# Patient Record
Sex: MALE | Race: WHITE | Employment: OTHER | URBAN - METROPOLITAN AREA
[De-identification: names, ages, dates, MRNs, and addresses within clinical notes are randomized per-mention and may not be internally consistent; named-entity substitution may affect disease eponyms.]

---

## 2017-03-02 ENCOUNTER — OFFICE VISIT (OUTPATIENT)
Dept: FAMILY MEDICINE CLINIC | Age: 82
End: 2017-03-02

## 2017-03-02 VITALS
WEIGHT: 181 LBS | SYSTOLIC BLOOD PRESSURE: 125 MMHG | DIASTOLIC BLOOD PRESSURE: 65 MMHG | HEART RATE: 60 BPM | BODY MASS INDEX: 24.52 KG/M2 | HEIGHT: 72 IN

## 2017-03-02 DIAGNOSIS — N13.8 BPH WITH OBSTRUCTION/LOWER URINARY TRACT SYMPTOMS: ICD-10-CM

## 2017-03-02 DIAGNOSIS — H35.30 MACULAR DEGENERATION: ICD-10-CM

## 2017-03-02 DIAGNOSIS — E78.00 HYPERCHOLESTEREMIA: ICD-10-CM

## 2017-03-02 DIAGNOSIS — N40.1 BPH WITH OBSTRUCTION/LOWER URINARY TRACT SYMPTOMS: ICD-10-CM

## 2017-03-02 DIAGNOSIS — R01.1 CARDIAC MURMUR, PREVIOUSLY UNDIAGNOSED: ICD-10-CM

## 2017-03-02 DIAGNOSIS — J43.9 PULMONARY EMPHYSEMA, UNSPECIFIED EMPHYSEMA TYPE (HCC): Primary | ICD-10-CM

## 2017-03-02 LAB
A/G RATIO: 1.6 (ref 1.1–2.2)
ALBUMIN SERPL-MCNC: 4.2 G/DL (ref 3.4–5)
ALP BLD-CCNC: 78 U/L (ref 40–129)
ALT SERPL-CCNC: 13 U/L (ref 10–40)
ANION GAP SERPL CALCULATED.3IONS-SCNC: 14 MMOL/L (ref 3–16)
AST SERPL-CCNC: 22 U/L (ref 15–37)
BASOPHILS ABSOLUTE: 0.1 K/UL (ref 0–0.2)
BASOPHILS RELATIVE PERCENT: 0.7 %
BILIRUB SERPL-MCNC: 0.6 MG/DL (ref 0–1)
BUN BLDV-MCNC: 23 MG/DL (ref 7–20)
CALCIUM SERPL-MCNC: 9.6 MG/DL (ref 8.3–10.6)
CHLORIDE BLD-SCNC: 104 MMOL/L (ref 99–110)
CHOLESTEROL, TOTAL: 205 MG/DL (ref 0–199)
CO2: 26 MMOL/L (ref 21–32)
CREAT SERPL-MCNC: 1 MG/DL (ref 0.8–1.3)
EOSINOPHILS ABSOLUTE: 0.1 K/UL (ref 0–0.6)
EOSINOPHILS RELATIVE PERCENT: 1.7 %
GFR AFRICAN AMERICAN: >60
GFR NON-AFRICAN AMERICAN: >60
GLOBULIN: 2.7 G/DL
GLUCOSE BLD-MCNC: 97 MG/DL (ref 70–99)
HCT VFR BLD CALC: 44.1 % (ref 40.5–52.5)
HDLC SERPL-MCNC: 47 MG/DL (ref 40–60)
HEMOGLOBIN: 14.3 G/DL (ref 13.5–17.5)
LDL CHOLESTEROL CALCULATED: 131 MG/DL
LYMPHOCYTES ABSOLUTE: 1.1 K/UL (ref 1–5.1)
LYMPHOCYTES RELATIVE PERCENT: 13.2 %
MCH RBC QN AUTO: 31.8 PG (ref 26–34)
MCHC RBC AUTO-ENTMCNC: 32.4 G/DL (ref 31–36)
MCV RBC AUTO: 98.4 FL (ref 80–100)
MONOCYTES ABSOLUTE: 0.9 K/UL (ref 0–1.3)
MONOCYTES RELATIVE PERCENT: 10.7 %
NEUTROPHILS ABSOLUTE: 5.9 K/UL (ref 1.7–7.7)
NEUTROPHILS RELATIVE PERCENT: 73.7 %
PDW BLD-RTO: 13.8 % (ref 12.4–15.4)
PLATELET # BLD: 186 K/UL (ref 135–450)
PMV BLD AUTO: 10.1 FL (ref 5–10.5)
POTASSIUM SERPL-SCNC: 4.8 MMOL/L (ref 3.5–5.1)
RBC # BLD: 4.49 M/UL (ref 4.2–5.9)
SODIUM BLD-SCNC: 144 MMOL/L (ref 136–145)
TOTAL PROTEIN: 6.9 G/DL (ref 6.4–8.2)
TRIGL SERPL-MCNC: 133 MG/DL (ref 0–150)
VLDLC SERPL CALC-MCNC: 27 MG/DL
WBC # BLD: 8 K/UL (ref 4–11)

## 2017-03-02 PROCEDURE — 99214 OFFICE O/P EST MOD 30 MIN: CPT | Performed by: FAMILY MEDICINE

## 2017-03-02 PROCEDURE — G8427 DOCREV CUR MEDS BY ELIG CLIN: HCPCS | Performed by: FAMILY MEDICINE

## 2017-03-02 PROCEDURE — 36415 COLL VENOUS BLD VENIPUNCTURE: CPT | Performed by: FAMILY MEDICINE

## 2017-03-02 PROCEDURE — 1036F TOBACCO NON-USER: CPT | Performed by: FAMILY MEDICINE

## 2017-03-02 PROCEDURE — G8926 SPIRO NO PERF OR DOC: HCPCS | Performed by: FAMILY MEDICINE

## 2017-03-02 PROCEDURE — 4040F PNEUMOC VAC/ADMIN/RCVD: CPT | Performed by: FAMILY MEDICINE

## 2017-03-02 PROCEDURE — G8420 CALC BMI NORM PARAMETERS: HCPCS | Performed by: FAMILY MEDICINE

## 2017-03-02 PROCEDURE — G8484 FLU IMMUNIZE NO ADMIN: HCPCS | Performed by: FAMILY MEDICINE

## 2017-03-02 PROCEDURE — 3023F SPIROM DOC REV: CPT | Performed by: FAMILY MEDICINE

## 2017-03-02 PROCEDURE — 1123F ACP DISCUSS/DSCN MKR DOCD: CPT | Performed by: FAMILY MEDICINE

## 2017-03-14 ENCOUNTER — TELEPHONE (OUTPATIENT)
Dept: FAMILY MEDICINE CLINIC | Age: 82
End: 2017-03-14

## 2017-03-14 ENCOUNTER — HOSPITAL ENCOUNTER (OUTPATIENT)
Dept: NON INVASIVE DIAGNOSTICS | Age: 82
Discharge: HOME | End: 2017-03-14
Attending: FAMILY MEDICINE | Admitting: FAMILY MEDICINE

## 2017-03-14 DIAGNOSIS — R01.1 CARDIAC MURMUR: ICD-10-CM

## 2017-03-14 LAB
LV EF: 58 %
LVEF MODALITY: NORMAL

## 2017-05-15 RX ORDER — FINASTERIDE 5 MG/1
5 TABLET, FILM COATED ORAL DAILY
Qty: 90 TABLET | Refills: 3 | Status: SHIPPED | OUTPATIENT
Start: 2017-05-15 | End: 2017-06-12 | Stop reason: SDUPTHER

## 2017-06-12 RX ORDER — FINASTERIDE 5 MG/1
5 TABLET, FILM COATED ORAL DAILY
Qty: 90 TABLET | Refills: 3 | Status: SHIPPED | OUTPATIENT
Start: 2017-06-12 | End: 2019-01-04 | Stop reason: CLARIF

## 2017-09-01 ENCOUNTER — OFFICE VISIT (OUTPATIENT)
Dept: FAMILY MEDICINE CLINIC | Age: 82
End: 2017-09-01

## 2017-09-01 VITALS
SYSTOLIC BLOOD PRESSURE: 125 MMHG | HEART RATE: 68 BPM | WEIGHT: 176 LBS | OXYGEN SATURATION: 95 % | DIASTOLIC BLOOD PRESSURE: 60 MMHG | RESPIRATION RATE: 12 BRPM | BODY MASS INDEX: 23.84 KG/M2 | HEIGHT: 72 IN

## 2017-09-01 DIAGNOSIS — J43.9 PULMONARY EMPHYSEMA, UNSPECIFIED EMPHYSEMA TYPE (HCC): Primary | ICD-10-CM

## 2017-09-01 DIAGNOSIS — Z23 NEED FOR STREPTOCOCCUS PNEUMONIAE AND INFLUENZA VACCINATION: ICD-10-CM

## 2017-09-01 DIAGNOSIS — N13.8 BPH WITH OBSTRUCTION/LOWER URINARY TRACT SYMPTOMS: ICD-10-CM

## 2017-09-01 DIAGNOSIS — N40.1 BPH WITH OBSTRUCTION/LOWER URINARY TRACT SYMPTOMS: ICD-10-CM

## 2017-09-01 DIAGNOSIS — E78.00 HYPERCHOLESTEREMIA: ICD-10-CM

## 2017-09-01 PROCEDURE — 1036F TOBACCO NON-USER: CPT | Performed by: FAMILY MEDICINE

## 2017-09-01 PROCEDURE — 3023F SPIROM DOC REV: CPT | Performed by: FAMILY MEDICINE

## 2017-09-01 PROCEDURE — 90662 IIV NO PRSV INCREASED AG IM: CPT | Performed by: FAMILY MEDICINE

## 2017-09-01 PROCEDURE — 1123F ACP DISCUSS/DSCN MKR DOCD: CPT | Performed by: FAMILY MEDICINE

## 2017-09-01 PROCEDURE — 4040F PNEUMOC VAC/ADMIN/RCVD: CPT | Performed by: FAMILY MEDICINE

## 2017-09-01 PROCEDURE — G8420 CALC BMI NORM PARAMETERS: HCPCS | Performed by: FAMILY MEDICINE

## 2017-09-01 PROCEDURE — G0009 ADMIN PNEUMOCOCCAL VACCINE: HCPCS | Performed by: FAMILY MEDICINE

## 2017-09-01 PROCEDURE — 90732 PPSV23 VACC 2 YRS+ SUBQ/IM: CPT | Performed by: FAMILY MEDICINE

## 2017-09-01 PROCEDURE — G0008 ADMIN INFLUENZA VIRUS VAC: HCPCS | Performed by: FAMILY MEDICINE

## 2017-09-01 PROCEDURE — 99214 OFFICE O/P EST MOD 30 MIN: CPT | Performed by: FAMILY MEDICINE

## 2017-09-01 PROCEDURE — G8926 SPIRO NO PERF OR DOC: HCPCS | Performed by: FAMILY MEDICINE

## 2017-09-01 PROCEDURE — G8427 DOCREV CUR MEDS BY ELIG CLIN: HCPCS | Performed by: FAMILY MEDICINE

## 2017-09-01 RX ORDER — BUDESONIDE AND FORMOTEROL FUMARATE DIHYDRATE 160; 4.5 UG/1; UG/1
2 AEROSOL RESPIRATORY (INHALATION) 2 TIMES DAILY
Qty: 1 INHALER | Refills: 5 | Status: SHIPPED | OUTPATIENT
Start: 2017-09-01 | End: 2018-03-02

## 2018-02-20 ENCOUNTER — TELEPHONE (OUTPATIENT)
Dept: FAMILY MEDICINE CLINIC | Age: 83
End: 2018-02-20

## 2018-03-02 ENCOUNTER — OFFICE VISIT (OUTPATIENT)
Dept: FAMILY MEDICINE CLINIC | Age: 83
End: 2018-03-02

## 2018-03-02 VITALS
BODY MASS INDEX: 23.84 KG/M2 | HEIGHT: 72 IN | HEART RATE: 59 BPM | DIASTOLIC BLOOD PRESSURE: 58 MMHG | WEIGHT: 176 LBS | SYSTOLIC BLOOD PRESSURE: 166 MMHG

## 2018-03-02 DIAGNOSIS — E55.9 VITAMIN D DEFICIENCY: ICD-10-CM

## 2018-03-02 DIAGNOSIS — I10 HYPERTENSION, ISOLATED SYSTOLIC: ICD-10-CM

## 2018-03-02 DIAGNOSIS — N13.8 BPH WITH OBSTRUCTION/LOWER URINARY TRACT SYMPTOMS: Primary | ICD-10-CM

## 2018-03-02 DIAGNOSIS — N40.1 BPH WITH OBSTRUCTION/LOWER URINARY TRACT SYMPTOMS: Primary | ICD-10-CM

## 2018-03-02 DIAGNOSIS — J43.9 PULMONARY EMPHYSEMA, UNSPECIFIED EMPHYSEMA TYPE (HCC): ICD-10-CM

## 2018-03-02 DIAGNOSIS — R26.81 UNSTEADY: ICD-10-CM

## 2018-03-02 PROCEDURE — G8420 CALC BMI NORM PARAMETERS: HCPCS | Performed by: FAMILY MEDICINE

## 2018-03-02 PROCEDURE — G8427 DOCREV CUR MEDS BY ELIG CLIN: HCPCS | Performed by: FAMILY MEDICINE

## 2018-03-02 PROCEDURE — 99214 OFFICE O/P EST MOD 30 MIN: CPT | Performed by: FAMILY MEDICINE

## 2018-03-02 PROCEDURE — 3023F SPIROM DOC REV: CPT | Performed by: FAMILY MEDICINE

## 2018-03-02 PROCEDURE — G8926 SPIRO NO PERF OR DOC: HCPCS | Performed by: FAMILY MEDICINE

## 2018-03-02 PROCEDURE — 1036F TOBACCO NON-USER: CPT | Performed by: FAMILY MEDICINE

## 2018-03-02 PROCEDURE — 1123F ACP DISCUSS/DSCN MKR DOCD: CPT | Performed by: FAMILY MEDICINE

## 2018-03-02 PROCEDURE — 4040F PNEUMOC VAC/ADMIN/RCVD: CPT | Performed by: FAMILY MEDICINE

## 2018-03-02 PROCEDURE — G8484 FLU IMMUNIZE NO ADMIN: HCPCS | Performed by: FAMILY MEDICINE

## 2018-03-02 ASSESSMENT — ENCOUNTER SYMPTOMS
NAUSEA: 0
DIARRHEA: 0
ABDOMINAL PAIN: 0
SHORTNESS OF BREATH: 1
COUGH: 1
VOMITING: 0
WHEEZING: 1
CONSTIPATION: 1

## 2018-03-02 ASSESSMENT — PATIENT HEALTH QUESTIONNAIRE - PHQ9
SUM OF ALL RESPONSES TO PHQ9 QUESTIONS 1 & 2: 0
1. LITTLE INTEREST OR PLEASURE IN DOING THINGS: 0
SUM OF ALL RESPONSES TO PHQ QUESTIONS 1-9: 0
2. FEELING DOWN, DEPRESSED OR HOPELESS: 0

## 2018-03-02 NOTE — PROGRESS NOTES
Chief Complaint   Patient presents with    Hypertension    Hyperlipidemia         HPI:  Annalise Chapman is a 80 y.o. (: 1927) here today   for review of multiple medical problems. Pt states that he has a consistent cough, wheezing and SOB that he associates with his pulmonary emphysema. Pt states that he had 2 falls that didn't result in injury. Pt states that he continues to have issues with going to the bathroom such as; weak stream, difficulty starting and going multiple times a night. He notes he continues with nocturia 2 times a night, weakness of stream and urinary hesitancy. Has been compliant with his Proscar and tolerating that well. He admits he had a fall recently 3 tells me it feels like his hip just gives way. Tells me he has intermittent pain in that hip but has been able to walk comfortably since that time. He has a history of vitamin D deficiency has been taking supplementation    Review of Systems   Constitutional: Negative. Negative for chills and fever. Respiratory: Positive for cough, shortness of breath and wheezing. Cardiovascular: Negative. Negative for chest pain and palpitations. Gastrointestinal: Positive for constipation. Negative for abdominal pain, diarrhea, nausea and vomiting. Endocrine: Negative. Negative for polyuria. Genitourinary: Negative. Negative for dysuria.        Past Medical History:   Diagnosis Date    Arthritis     BPH with obstruction/lower urinary tract symptoms     Dr. Shirlene Stephens Diverticular disease     Diverticulosis of sigmoid colon with h/o bleeding    Ectropion of right eye     Emphysema of lung (HCC)     PLASCENCIA    Hypercholesteremia     Macular degeneration     Dr. Nathan Gatica Moderate mitral regurgitation 2017    with mild Aortic Regurge and diastolic dysfunction    PONV (postoperative nausea and vomiting)     Squamous cell skin cancer     Nose and face    Vitamin D deficiency     Wears glasses      Family History

## 2018-03-13 ENCOUNTER — CARE COORDINATION (OUTPATIENT)
Dept: CARE COORDINATION | Age: 83
End: 2018-03-13

## 2018-06-22 ENCOUNTER — TELEPHONE (OUTPATIENT)
Dept: FAMILY MEDICINE CLINIC | Age: 83
End: 2018-06-22

## 2018-06-22 DIAGNOSIS — J43.9 PULMONARY EMPHYSEMA, UNSPECIFIED EMPHYSEMA TYPE (HCC): Primary | ICD-10-CM

## 2018-06-26 ENCOUNTER — TELEPHONE (OUTPATIENT)
Dept: FAMILY MEDICINE CLINIC | Age: 83
End: 2018-06-26

## 2018-07-06 ENCOUNTER — TELEPHONE (OUTPATIENT)
Dept: FAMILY MEDICINE CLINIC | Age: 83
End: 2018-07-06

## 2018-07-06 NOTE — TELEPHONE ENCOUNTER
Marylin Wiley with [facility] called to state that she is going to do a PT and OT evaluation on patient. If any questions Dr. Duglas Arreola may reach her at [phone].

## 2018-07-16 ENCOUNTER — TELEPHONE (OUTPATIENT)
Dept: FAMILY MEDICINE CLINIC | Age: 83
End: 2018-07-16

## 2018-07-17 ENCOUNTER — TELEPHONE (OUTPATIENT)
Dept: FAMILY MEDICINE CLINIC | Age: 83
End: 2018-07-17

## 2018-07-17 DIAGNOSIS — J43.9 PULMONARY EMPHYSEMA, UNSPECIFIED EMPHYSEMA TYPE (HCC): Primary | ICD-10-CM

## 2018-07-17 NOTE — TELEPHONE ENCOUNTER
The PT's daughter would like a call back regarding the PT getting on an inhaler.         She would like a call back asap--      Best call back for [facility]--[phone]

## 2018-07-17 NOTE — TELEPHONE ENCOUNTER
Is patient requesting combivent inhaler in chart? If so, ok to phone in today    Please document call and then close encounter.   thanks

## 2018-07-17 NOTE — TELEPHONE ENCOUNTER
Judith Ty states that this message doesn't have to be taken care of now. she is okay to wait for  for next week. Pt is on Combivent inhaler now. Wants to determined what is is cheaper  To be on an inhaler or nebulizer treatment.

## 2018-07-19 RX ORDER — ALBUTEROL SULFATE 90 UG/1
2 AEROSOL, METERED RESPIRATORY (INHALATION) EVERY 6 HOURS PRN
Qty: 1 INHALER | Refills: 3 | Status: SHIPPED | OUTPATIENT
Start: 2018-07-19 | End: 2019-01-04

## 2018-07-20 RX ORDER — IPRATROPIUM BROMIDE AND ALBUTEROL SULFATE 2.5; .5 MG/3ML; MG/3ML
1 SOLUTION RESPIRATORY (INHALATION) EVERY 6 HOURS PRN
Qty: 120 VIAL | Refills: 2 | Status: SHIPPED | OUTPATIENT
Start: 2018-07-20 | End: 2019-01-04

## 2018-07-23 ENCOUNTER — TELEPHONE (OUTPATIENT)
Dept: FAMILY MEDICINE CLINIC | Age: 83
End: 2018-07-23

## 2018-08-14 RX ORDER — FINASTERIDE 5 MG/1
TABLET, FILM COATED ORAL
Qty: 90 TABLET | Refills: 2 | Status: SHIPPED | OUTPATIENT
Start: 2018-08-14 | End: 2019-05-08 | Stop reason: SDUPTHER

## 2018-09-05 ENCOUNTER — TELEPHONE (OUTPATIENT)
Dept: FAMILY MEDICINE CLINIC | Age: 83
End: 2018-09-05

## 2018-09-24 ENCOUNTER — NURSE ONLY (OUTPATIENT)
Dept: FAMILY MEDICINE CLINIC | Age: 83
End: 2018-09-24
Payer: MEDICARE

## 2018-09-24 DIAGNOSIS — Z23 NEED FOR VACCINATION: Primary | ICD-10-CM

## 2018-09-24 PROCEDURE — 90682 RIV4 VACC RECOMBINANT DNA IM: CPT | Performed by: FAMILY MEDICINE

## 2018-09-24 PROCEDURE — G0008 ADMIN INFLUENZA VIRUS VAC: HCPCS | Performed by: FAMILY MEDICINE

## 2018-09-24 NOTE — PROGRESS NOTES
Vaccine Information Sheet, \"Influenza - Inactivated\"  given to Connie Bianchi, or parent/legal guardian of  Connie Bianchi and verbalized understanding. Patient responses:    Have you ever had a reaction to a flu vaccine? No  Are you able to eat eggs without adverse effects? Yes  Do you have any current illness? No  Have you ever had Guillian Tyro Syndrome? No    Flu vaccine given per order. Please see immunization tab.

## 2018-11-02 ENCOUNTER — TELEPHONE (OUTPATIENT)
Dept: FAMILY MEDICINE CLINIC | Age: 83
End: 2018-11-02

## 2018-11-02 NOTE — TELEPHONE ENCOUNTER
Attempted to contact patient on 11/2/2018. Result: left message on the patient's voicemail asking patient to return my call. Pre-Visit planning not completed.

## 2018-11-05 NOTE — TELEPHONE ENCOUNTER
Per HIPAA, talked to daughter in law, Donovan Lerma. Patient requested appointment be rescheduled to Jan. 4, 2019 so he can come in at same time as his wife.

## 2019-01-04 ENCOUNTER — OFFICE VISIT (OUTPATIENT)
Dept: PRIMARY CARE CLINIC | Age: 84
End: 2019-01-04
Payer: MEDICARE

## 2019-01-04 VITALS
BODY MASS INDEX: 22.89 KG/M2 | DIASTOLIC BLOOD PRESSURE: 66 MMHG | SYSTOLIC BLOOD PRESSURE: 124 MMHG | OXYGEN SATURATION: 94 % | HEART RATE: 66 BPM | HEIGHT: 72 IN | WEIGHT: 169 LBS
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
## 2019-01-04 DIAGNOSIS — I10 HYPERTENSION, ISOLATED SYSTOLIC: ICD-10-CM

## 2019-01-04 DIAGNOSIS — N40.1 BPH WITH OBSTRUCTION/LOWER URINARY TRACT SYMPTOMS: Primary | ICD-10-CM

## 2019-01-04 DIAGNOSIS — J43.9 PULMONARY EMPHYSEMA, UNSPECIFIED EMPHYSEMA TYPE (HCC): ICD-10-CM

## 2019-01-04 DIAGNOSIS — N13.8 BPH WITH OBSTRUCTION/LOWER URINARY TRACT SYMPTOMS: Primary | ICD-10-CM

## 2019-01-04 LAB
A/G RATIO: 1.5 (ref 1.1–2.2)
ALBUMIN SERPL-MCNC: 4.4 G/DL (ref 3.4–5)
ALP BLD-CCNC: 85 U/L (ref 40–129)
ALT SERPL-CCNC: 11 U/L (ref 10–40)
ANION GAP SERPL CALCULATED.3IONS-SCNC: 14 MMOL/L (ref 3–16)
AST SERPL-CCNC: 21 U/L (ref 15–37)
BILIRUB SERPL-MCNC: 0.4 MG/DL (ref 0–1)
BUN BLDV-MCNC: 25 MG/DL (ref 7–20)
CALCIUM SERPL-MCNC: 9.7 MG/DL (ref 8.3–10.6)
CHLORIDE BLD-SCNC: 103 MMOL/L (ref 99–110)
CHOLESTEROL, TOTAL: 219 MG/DL (ref 0–199)
CO2: 27 MMOL/L (ref 21–32)
CREAT SERPL-MCNC: 1.1 MG/DL (ref 0.8–1.3)
GFR AFRICAN AMERICAN: >60
GFR NON-AFRICAN AMERICAN: >60
GLOBULIN: 2.9 G/DL
GLUCOSE BLD-MCNC: 68 MG/DL (ref 70–99)
HDLC SERPL-MCNC: 45 MG/DL (ref 40–60)
LDL CHOLESTEROL CALCULATED: 146 MG/DL
POTASSIUM SERPL-SCNC: 5.1 MMOL/L (ref 3.5–5.1)
SODIUM BLD-SCNC: 144 MMOL/L (ref 136–145)
TOTAL PROTEIN: 7.3 G/DL (ref 6.4–8.2)
TRIGL SERPL-MCNC: 138 MG/DL (ref 0–150)
VLDLC SERPL CALC-MCNC: 28 MG/DL

## 2019-01-04 PROCEDURE — 3023F SPIROM DOC REV: CPT | Performed by: FAMILY MEDICINE

## 2019-01-04 PROCEDURE — G8420 CALC BMI NORM PARAMETERS: HCPCS | Performed by: FAMILY MEDICINE

## 2019-01-04 PROCEDURE — G8427 DOCREV CUR MEDS BY ELIG CLIN: HCPCS | Performed by: FAMILY MEDICINE

## 2019-01-04 PROCEDURE — 99214 OFFICE O/P EST MOD 30 MIN: CPT | Performed by: FAMILY MEDICINE

## 2019-01-04 PROCEDURE — 1101F PT FALLS ASSESS-DOCD LE1/YR: CPT | Performed by: FAMILY MEDICINE

## 2019-01-04 PROCEDURE — 1123F ACP DISCUSS/DSCN MKR DOCD: CPT | Performed by: FAMILY MEDICINE

## 2019-01-04 PROCEDURE — 1036F TOBACCO NON-USER: CPT | Performed by: FAMILY MEDICINE

## 2019-01-04 PROCEDURE — G8482 FLU IMMUNIZE ORDER/ADMIN: HCPCS | Performed by: FAMILY MEDICINE

## 2019-01-04 PROCEDURE — G8926 SPIRO NO PERF OR DOC: HCPCS | Performed by: FAMILY MEDICINE

## 2019-01-04 PROCEDURE — 4040F PNEUMOC VAC/ADMIN/RCVD: CPT | Performed by: FAMILY MEDICINE

## 2019-01-04 ASSESSMENT — ENCOUNTER SYMPTOMS
ABDOMINAL PAIN: 0
SHORTNESS OF BREATH: 1
NAUSEA: 0
CONSTIPATION: 0
VOMITING: 0
COUGH: 1
DIARRHEA: 0

## 2019-01-07 ENCOUNTER — TELEPHONE (OUTPATIENT)
Dept: PRIMARY CARE CLINIC | Age: 84
End: 2019-01-07

## 2019-01-07 DIAGNOSIS — J43.9 PULMONARY EMPHYSEMA, UNSPECIFIED EMPHYSEMA TYPE (HCC): Primary | ICD-10-CM

## 2019-01-07 RX ORDER — ALBUTEROL SULFATE 90 UG/1
2 AEROSOL, METERED RESPIRATORY (INHALATION) EVERY 6 HOURS PRN
Qty: 1 INHALER | Refills: 3 | Status: SHIPPED | OUTPATIENT
Start: 2019-01-07 | End: 2020-07-29

## 2019-03-26 ENCOUNTER — TELEPHONE (OUTPATIENT)
Dept: PRIMARY CARE CLINIC | Age: 84
End: 2019-03-26

## 2019-03-26 DIAGNOSIS — G89.29 CHRONIC BACK PAIN, UNSPECIFIED BACK LOCATION, UNSPECIFIED BACK PAIN LATERALITY: ICD-10-CM

## 2019-03-26 DIAGNOSIS — J43.9 PULMONARY EMPHYSEMA, UNSPECIFIED EMPHYSEMA TYPE (HCC): Primary | ICD-10-CM

## 2019-03-26 DIAGNOSIS — M54.9 CHRONIC BACK PAIN, UNSPECIFIED BACK LOCATION, UNSPECIFIED BACK PAIN LATERALITY: ICD-10-CM

## 2019-03-29 ENCOUNTER — HOSPITAL ENCOUNTER (OUTPATIENT)
Age: 84
Discharge: HOME | End: 2019-03-29
Payer: MEDICARE

## 2019-03-29 ENCOUNTER — HOSPITAL ENCOUNTER (OUTPATIENT)
Dept: GENERAL RADIOLOGY | Age: 84
Discharge: HOME | End: 2019-03-29
Payer: MEDICARE

## 2019-03-29 DIAGNOSIS — G89.29 CHRONIC BACK PAIN, UNSPECIFIED BACK LOCATION, UNSPECIFIED BACK PAIN LATERALITY: ICD-10-CM

## 2019-03-29 DIAGNOSIS — M54.9 CHRONIC BACK PAIN, UNSPECIFIED BACK LOCATION, UNSPECIFIED BACK PAIN LATERALITY: ICD-10-CM

## 2019-03-29 PROCEDURE — 72070 X-RAY EXAM THORAC SPINE 2VWS: CPT

## 2019-03-29 PROCEDURE — 72100 X-RAY EXAM L-S SPINE 2/3 VWS: CPT

## 2019-04-09 ENCOUNTER — OFFICE VISIT (OUTPATIENT)
Dept: PRIMARY CARE CLINIC | Age: 84
End: 2019-04-09
Payer: MEDICARE

## 2019-04-09 VITALS
WEIGHT: 164 LBS | HEART RATE: 81 BPM | BODY MASS INDEX: 22.24 KG/M2 | SYSTOLIC BLOOD PRESSURE: 134 MMHG | DIASTOLIC BLOOD PRESSURE: 58 MMHG

## 2019-04-09 DIAGNOSIS — J43.9 PULMONARY EMPHYSEMA, UNSPECIFIED EMPHYSEMA TYPE (HCC): Primary | ICD-10-CM

## 2019-04-09 DIAGNOSIS — Z91.81 AT HIGH RISK FOR FALLS: ICD-10-CM

## 2019-04-09 DIAGNOSIS — N40.1 BPH WITH OBSTRUCTION/LOWER URINARY TRACT SYMPTOMS: ICD-10-CM

## 2019-04-09 DIAGNOSIS — R10.12 LUQ ABDOMINAL PAIN: ICD-10-CM

## 2019-04-09 DIAGNOSIS — N13.8 BPH WITH OBSTRUCTION/LOWER URINARY TRACT SYMPTOMS: ICD-10-CM

## 2019-04-09 DIAGNOSIS — I10 HYPERTENSION, ISOLATED SYSTOLIC: ICD-10-CM

## 2019-04-09 LAB
A/G RATIO: 1.1 (ref 1.1–2.2)
ALBUMIN SERPL-MCNC: 3.7 G/DL (ref 3.4–5)
ALP BLD-CCNC: 95 U/L (ref 40–129)
ALT SERPL-CCNC: 8 U/L (ref 10–40)
ANION GAP SERPL CALCULATED.3IONS-SCNC: 12 MMOL/L (ref 3–16)
AST SERPL-CCNC: 18 U/L (ref 15–37)
BASOPHILS ABSOLUTE: 0.1 K/UL (ref 0–0.2)
BASOPHILS RELATIVE PERCENT: 0.6 %
BILIRUB SERPL-MCNC: 0.3 MG/DL (ref 0–1)
BUN BLDV-MCNC: 23 MG/DL (ref 7–20)
CALCIUM SERPL-MCNC: 9.2 MG/DL (ref 8.3–10.6)
CHLORIDE BLD-SCNC: 101 MMOL/L (ref 99–110)
CO2: 26 MMOL/L (ref 21–32)
CREAT SERPL-MCNC: 1 MG/DL (ref 0.8–1.3)
EOSINOPHILS ABSOLUTE: 0.2 K/UL (ref 0–0.6)
EOSINOPHILS RELATIVE PERCENT: 1.5 %
GFR AFRICAN AMERICAN: >60
GFR NON-AFRICAN AMERICAN: >60
GLOBULIN: 3.3 G/DL
GLUCOSE BLD-MCNC: 83 MG/DL (ref 70–99)
HCT VFR BLD CALC: 39.3 % (ref 40.5–52.5)
HEMOGLOBIN: 12.9 G/DL (ref 13.5–17.5)
LYMPHOCYTES ABSOLUTE: 1 K/UL (ref 1–5.1)
LYMPHOCYTES RELATIVE PERCENT: 10.2 %
MCH RBC QN AUTO: 31.5 PG (ref 26–34)
MCHC RBC AUTO-ENTMCNC: 32.9 G/DL (ref 31–36)
MCV RBC AUTO: 95.7 FL (ref 80–100)
MONOCYTES ABSOLUTE: 1.1 K/UL (ref 0–1.3)
MONOCYTES RELATIVE PERCENT: 10.5 %
NEUTROPHILS ABSOLUTE: 7.9 K/UL (ref 1.7–7.7)
NEUTROPHILS RELATIVE PERCENT: 77.2 %
PDW BLD-RTO: 13.6 % (ref 12.4–15.4)
PLATELET # BLD: 307 K/UL (ref 135–450)
PMV BLD AUTO: 9.3 FL (ref 5–10.5)
POTASSIUM SERPL-SCNC: 4.4 MMOL/L (ref 3.5–5.1)
RBC # BLD: 4.11 M/UL (ref 4.2–5.9)
SODIUM BLD-SCNC: 139 MMOL/L (ref 136–145)
TOTAL PROTEIN: 7 G/DL (ref 6.4–8.2)
WBC # BLD: 10.2 K/UL (ref 4–11)

## 2019-04-09 PROCEDURE — 99215 OFFICE O/P EST HI 40 MIN: CPT | Performed by: FAMILY MEDICINE

## 2019-04-09 PROCEDURE — 1036F TOBACCO NON-USER: CPT | Performed by: FAMILY MEDICINE

## 2019-04-09 PROCEDURE — 4040F PNEUMOC VAC/ADMIN/RCVD: CPT | Performed by: FAMILY MEDICINE

## 2019-04-09 PROCEDURE — G8427 DOCREV CUR MEDS BY ELIG CLIN: HCPCS | Performed by: FAMILY MEDICINE

## 2019-04-09 PROCEDURE — 3023F SPIROM DOC REV: CPT | Performed by: FAMILY MEDICINE

## 2019-04-09 PROCEDURE — G8926 SPIRO NO PERF OR DOC: HCPCS | Performed by: FAMILY MEDICINE

## 2019-04-09 PROCEDURE — 1123F ACP DISCUSS/DSCN MKR DOCD: CPT | Performed by: FAMILY MEDICINE

## 2019-04-09 PROCEDURE — G8420 CALC BMI NORM PARAMETERS: HCPCS | Performed by: FAMILY MEDICINE

## 2019-04-09 ASSESSMENT — ENCOUNTER SYMPTOMS
SHORTNESS OF BREATH: 1
DIARRHEA: 0
ABDOMINAL PAIN: 1
COUGH: 0
EYE PAIN: 0
COLOR CHANGE: 0
NAUSEA: 0
VOMITING: 0
SORE THROAT: 0
CONSTIPATION: 1
RHINORRHEA: 0

## 2019-04-09 ASSESSMENT — PATIENT HEALTH QUESTIONNAIRE - PHQ9
2. FEELING DOWN, DEPRESSED OR HOPELESS: 1
SUM OF ALL RESPONSES TO PHQ QUESTIONS 1-9: 1
SUM OF ALL RESPONSES TO PHQ QUESTIONS 1-9: 1
SUM OF ALL RESPONSES TO PHQ9 QUESTIONS 1 & 2: 1
1. LITTLE INTEREST OR PLEASURE IN DOING THINGS: 0

## 2019-04-09 NOTE — PROGRESS NOTES
On the basis of positive falls risk screening, assessment and plan is as follows: home safety tips provided, patient will follow up in 3 month(s) for further evaluation, He refuses further evaluation and intervention for this. He does appreciate the home evaluation he got. Dior Najera

## 2019-04-09 NOTE — PROGRESS NOTES
Non-medical: Not on file   Tobacco Use    Smoking status: Former Smoker    Smokeless tobacco: Never Used    Tobacco comment: quit 50 years ago   Substance and Sexual Activity    Alcohol use: Yes     Alcohol/week: 0.0 oz     Comment: infrequently    Drug use: No    Sexual activity: Never   Lifestyle    Physical activity:     Days per week: Not on file     Minutes per session: Not on file    Stress: Not on file   Relationships    Social connections:     Talks on phone: Not on file     Gets together: Not on file     Attends Sikhism service: Not on file     Active member of club or organization: Not on file     Attends meetings of clubs or organizations: Not on file     Relationship status: Not on file    Intimate partner violence:     Fear of current or ex partner: Not on file     Emotionally abused: Not on file     Physically abused: Not on file     Forced sexual activity: Not on file   Other Topics Concern    Not on file   Social History Narrative    Not on file       New Prescriptions    No medications on file         Meds Prior to visit:  Prior to Visit Medications    Medication Sig Taking?  Authorizing Provider   Bisacodyl (LAXATIVE PO) Take by mouth Yes Historical Provider, MD   Multiple Vitamins-Minerals (PRESERVISION AREDS 2+MULTI VIT PO) Take by mouth Yes Historical Provider, MD   albuterol sulfate HFA (PROVENTIL HFA) 108 (90 Base) MCG/ACT inhaler Inhale 2 puffs into the lungs every 6 hours as needed for Wheezing Yes Brayan Espino MD   finasteride (PROSCAR) 5 MG tablet TAKE ONE TABLET BY MOUTH DAILY Yes Brayan Espino MD   docusate sodium (COLACE) 100 MG capsule Take 1 capsule by mouth 2 times daily Yes Brayan Espino MD   Cholecalciferol (VITAMIN D) 2000 UNITS CAPS capsule Take by mouth daily Yes Historical Provider, MD   albuterol-ipratropium (COMBIVENT RESPIMAT)  MCG/ACT AERS inhaler Inhale 1 puff into the lungs every 6 hours  Brayan Espino MD     Allergies   Allergen Reactions  Erythromycin Base Other (See Comments)     Unable to recall reaction    Fentanyl     Pcn [Penicillins] Other (See Comments)     Passed out    Versed [Midazolam] Nausea And Vomiting       OBJECTIVE:    BP (!) 134/58   Pulse 81   Wt 164 lb (74.4 kg)   BMI 22.24 kg/m²   BP Readings from Last 2 Encounters:   04/09/19 (!) 134/58   01/04/19 124/66     Wt Readings from Last 3 Encounters:   04/09/19 164 lb (74.4 kg)   01/04/19 169 lb (76.7 kg)   03/02/18 176 lb (79.8 kg)       Physical Exam   Constitutional: He appears well-developed and well-nourished. HENT:   Head: Normocephalic and atraumatic. Nose: Nose normal.   Mouth/Throat: No oropharyngeal exudate. TMs negative bilaterally, canals patent, nasal mucosa pink and patent,  Oropharynx pink and patent   Eyes: Pupils are equal, round, and reactive to light. Right eye exhibits no discharge. Left eye exhibits no discharge. No scleral icterus. Neck: Normal range of motion. Neck supple. No thyromegaly present. Cardiovascular: Normal rate, regular rhythm, normal heart sounds and intact distal pulses. Exam reveals no gallop and no friction rub. No murmur heard. Pulses:       Dorsalis pedis pulses are 2+ on the right side, and 2+ on the left side. Posterior tibial pulses are 2+ on the right side, and 2+ on the left side. No Edema Lower Extremities   Pulmonary/Chest: Effort normal and breath sounds normal. He has no wheezes. He has no rales. Abdominal: Soft. Bowel sounds are normal. He exhibits no distension. There is no splenomegaly or hepatomegaly. There is no tenderness. There is no rebound and no guarding. Musculoskeletal: Normal range of motion. He exhibits no tenderness or deformity. Lymphadenopathy:     He has no cervical adenopathy. Neurological: He is alert. He has normal strength. No cranial nerve deficit or sensory deficit. Gait normal.   Skin: Skin is warm and dry. No rash noted. No erythema.    Psychiatric: He has a normal mood and affect. His speech is normal and behavior is normal.           LDL Calculated (mg/dL)   Date Value   01/04/2019 146 (H)       ASSESSMENT/PLAN:    1. Pulmonary emphysema, unspecified emphysema type (HCC)  Controlled: Appears stable. We will continue current management and monitor for adverse reaction and disease progression. Follow-up as noted below      2. Hypertension, isolated systolic  Controlled: Appears stable. We will continue current management and monitor for adverse reaction and disease progression. Follow-up as noted below      3. BPH with obstruction/lower urinary tract symptoms  Chroinic: continue proscar and folow. 4. At high risk for falls  Refuses further intervention, continue HEP    5. LUQ abdominal pain  With wt loss, would benefit from w/u with CT and labs. RTC 3months and prn. Scribe attestation:  Tono Brice MA, am scribing for and in the presence of Kody Stark MD. Electronically signed by Solomon Knapp MA on 4/9/2019 at 11:09 AM            Provider attestation: Mi Rice MD, personally performed the services scribed by the user listed above in my presence, and it is both accurate and complete. I agree with the ROS and Past Histories independently gathered by the clinical support staff and the remaining scribed note accurately describes my personal service to the patient.     [facility]    4/9/2019  11:39 AM

## 2019-04-09 NOTE — PATIENT INSTRUCTIONS
We are evaluating his abdominal pain and weight loss with a CBC and CT of his abdomen and pelvis (looking for possible underlying cancer and aortic aneurysm or other colon abdominal problems)

## 2019-04-11 ENCOUNTER — TELEPHONE (OUTPATIENT)
Dept: PRIMARY CARE CLINIC | Age: 84
End: 2019-04-11

## 2019-04-11 ENCOUNTER — HOSPITAL ENCOUNTER (OUTPATIENT)
Dept: CT IMAGING | Age: 84
Discharge: HOME | End: 2019-04-11
Payer: MEDICARE

## 2019-04-11 DIAGNOSIS — R10.12 LUQ ABDOMINAL PAIN: Primary | ICD-10-CM

## 2019-04-11 DIAGNOSIS — R10.12 LUQ ABDOMINAL PAIN: ICD-10-CM

## 2019-04-11 PROCEDURE — 74177 CT ABD & PELVIS W/CONTRAST: CPT

## 2019-04-11 PROCEDURE — 6360000004 HC RX CONTRAST MEDICATION: Performed by: FAMILY MEDICINE

## 2019-04-11 RX ADMIN — IOHEXOL 50 ML: 240 INJECTION, SOLUTION INTRATHECAL; INTRAVASCULAR; INTRAVENOUS; ORAL at 08:05

## 2019-04-11 RX ADMIN — IOVERSOL 100 ML: 678 INJECTION INTRA-ARTERIAL; INTRAVENOUS at 09:20

## 2019-05-08 RX ORDER — FINASTERIDE 5 MG/1
TABLET, FILM COATED ORAL
Qty: 90 TABLET | Refills: 0 | Status: SHIPPED | OUTPATIENT
Start: 2019-05-08 | End: 2019-08-21 | Stop reason: SDUPTHER

## 2019-06-26 ENCOUNTER — TELEPHONE (OUTPATIENT)
Dept: PRIMARY CARE CLINIC | Age: 84
End: 2019-06-26

## 2019-06-26 NOTE — TELEPHONE ENCOUNTER
Dr. Dionisio Reese 07/10:    Notes: spoke w/ daughter, Ge Sánchez. HM suggests a shingles vaccine. This patient has an upcoming appointment with you for Chronic Obstructive Pulmonary Disease (COPD( and Hyperlipidemia. In planning for that visit I have completed the following pre-visit planning:     Pre-Visit Planning Checklist:  Patient contacted: yes  Verified patient by name and date of birth: yes    Health Maintenance items reviewed:    No pre-visit planning health maintenance topics to review at this time    Labs and procedures pended: Non-Fasting none pending   Labs and procedures discussed with patient: no  Reminded patient to check with their insurance company about coverage for lab tests and lab location: no    Preliminary Medication Reconciliation: was performed. Reminded patient to arrive early: yes    Please complete the med-reconciliation and sign the appropriate labs as soon as possible.       Boyd Graves  Patient Services Specialist  [phone]

## 2019-08-13 ENCOUNTER — TELEPHONE (OUTPATIENT)
Dept: FAMILY MEDICINE CLINIC | Age: 84
End: 2019-08-13

## 2019-08-13 ENCOUNTER — OFFICE VISIT (OUTPATIENT)
Dept: FAMILY MEDICINE CLINIC | Age: 84
End: 2019-08-13
Payer: MEDICARE

## 2019-08-13 VITALS
HEART RATE: 80 BPM | HEIGHT: 69 IN | BODY MASS INDEX: 23.61 KG/M2 | DIASTOLIC BLOOD PRESSURE: 71 MMHG | OXYGEN SATURATION: 90 % | SYSTOLIC BLOOD PRESSURE: 136 MMHG | WEIGHT: 159.4 LBS

## 2019-08-13 DIAGNOSIS — R06.09 DYSPNEA ON EXERTION: Primary | ICD-10-CM

## 2019-08-13 DIAGNOSIS — N40.1 BPH WITH OBSTRUCTION/LOWER URINARY TRACT SYMPTOMS: ICD-10-CM

## 2019-08-13 DIAGNOSIS — J43.9 PULMONARY EMPHYSEMA, UNSPECIFIED EMPHYSEMA TYPE (HCC): ICD-10-CM

## 2019-08-13 DIAGNOSIS — Z66 DO NOT RESUSCITATE STATUS: ICD-10-CM

## 2019-08-13 DIAGNOSIS — N13.8 BPH WITH OBSTRUCTION/LOWER URINARY TRACT SYMPTOMS: ICD-10-CM

## 2019-08-13 DIAGNOSIS — R09.02 HYPOXIA: ICD-10-CM

## 2019-08-13 DIAGNOSIS — Z71.89 ADVANCED DIRECTIVES, COUNSELING/DISCUSSION: ICD-10-CM

## 2019-08-13 DIAGNOSIS — J90 PLEURAL EFFUSION: ICD-10-CM

## 2019-08-13 DIAGNOSIS — R63.4 WEIGHT LOSS: ICD-10-CM

## 2019-08-13 LAB
A/G RATIO: 1.3 (ref 1.1–2.2)
ALBUMIN SERPL-MCNC: 3.8 G/DL (ref 3.4–5)
ALP BLD-CCNC: 85 U/L (ref 40–129)
ALT SERPL-CCNC: 8 U/L (ref 10–40)
ANION GAP SERPL CALCULATED.3IONS-SCNC: 15 MMOL/L (ref 3–16)
AST SERPL-CCNC: 18 U/L (ref 15–37)
BASOPHILS ABSOLUTE: 0.1 K/UL (ref 0–0.2)
BASOPHILS RELATIVE PERCENT: 0.6 %
BILIRUB SERPL-MCNC: 0.3 MG/DL (ref 0–1)
BUN BLDV-MCNC: 26 MG/DL (ref 7–20)
CALCIUM SERPL-MCNC: 9.6 MG/DL (ref 8.3–10.6)
CHLORIDE BLD-SCNC: 97 MMOL/L (ref 99–110)
CO2: 25 MMOL/L (ref 21–32)
CREAT SERPL-MCNC: 1 MG/DL (ref 0.8–1.3)
EOSINOPHILS ABSOLUTE: 0.1 K/UL (ref 0–0.6)
EOSINOPHILS RELATIVE PERCENT: 1.3 %
GFR AFRICAN AMERICAN: >60
GFR NON-AFRICAN AMERICAN: >60
GLOBULIN: 3 G/DL
GLUCOSE BLD-MCNC: 100 MG/DL (ref 70–99)
HCT VFR BLD CALC: 40.3 % (ref 40.5–52.5)
HEMOGLOBIN: 13.3 G/DL (ref 13.5–17.5)
LYMPHOCYTES ABSOLUTE: 0.8 K/UL (ref 1–5.1)
LYMPHOCYTES RELATIVE PERCENT: 8.9 %
MCH RBC QN AUTO: 30.9 PG (ref 26–34)
MCHC RBC AUTO-ENTMCNC: 33 G/DL (ref 31–36)
MCV RBC AUTO: 93.9 FL (ref 80–100)
MONOCYTES ABSOLUTE: 1 K/UL (ref 0–1.3)
MONOCYTES RELATIVE PERCENT: 11.4 %
NEUTROPHILS ABSOLUTE: 7 K/UL (ref 1.7–7.7)
NEUTROPHILS RELATIVE PERCENT: 77.8 %
PDW BLD-RTO: 14.6 % (ref 12.4–15.4)
PLATELET # BLD: 267 K/UL (ref 135–450)
PMV BLD AUTO: 9.4 FL (ref 5–10.5)
POTASSIUM SERPL-SCNC: 4.5 MMOL/L (ref 3.5–5.1)
RBC # BLD: 4.29 M/UL (ref 4.2–5.9)
SODIUM BLD-SCNC: 137 MMOL/L (ref 136–145)
T4 FREE: 1.1 NG/DL (ref 0.9–1.8)
TOTAL PROTEIN: 6.8 G/DL (ref 6.4–8.2)
TSH REFLEX: 7.37 UIU/ML (ref 0.27–4.2)
WBC # BLD: 9 K/UL (ref 4–11)

## 2019-08-13 PROCEDURE — G8420 CALC BMI NORM PARAMETERS: HCPCS | Performed by: INTERNAL MEDICINE

## 2019-08-13 PROCEDURE — 4040F PNEUMOC VAC/ADMIN/RCVD: CPT | Performed by: INTERNAL MEDICINE

## 2019-08-13 PROCEDURE — G8427 DOCREV CUR MEDS BY ELIG CLIN: HCPCS | Performed by: INTERNAL MEDICINE

## 2019-08-13 PROCEDURE — G8926 SPIRO NO PERF OR DOC: HCPCS | Performed by: INTERNAL MEDICINE

## 2019-08-13 PROCEDURE — 99203 OFFICE O/P NEW LOW 30 MIN: CPT | Performed by: INTERNAL MEDICINE

## 2019-08-13 PROCEDURE — 1123F ACP DISCUSS/DSCN MKR DOCD: CPT | Performed by: INTERNAL MEDICINE

## 2019-08-13 PROCEDURE — 3023F SPIROM DOC REV: CPT | Performed by: INTERNAL MEDICINE

## 2019-08-13 PROCEDURE — 1036F TOBACCO NON-USER: CPT | Performed by: INTERNAL MEDICINE

## 2019-08-13 PROCEDURE — 36415 COLL VENOUS BLD VENIPUNCTURE: CPT | Performed by: INTERNAL MEDICINE

## 2019-08-13 RX ORDER — BUDESONIDE AND FORMOTEROL FUMARATE DIHYDRATE 160; 4.5 UG/1; UG/1
2 AEROSOL RESPIRATORY (INHALATION) 2 TIMES DAILY
Qty: 1 INHALER | Refills: 5 | Status: SHIPPED | OUTPATIENT
Start: 2019-08-13 | End: 2019-11-21 | Stop reason: ALTCHOICE

## 2019-08-13 NOTE — PROGRESS NOTES
worsened signficantly. We will do pulmonary function testing and will also get a CT given his recent weight loss. He also had an effusion noted on his CT abdomen that may be the underlying etiology and needs followup. He is advised to start using his rescue inhaler. We will also start him on a controller to see how he responds. He will see pulmonology as he very likely needs to get started on oxygen and is willing to do this to improve his quality of life  -     Full PFT Study With Bronchodilator; Future  -     [facility] Pulmonlogy  -     CT CHEST WO CONTRAST    BPH with obstruction/lower urinary tract symptoms  Stable. cont present management  Do not resuscitate status    Advanced directives, counseling/discussion  With a long discussion regarding his wishes as well as his wife's wishes. He would like to be DNR in the event of an emergency. He would like to pass peacefully if possible. He believes he has a living will and a medical power of  is encouraged to confirm     Weight loss  He had an incomplete w/u with GI - due to his breathing an EGD was not performed. However, he is no longer having abdominal complaints. suspect this is primarily related to his pulmonary status. Recommend CT imaging and addressing his dyspnea as above  -     CBC Auto Differential  -     TSH with Reflex  -     Comprehensive Metabolic Panel  -     CT CHEST WO CONTRAST    Other orders  -     umeclidinium-vilanterol (ANORO ELLIPTA) 62.5-25 MCG/INH AEPB inhaler; Inhale 1 puff into the lungs daily      Abelino Myers M.D. Internal Medicine and Pediatrics  [facility]    Please be advised that portions of this note were dictated with the use of Dragon which may result in linguistic errors. Please contact me should there be any questions.

## 2019-08-13 NOTE — TELEPHONE ENCOUNTER
[facility] pharmacy called stating that the prescription for Anoro is $355 and they would like to know if it can be changed to something less expensive.

## 2019-08-15 ENCOUNTER — TELEPHONE (OUTPATIENT)
Dept: INTERNAL MEDICINE CLINIC | Age: 84
End: 2019-08-15

## 2019-08-15 DIAGNOSIS — R79.89 ABNORMAL THYROID BLOOD TEST: Primary | ICD-10-CM

## 2019-08-15 RX ORDER — LEVOTHYROXINE SODIUM 0.03 MG/1
25 TABLET ORAL DAILY
Qty: 30 TABLET | Refills: 0 | Status: SHIPPED | OUTPATIENT
Start: 2019-08-15 | End: 2019-09-08 | Stop reason: SDUPTHER

## 2019-08-16 ENCOUNTER — TELEPHONE (OUTPATIENT)
Dept: FAMILY MEDICINE CLINIC | Age: 84
End: 2019-08-16

## 2019-08-16 NOTE — TELEPHONE ENCOUNTER
[facility] scheduling calling stating that the Dx codes on CT chest without contrast test are being denied by medicare. Wanting to know if these codes are able to be changed to code(s) that medicare will cover. Please advise. If this is able to be addressed today that would be great. Notified scheduling that Dr Calos Frederick is back in the office on Monday, that it may have to wait until then.  Please advise

## 2019-08-16 NOTE — TELEPHONE ENCOUNTER
MARIBELL--Dr Justo Amos, a representative from [address] [place] [address] will be calling you on Monday to try to get the CT approved.  Will probably be calling around 8:30ish

## 2019-08-16 NOTE — TELEPHONE ENCOUNTER
I have tried every code I can think of - i dont understand why its not covered for these codes.  if they have any suggestions it would be appreciated

## 2019-08-19 ENCOUNTER — HOSPITAL ENCOUNTER (OUTPATIENT)
Dept: CT IMAGING | Age: 84
Discharge: HOME | End: 2019-08-19
Payer: MEDICARE

## 2019-08-19 DIAGNOSIS — J90 PLEURAL EFFUSION: ICD-10-CM

## 2019-08-19 DIAGNOSIS — R06.09 DYSPNEA ON EXERTION: ICD-10-CM

## 2019-08-19 DIAGNOSIS — J43.9 PULMONARY EMPHYSEMA, UNSPECIFIED EMPHYSEMA TYPE (HCC): ICD-10-CM

## 2019-08-19 DIAGNOSIS — E87.1 HYPONATREMIA: Primary | ICD-10-CM

## 2019-08-19 DIAGNOSIS — R79.89 ABNORMAL TSH: ICD-10-CM

## 2019-08-19 DIAGNOSIS — R63.4 WEIGHT LOSS: ICD-10-CM

## 2019-08-19 DIAGNOSIS — E03.9 HYPOTHYROIDISM, UNSPECIFIED TYPE: Primary | ICD-10-CM

## 2019-08-19 DIAGNOSIS — E03.9 HYPOTHYROIDISM, UNSPECIFIED TYPE: ICD-10-CM

## 2019-08-19 PROCEDURE — 71250 CT THORAX DX C-: CPT

## 2019-08-23 ENCOUNTER — HOSPITAL ENCOUNTER (OUTPATIENT)
Dept: PULMONOLOGY | Age: 84
Discharge: HOME | End: 2019-08-23
Payer: MEDICARE

## 2019-08-23 DIAGNOSIS — J43.9 PULMONARY EMPHYSEMA, UNSPECIFIED EMPHYSEMA TYPE (HCC): ICD-10-CM

## 2019-08-23 LAB
DLCO %PRED: 47 %
DLCO PRED: NORMAL ML/MIN/MMHG
DLCO/VA %PRED: NORMAL %
DLCO/VA PRED: NORMAL ML/MIN/MMHG
DLCO/VA: NORMAL ML/MIN/MMHG
DLCO: NORMAL ML/MIN/MMHG
EXPIRATORY TIME-POST: NORMAL SEC
EXPIRATORY TIME: NORMAL SEC
FEF 25-75% %CHNG: NORMAL
FEF 25-75% %PRED-POST: NORMAL %
FEF 25-75% %PRED-PRE: NORMAL L/SEC
FEF 25-75% PRED: NORMAL L/SEC
FEF 25-75%-POST: NORMAL L/SEC
FEF 25-75%-PRE: NORMAL L/SEC
FEV1 %PRED-POST: 63 %
FEV1 %PRED-PRE: 53 %
FEV1 PRED: NORMAL L
FEV1-POST: NORMAL L
FEV1-PRE: NORMAL L
FEV1/FVC %PRED-POST: NORMAL %
FEV1/FVC %PRED-PRE: NORMAL %
FEV1/FVC PRED: NORMAL %
FEV1/FVC-POST: 79 %
FEV1/FVC-PRE: 69 %
FVC %PRED-POST: NORMAL L
FVC %PRED-PRE: NORMAL %
FVC PRED: NORMAL L
FVC-POST: NORMAL L
FVC-PRE: NORMAL L
GAW %PRED: NORMAL %
GAW PRED: NORMAL L/S/CMH2O
GAW: NORMAL L/S/CMH2O
IC %PRED: NORMAL %
IC PRED: NORMAL L
IC: NORMAL L
MEP: NORMAL
MIP: NORMAL
MVV %PRED-PRE: NORMAL %
MVV PRED: NORMAL L/MIN
MVV-PRE: NORMAL L/MIN
PEF %PRED-POST: NORMAL %
PEF %PRED-PRE: NORMAL L/SEC
PEF PRED: NORMAL L/SEC
PEF%CHNG: NORMAL
PEF-POST: NORMAL L/SEC
PEF-PRE: NORMAL L/SEC
RAW %PRED: NORMAL %
RAW PRED: NORMAL CMH2O/L/S
RAW: NORMAL CMH2O/L/S
RV %PRED: NORMAL %
RV PRED: NORMAL L
RV: NORMAL L
SVC %PRED: NORMAL %
SVC PRED: NORMAL L
SVC: NORMAL L
TLC %PRED: 83 %
TLC PRED: NORMAL L
TLC: NORMAL L
VA %PRED: NORMAL %
VA PRED: NORMAL L
VA: NORMAL L
VTG %PRED: NORMAL %
VTG PRED: NORMAL L
VTG: NORMAL L

## 2019-08-23 PROCEDURE — 94726 PLETHYSMOGRAPHY LUNG VOLUMES: CPT

## 2019-08-23 PROCEDURE — 94640 AIRWAY INHALATION TREATMENT: CPT

## 2019-08-23 PROCEDURE — 6370000000 HC RX 637 (ALT 250 FOR IP): Performed by: INTERNAL MEDICINE

## 2019-08-23 PROCEDURE — 94060 EVALUATION OF WHEEZING: CPT

## 2019-08-23 PROCEDURE — 94726 PLETHYSMOGRAPHY LUNG VOLUMES: CPT | Performed by: INTERNAL MEDICINE

## 2019-08-23 PROCEDURE — 94729 DIFFUSING CAPACITY: CPT | Performed by: INTERNAL MEDICINE

## 2019-08-23 PROCEDURE — 94729 DIFFUSING CAPACITY: CPT

## 2019-08-23 PROCEDURE — 94060 EVALUATION OF WHEEZING: CPT | Performed by: INTERNAL MEDICINE

## 2019-08-23 RX ORDER — ALBUTEROL SULFATE 90 UG/1
4 AEROSOL, METERED RESPIRATORY (INHALATION) ONCE
Status: COMPLETED | OUTPATIENT
Start: 2019-08-23 | End: 2019-08-23

## 2019-08-23 RX ADMIN — ALBUTEROL SULFATE 4 PUFF: 90 AEROSOL, METERED RESPIRATORY (INHALATION) at 10:25

## 2019-08-23 ASSESSMENT — PULMONARY FUNCTION TESTS
FEV1/FVC_PRE: 69
FEV1/FVC_POST: 79
FEV1_PERCENT_PREDICTED_PRE: 53
FEV1_PERCENT_PREDICTED_POST: 63

## 2019-08-29 ENCOUNTER — HOSPITAL ENCOUNTER (OUTPATIENT)
Dept: CARDIAC REHAB | Age: 84
Setting detail: THERAPIES SERIES
Discharge: HOME | End: 2019-08-29
Payer: MEDICARE

## 2019-08-29 ENCOUNTER — OFFICE VISIT (OUTPATIENT)
Dept: PULMONOLOGY | Age: 84
End: 2019-08-29
Payer: MEDICARE

## 2019-08-29 VITALS
RESPIRATION RATE: 20 BRPM | OXYGEN SATURATION: 95 % | SYSTOLIC BLOOD PRESSURE: 134 MMHG | HEIGHT: 68 IN | WEIGHT: 161 LBS | DIASTOLIC BLOOD PRESSURE: 58 MMHG | HEART RATE: 63 BPM | BODY MASS INDEX: 24.4 KG/M2

## 2019-08-29 DIAGNOSIS — J44.9 CHRONIC OBSTRUCTIVE PULMONARY DISEASE, UNSPECIFIED COPD TYPE (HCC): ICD-10-CM

## 2019-08-29 DIAGNOSIS — J90 PLEURAL EFFUSION: ICD-10-CM

## 2019-08-29 DIAGNOSIS — Z87.891 FORMER SMOKER: ICD-10-CM

## 2019-08-29 DIAGNOSIS — J43.2 CENTRILOBULAR EMPHYSEMA (HCC): ICD-10-CM

## 2019-08-29 DIAGNOSIS — R06.09 DOE (DYSPNEA ON EXERTION): ICD-10-CM

## 2019-08-29 DIAGNOSIS — J44.9 CHRONIC OBSTRUCTIVE PULMONARY DISEASE, UNSPECIFIED COPD TYPE (HCC): Primary | ICD-10-CM

## 2019-08-29 PROCEDURE — G8926 SPIRO NO PERF OR DOC: HCPCS | Performed by: INTERNAL MEDICINE

## 2019-08-29 PROCEDURE — 1036F TOBACCO NON-USER: CPT | Performed by: INTERNAL MEDICINE

## 2019-08-29 PROCEDURE — 99204 OFFICE O/P NEW MOD 45 MIN: CPT | Performed by: INTERNAL MEDICINE

## 2019-08-29 PROCEDURE — 3023F SPIROM DOC REV: CPT | Performed by: INTERNAL MEDICINE

## 2019-08-29 PROCEDURE — G8427 DOCREV CUR MEDS BY ELIG CLIN: HCPCS | Performed by: INTERNAL MEDICINE

## 2019-08-29 PROCEDURE — 1123F ACP DISCUSS/DSCN MKR DOCD: CPT | Performed by: INTERNAL MEDICINE

## 2019-08-29 PROCEDURE — 4040F PNEUMOC VAC/ADMIN/RCVD: CPT | Performed by: INTERNAL MEDICINE

## 2019-08-29 PROCEDURE — G8420 CALC BMI NORM PARAMETERS: HCPCS | Performed by: INTERNAL MEDICINE

## 2019-08-29 PROCEDURE — 94618 PULMONARY STRESS TESTING: CPT | Performed by: INTERNAL MEDICINE

## 2019-08-29 PROCEDURE — 94618 PULMONARY STRESS TESTING: CPT

## 2019-08-29 NOTE — PROGRESS NOTES
REASON FOR CONSULTATION:  Chief Complaint   Patient presents with    New Patient     ref by Dr. Lynn Zaragoza for fluid on the lungs         Consult at request of Maryam Galicia MD     PCP: Maryam Galicia MD    HISTORY OF PRESENT ILLNESS: Maria Eugenia Galeas is a 80y.o. year old male with a history of COPD, former smoker who presents for evaluation of acute on chronic PLASCENCIA and Right sided pleural effusion. Pt states he has had SOB on exertion for >1 year. Symptoms are worsened with heavy lifting, or yard work. Pt has been started on PRN albuterol but does not feel that it alleviates symptoms. Symptoms have been progressive. Asymptomatic at rest.  Recent pfts show moderate copd. He has an associated mild cough with scant yellow thick sputum. This is been present for many years. He denies chest pain, hemoptysis. CT chest shows R sided pleural effusion that has been present since at least April, but was not present on last chest imaging in 2014. He has a home pulse ox and says he sees it drop as low as 75% with exertion, but is above 90% at rest.  He is compliant with symbicort BID that was recently Rx'd but feels like it just squirts into his mouth. Provided with spacer during PFTs that he says helps.           Past Medical History:   Diagnosis Date    Arthritis     BPH with obstruction/lower urinary tract symptoms     Dr. Yenifer Rowe Diverticular disease     Diverticulosis of sigmoid colon with h/o bleeding    Ectropion of right eye     Emphysema of lung (HCC)     PLASCENCIA    Hypercholesteremia     Macular degeneration     Dr. Ever Reed Moderate mitral regurgitation 03/14/2017    with mild Aortic Regurge and diastolic dysfunction    PONV (postoperative nausea and vomiting)     Squamous cell skin cancer     Nose and face    Vitamin D deficiency     Wears glasses        Past Surgical History:   Procedure Laterality Date    CATARACT REMOVAL WITH IMPLANT Bilateral     COLONOSCOPY  06/20/2016    appearing right-sided pleural effusion with adjacent consolidation of  the right lung base, presumably atelectasis    Underlying emphysema with indeterminate pulmonary nodules    RECOMMENDATIONS:  Fleischner Society guidelines for follow-up and management of incidentally  detected pulmonary nodules:    Multiple Solid Nodules:    Nodule size less than 6 mm  In a low-risk patient, no routine follow-up. In a high-risk patient, optional CT at 12 months. - Low risk patients include individuals with minimal or absent history of  smoking and other known risk factors. - High risk patients include individuals with a history or smoking or known  risk factors. Radiology 2017 http://pubs. rsna.org/doi/full/10.1148/radiol. 7442993727         CTPA: No results found for this or any previous visit. CXR PA/LAT:   Results for orders placed during the hospital encounter of 12/02/14   XR CHEST STANDARD TWO VW    Narrative PA & LATERAL CHEST X-RAY     INDICATION:     Chronic cough        COMPARISON:  none     FINDINGS:  The cardiomediastinal silhouette and pulmonary  vascularity are within normal limits. Pleural and parenchymal scar is noted bilaterally. A benign  calcified granuloma is noted in the right lower lobe. Both lungs  are hyperexpanded but otherwise clear. .          Impression IMPRESSION: NO ACUTE CARDIOPULMONARY ABNORMALITY. CHRONIC LUNG  DISEASE WITH UPPER LOBE EMPHYSEMA AND SUPERIMPOSED PLEURAL AND  PARENCHYMAL SCAR. CXR portable: No results found for this or any previous visit. Pulmonary function testing  Reduced FEV1/FVC ratio, FEV1 of 63%, + bronchodilator response. Assessment/Plan:       Diagnosis Orders   1. Chronic obstructive pulmonary disease, unspecified COPD type ([address]  6 Minute Walk Test   2. Centrilobular emphysema ([address]     3. Former smoker     4. Pleural effusion     5.  PLASCENCIA (dyspnea on exertion)           Problem List Items Addressed This Visit        Pulmonary Problems

## 2019-09-09 ENCOUNTER — TELEPHONE (OUTPATIENT)
Dept: FAMILY MEDICINE CLINIC | Age: 84
End: 2019-09-09

## 2019-09-09 NOTE — TELEPHONE ENCOUNTER
He should have refills on the medication and should continue this medication.   He can do the lab work now

## 2019-09-09 NOTE — TELEPHONE ENCOUNTER
Started on thyroid med on 8/19. Supposed to have lab test 4-6 weeks to retest. Wanting to know if needs to have test prior to finishing med, or if after med is gone. Please advise.  Please call Farnaz GILL, back at [phone]

## 2019-09-10 DIAGNOSIS — R79.89 ABNORMAL THYROID BLOOD TEST: ICD-10-CM

## 2019-09-10 LAB
T4 FREE: 1.3 NG/DL (ref 0.9–1.8)
TSH SERPL DL<=0.05 MIU/L-ACNC: 8.33 UIU/ML (ref 0.27–4.2)

## 2019-09-12 ENCOUNTER — TELEPHONE (OUTPATIENT)
Dept: FAMILY MEDICINE CLINIC | Age: 84
End: 2019-09-12

## 2019-09-12 RX ORDER — LEVOTHYROXINE SODIUM 0.05 MG/1
50 TABLET ORAL DAILY
Qty: 30 TABLET | Refills: 1 | Status: SHIPPED | OUTPATIENT
Start: 2019-09-12 | End: 2019-10-17 | Stop reason: SDUPTHER

## 2019-09-13 ENCOUNTER — TELEPHONE (OUTPATIENT)
Dept: ORTHOPEDIC SURGERY | Age: 84
End: 2019-09-13

## 2019-09-16 ENCOUNTER — TELEPHONE (OUTPATIENT)
Dept: FAMILY MEDICINE CLINIC | Age: 84
End: 2019-09-16

## 2019-09-16 NOTE — TELEPHONE ENCOUNTER
all requesting a pre auth on the pt advair/fluticasone-salmeterol 500-50. Insurance does not want to cover.   Copy is in media  They need something else

## 2019-09-20 NOTE — TELEPHONE ENCOUNTER
Advair Diskus 500-50MCG/DOSE aerosol powder  The drug you asked for is non-formulary (not on [employer] list of preferred drugs). Before the drug can be covered, we need more information. Please ask your prescriber to explain to [facility] why the preferred drugs have not worked for your medical condition and/or would have bad side effects. The list of drugs offered by your [facility] plan can be found in your Prescription Drug Guide (PDG). You can view your PDG online at [employer], or request a printed list be mailed to you from this website or by calling [facility] at [phone] (TTD: 036), 8 a.m. 8 p.m., seven days a week. If you have not tried the preferred drugs, including Breo Ellipta powder for inhalation, please talk to your health care provider about prescribing one of these for you. Some preferred drugs may require an additional review and approval by [facility]. This determination was based on the [address] and [employer] Non-Formulary Exceptions Coverage Policy. An appeal request cannot be accepted for this PA.  Please contact your   LETTER ATTACHED

## 2019-09-23 RX ORDER — FLUTICASONE FUROATE AND VILANTEROL 200; 25 UG/1; UG/1
1 POWDER RESPIRATORY (INHALATION) DAILY
Qty: 1 EACH | Refills: 5 | Status: SHIPPED | OUTPATIENT
Start: 2019-09-23 | End: 2019-11-21

## 2019-10-15 ENCOUNTER — TELEPHONE (OUTPATIENT)
Dept: FAMILY MEDICINE CLINIC | Age: 84
End: 2019-10-15

## 2019-10-15 DIAGNOSIS — E03.9 HYPOTHYROIDISM, UNSPECIFIED TYPE: Primary | ICD-10-CM

## 2019-10-16 DIAGNOSIS — E03.9 HYPOTHYROIDISM, UNSPECIFIED TYPE: ICD-10-CM

## 2019-10-16 LAB
T4 FREE: 1.4 NG/DL (ref 0.9–1.8)
TSH REFLEX: 4.74 UIU/ML (ref 0.27–4.2)

## 2019-10-17 RX ORDER — LEVOTHYROXINE SODIUM 0.05 MG/1
50 TABLET ORAL DAILY
Qty: 90 TABLET | Refills: 0 | Status: SHIPPED | OUTPATIENT
Start: 2019-10-17 | End: 2020-01-13

## 2019-11-14 RX ORDER — FINASTERIDE 5 MG/1
TABLET, FILM COATED ORAL
Qty: 90 TABLET | Refills: 3 | Status: SHIPPED | OUTPATIENT
Start: 2019-11-14 | End: 2020-10-12

## 2019-11-21 ENCOUNTER — OFFICE VISIT (OUTPATIENT)
Dept: PULMONOLOGY | Age: 84
End: 2019-11-21
Payer: MEDICARE

## 2019-11-21 VITALS
DIASTOLIC BLOOD PRESSURE: 69 MMHG | SYSTOLIC BLOOD PRESSURE: 137 MMHG | WEIGHT: 164 LBS | HEIGHT: 68 IN | HEART RATE: 73 BPM | OXYGEN SATURATION: 94 % | RESPIRATION RATE: 16 BRPM | TEMPERATURE: 97.1 F | BODY MASS INDEX: 24.86 KG/M2

## 2019-11-21 DIAGNOSIS — J44.9 MODERATE COPD (CHRONIC OBSTRUCTIVE PULMONARY DISEASE) (HCC): ICD-10-CM

## 2019-11-21 DIAGNOSIS — J90 PLEURAL EFFUSION: ICD-10-CM

## 2019-11-21 DIAGNOSIS — J43.2 CENTRILOBULAR EMPHYSEMA (HCC): Primary | Chronic | ICD-10-CM

## 2019-11-21 PROCEDURE — G8484 FLU IMMUNIZE NO ADMIN: HCPCS | Performed by: INTERNAL MEDICINE

## 2019-11-21 PROCEDURE — 99213 OFFICE O/P EST LOW 20 MIN: CPT | Performed by: INTERNAL MEDICINE

## 2019-11-21 PROCEDURE — G8926 SPIRO NO PERF OR DOC: HCPCS | Performed by: INTERNAL MEDICINE

## 2019-11-21 PROCEDURE — G8420 CALC BMI NORM PARAMETERS: HCPCS | Performed by: INTERNAL MEDICINE

## 2019-11-21 PROCEDURE — 1123F ACP DISCUSS/DSCN MKR DOCD: CPT | Performed by: INTERNAL MEDICINE

## 2019-11-21 PROCEDURE — 3023F SPIROM DOC REV: CPT | Performed by: INTERNAL MEDICINE

## 2019-11-21 PROCEDURE — 1036F TOBACCO NON-USER: CPT | Performed by: INTERNAL MEDICINE

## 2019-11-21 PROCEDURE — G8427 DOCREV CUR MEDS BY ELIG CLIN: HCPCS | Performed by: INTERNAL MEDICINE

## 2019-11-21 PROCEDURE — 4040F PNEUMOC VAC/ADMIN/RCVD: CPT | Performed by: INTERNAL MEDICINE

## 2019-12-04 ENCOUNTER — TELEPHONE (OUTPATIENT)
Dept: FAMILY MEDICINE CLINIC | Age: 84
End: 2019-12-04

## 2019-12-04 DIAGNOSIS — E03.9 ACQUIRED HYPOTHYROIDISM: Primary | ICD-10-CM

## 2019-12-05 PROBLEM — E03.9 ACQUIRED HYPOTHYROIDISM: Status: ACTIVE | Noted: 2019-12-05

## 2019-12-09 DIAGNOSIS — E03.9 ACQUIRED HYPOTHYROIDISM: ICD-10-CM

## 2019-12-09 LAB
T4 FREE: 1.4 NG/DL (ref 0.9–1.8)
TSH SERPL DL<=0.05 MIU/L-ACNC: 4.9 UIU/ML (ref 0.27–4.2)

## 2020-01-13 RX ORDER — LEVOTHYROXINE SODIUM 0.05 MG/1
TABLET ORAL
Qty: 90 TABLET | Refills: 0 | Status: SHIPPED | OUTPATIENT
Start: 2020-01-13 | End: 2020-02-03

## 2020-01-14 ENCOUNTER — HOSPITAL ENCOUNTER (OUTPATIENT)
Age: 85
Discharge: HOME | End: 2020-01-14
Payer: MEDICARE

## 2020-01-14 ENCOUNTER — TELEPHONE (OUTPATIENT)
Dept: FAMILY MEDICINE CLINIC | Age: 85
End: 2020-01-14

## 2020-01-14 ENCOUNTER — HOSPITAL ENCOUNTER (OUTPATIENT)
Dept: GENERAL RADIOLOGY | Age: 85
Discharge: HOME | End: 2020-01-14
Payer: MEDICARE

## 2020-01-14 ENCOUNTER — OFFICE VISIT (OUTPATIENT)
Dept: FAMILY MEDICINE CLINIC | Age: 85
End: 2020-01-14
Payer: MEDICARE

## 2020-01-14 VITALS
SYSTOLIC BLOOD PRESSURE: 134 MMHG | DIASTOLIC BLOOD PRESSURE: 64 MMHG | WEIGHT: 162.8 LBS | OXYGEN SATURATION: 92 % | BODY MASS INDEX: 24.75 KG/M2 | HEART RATE: 67 BPM

## 2020-01-14 LAB
A/G RATIO: 1.3 (ref 1.1–2.2)
ALBUMIN SERPL-MCNC: 3.8 G/DL (ref 3.4–5)
ALP BLD-CCNC: 82 U/L (ref 40–129)
ALT SERPL-CCNC: 8 U/L (ref 10–40)
ANION GAP SERPL CALCULATED.3IONS-SCNC: 13 MMOL/L (ref 3–16)
AST SERPL-CCNC: 17 U/L (ref 15–37)
BASOPHILS ABSOLUTE: 0.1 K/UL (ref 0–0.2)
BASOPHILS RELATIVE PERCENT: 0.7 %
BILIRUB SERPL-MCNC: <0.2 MG/DL (ref 0–1)
BUN BLDV-MCNC: 18 MG/DL (ref 7–20)
CALCIUM SERPL-MCNC: 9.4 MG/DL (ref 8.3–10.6)
CHLORIDE BLD-SCNC: 102 MMOL/L (ref 99–110)
CO2: 27 MMOL/L (ref 21–32)
CREAT SERPL-MCNC: 1 MG/DL (ref 0.8–1.3)
EOSINOPHILS ABSOLUTE: 0.1 K/UL (ref 0–0.6)
EOSINOPHILS RELATIVE PERCENT: 1.6 %
GFR AFRICAN AMERICAN: >60
GFR NON-AFRICAN AMERICAN: >60
GLOBULIN: 3 G/DL
GLUCOSE BLD-MCNC: 84 MG/DL (ref 70–99)
HCT VFR BLD CALC: 39.2 % (ref 40.5–52.5)
HEMOGLOBIN: 13.1 G/DL (ref 13.5–17.5)
LYMPHOCYTES ABSOLUTE: 0.7 K/UL (ref 1–5.1)
LYMPHOCYTES RELATIVE PERCENT: 7.3 %
MCH RBC QN AUTO: 32 PG (ref 26–34)
MCHC RBC AUTO-ENTMCNC: 33.5 G/DL (ref 31–36)
MCV RBC AUTO: 95.5 FL (ref 80–100)
MONOCYTES ABSOLUTE: 1 K/UL (ref 0–1.3)
MONOCYTES RELATIVE PERCENT: 11.1 %
NEUTROPHILS ABSOLUTE: 7.3 K/UL (ref 1.7–7.7)
NEUTROPHILS RELATIVE PERCENT: 79.3 %
PDW BLD-RTO: 14.1 % (ref 12.4–15.4)
PLATELET # BLD: 325 K/UL (ref 135–450)
PMV BLD AUTO: 8.6 FL (ref 5–10.5)
POTASSIUM SERPL-SCNC: 4.3 MMOL/L (ref 3.5–5.1)
RBC # BLD: 4.11 M/UL (ref 4.2–5.9)
SODIUM BLD-SCNC: 142 MMOL/L (ref 136–145)
T4 FREE: 1.1 NG/DL (ref 0.9–1.8)
TOTAL PROTEIN: 6.8 G/DL (ref 6.4–8.2)
TSH REFLEX: 4.74 UIU/ML (ref 0.27–4.2)
WBC # BLD: 9.2 K/UL (ref 4–11)

## 2020-01-14 PROCEDURE — 4040F PNEUMOC VAC/ADMIN/RCVD: CPT | Performed by: INTERNAL MEDICINE

## 2020-01-14 PROCEDURE — 90653 IIV ADJUVANT VACCINE IM: CPT | Performed by: INTERNAL MEDICINE

## 2020-01-14 PROCEDURE — G8926 SPIRO NO PERF OR DOC: HCPCS | Performed by: INTERNAL MEDICINE

## 2020-01-14 PROCEDURE — G8484 FLU IMMUNIZE NO ADMIN: HCPCS | Performed by: INTERNAL MEDICINE

## 2020-01-14 PROCEDURE — 3023F SPIROM DOC REV: CPT | Performed by: INTERNAL MEDICINE

## 2020-01-14 PROCEDURE — 1123F ACP DISCUSS/DSCN MKR DOCD: CPT | Performed by: INTERNAL MEDICINE

## 2020-01-14 PROCEDURE — 1036F TOBACCO NON-USER: CPT | Performed by: INTERNAL MEDICINE

## 2020-01-14 PROCEDURE — G8427 DOCREV CUR MEDS BY ELIG CLIN: HCPCS | Performed by: INTERNAL MEDICINE

## 2020-01-14 PROCEDURE — 72100 X-RAY EXAM L-S SPINE 2/3 VWS: CPT

## 2020-01-14 PROCEDURE — G0008 ADMIN INFLUENZA VIRUS VAC: HCPCS | Performed by: INTERNAL MEDICINE

## 2020-01-14 PROCEDURE — 99214 OFFICE O/P EST MOD 30 MIN: CPT | Performed by: INTERNAL MEDICINE

## 2020-01-14 PROCEDURE — 36415 COLL VENOUS BLD VENIPUNCTURE: CPT | Performed by: INTERNAL MEDICINE

## 2020-01-14 PROCEDURE — G8420 CALC BMI NORM PARAMETERS: HCPCS | Performed by: INTERNAL MEDICINE

## 2020-01-14 RX ORDER — TAMSULOSIN HYDROCHLORIDE 0.4 MG/1
0.4 CAPSULE ORAL DAILY
Qty: 30 CAPSULE | Refills: 5 | Status: SHIPPED | OUTPATIENT
Start: 2020-01-14 | End: 2020-04-27

## 2020-01-14 ASSESSMENT — ENCOUNTER SYMPTOMS
SINUS PRESSURE: 0
VOMITING: 0
SORE THROAT: 0
EYE DISCHARGE: 0
ABDOMINAL PAIN: 0
BLOOD IN STOOL: 1
SINUS PAIN: 0
COUGH: 0
NAUSEA: 0
DIARRHEA: 0
SHORTNESS OF BREATH: 0
RHINORRHEA: 0
EYE REDNESS: 0
WHEEZING: 0

## 2020-01-14 NOTE — TELEPHONE ENCOUNTER
Spoke to daughter Moises White and apologized. They completed the xray. Noted. I did not see this earlier. I am sorry. Tali, can you contact the bre rep today?

## 2020-01-14 NOTE — PROGRESS NOTES
Vaccine Information Sheet, \"Influenza - Inactivated\"  given to Chava Person, or parent/legal guardian of  Chava Person and verbalized understanding. Patient responses:    Have you ever had a reaction to a flu vaccine? No  Do you have any current illness? No  Have you ever had Guillian Kirksey Syndrome? No  Do you have a serious allergy to any of the follow: Neomycin, Polymyxin, Thimerosal, eggs or egg products? No    Flu vaccine given per order. Please see immunization tab. Risks and benefits explained. Current VIS given.       Immunizations Administered     Name Date Dose Route    Influenza, Triv, inactivated, subunit, adjuvanted, IM (Fluad 65 yrs and older) 1/14/2020 0.5 mL Intramuscular    Site: Deltoid- Left    Lot: [id number]    [address]: 86386-297-82
Right Ear: Tympanic membrane, ear canal and external ear normal.      Left Ear: Tympanic membrane, ear canal and external ear normal.      Nose: Nose normal. No congestion or rhinorrhea. Mouth/Throat:      Mouth: Mucous membranes are moist.      Pharynx: Oropharynx is clear. No oropharyngeal exudate or posterior oropharyngeal erythema. Eyes:      General:         Right eye: No discharge. Left eye: No discharge. Conjunctiva/sclera: Conjunctivae normal.      Pupils: Pupils are equal, round, and reactive to light. Neck:      Musculoskeletal: Neck supple. Cardiovascular:      Rate and Rhythm: Normal rate and regular rhythm. Heart sounds: Normal heart sounds. No murmur. Pulmonary:      Effort: Pulmonary effort is normal. No respiratory distress. Breath sounds: Normal breath sounds. No wheezing or rales. Abdominal:      General: Bowel sounds are normal. There is no distension. Palpations: Abdomen is soft. There is no mass. Tenderness: There is no tenderness. There is no guarding or rebound. Genitourinary:     Prostate: Enlarged. Not tender and no nodules present. Rectum: Tenderness present. No anal fissure, external hemorrhoid or internal hemorrhoid. Abnormal anal tone. Comments: Decreased anal sphincter tone but not complete  Musculoskeletal:         General: No tenderness. Lymphadenopathy:      Cervical: No cervical adenopathy. Skin:     General: Skin is warm and dry. Capillary Refill: Capillary refill takes less than 2 seconds. Findings: No rash. Neurological:      Cranial Nerves: No cranial nerve deficit. ASSESSMENT/PLAN:  1. Hypothyroidism, unspecified type  - TSH with Reflex    2. Pulmonary emphysema, unspecified emphysema type ([address]  Will provide breo sample. I do not want him to stop his inhaler as he has been doing much better. Flu shot today  Fu with pulmonology if needed    3.  Full incontinence of feces  Discussed differential

## 2020-01-14 NOTE — TELEPHONE ENCOUNTER
Can someone also contact breo rep - we need more samples for him if they can get us some I am happy to sign

## 2020-01-14 NOTE — TELEPHONE ENCOUNTER
PT's daughter called in stating that Dr. Rissa Quezada wanted PT to have an xray of his back and they are now at the [facility] facility in [facility] but the order is not in [facility] [address]. They would like to have this order placed ASAP since they are at the facility waiting.

## 2020-01-15 ENCOUNTER — TELEPHONE (OUTPATIENT)
Dept: FAMILY MEDICINE CLINIC | Age: 85
End: 2020-01-15

## 2020-01-17 ENCOUNTER — OFFICE VISIT (OUTPATIENT)
Dept: SURGERY | Age: 85
End: 2020-01-17
Payer: MEDICARE

## 2020-01-17 ENCOUNTER — TELEPHONE (OUTPATIENT)
Dept: FAMILY MEDICINE CLINIC | Age: 85
End: 2020-01-17

## 2020-01-17 VITALS
SYSTOLIC BLOOD PRESSURE: 132 MMHG | RESPIRATION RATE: 20 BRPM | HEIGHT: 72 IN | BODY MASS INDEX: 22.08 KG/M2 | DIASTOLIC BLOOD PRESSURE: 71 MMHG | WEIGHT: 163 LBS

## 2020-01-17 PROCEDURE — 4040F PNEUMOC VAC/ADMIN/RCVD: CPT | Performed by: SURGERY

## 2020-01-17 PROCEDURE — G8420 CALC BMI NORM PARAMETERS: HCPCS | Performed by: SURGERY

## 2020-01-17 PROCEDURE — 1036F TOBACCO NON-USER: CPT | Performed by: SURGERY

## 2020-01-17 PROCEDURE — G8482 FLU IMMUNIZE ORDER/ADMIN: HCPCS | Performed by: SURGERY

## 2020-01-17 PROCEDURE — 99203 OFFICE O/P NEW LOW 30 MIN: CPT | Performed by: SURGERY

## 2020-01-17 PROCEDURE — 1123F ACP DISCUSS/DSCN MKR DOCD: CPT | Performed by: SURGERY

## 2020-01-17 PROCEDURE — G8427 DOCREV CUR MEDS BY ELIG CLIN: HCPCS | Performed by: SURGERY

## 2020-01-17 NOTE — PROGRESS NOTES
[address] [facility]  [address] [facility]. [place] [id number]  Dept: [phone]  Dept Fax: [phone]: [phone]    Visit Date: 1/17/2020    Taryn Nguyen is a 80 y.o. male who presents today for: Established New Doctor (fecal incontinence )      HPI:       Taryn Nguyen is a 80 y.o. male referred to me for fecal incontinence    Aquiles Rodriguez had an episode of severe constipation about 3 weeks ago, but then after that was having issues with loss of control of solid and liquid stool. Fortunately, over the past few days things have been returning back to normal.  He denies blood in his stool. He denies fever, chills, nausea, vomiting, food intolerance, weight loss, abdominal pain. His daughter-in-law also mentioned his intermittent back pain. Patient's problem list, medications, past medical, surgical, family, and social histories were reviewed and updated in the chart as indicated today.     Past Medical History:   Diagnosis Date    Arthritis     BPH with obstruction/lower urinary tract symptoms     Dr. Justo Randolph Diverticular disease     Diverticulosis of sigmoid colon with h/o bleeding    Ectropion of right eye     Emphysema of lung (HCC)     PLASCENCIA    Hypercholesteremia     Macular degeneration     Dr. Isra Harper Moderate mitral regurgitation 03/14/2017    with mild Aortic Regurge and diastolic dysfunction    PONV (postoperative nausea and vomiting)     Squamous cell skin cancer     Nose and face    Vitamin D deficiency     Wears glasses        Past Surgical History:   Procedure Laterality Date    CATARACT REMOVAL WITH IMPLANT Bilateral     COLONOSCOPY  06/20/2016    MOHS SURGERY      Dr. Enrigue Bernheim       Family History: Denies family history of colon cancer    Social History:   Social History     Tobacco Use    Smoking status: Former Smoker    Smokeless tobacco: Never Used    Tobacco comment: quit 50 years ago   Substance Use Topics    Alcohol use: Yes     Alcohol/week: 0.0 standard drinks     Comment: infrequently      Tobacco cessation counseling provided as appropriate. REVIEW OF SYSTEMS:    Pertinent positives and negatives are mentioned in the HPI above. Otherwise, all other systems were reviewed and negative. Objective:     Physical Exam   /71 (Site: Left Upper Arm, Position: Sitting, Cuff Size: Medium Adult)   Resp 20   Ht 6' (1.829 m)   Wt 163 lb (73.9 kg)   BMI 22.11 kg/m²   Constitutional: Appears well-developed and well-nourished. Grooming appropriate. No gross deformities. Body mass index is 22.11 kg/m². Eyes: No scleral icterus. Conjunctiva/lids normal. Vision intact grossly. Pupils equal/symmetric, reactive bilaterally. ENT: External ears/nose without defect, scars, or masses. Hearing grossly intact. No facial deformity. Lips normal, normal dentition. Neck: No masses. Trachea midline. No crepitus. Thyroid not enlarged. Cardiovascular: Normal rate. No peripheral edema. Abdominal aorta normal size to palpation. Pulmonary/Chest: Effort normal. No respiratory distress. No wheezes. No use of accessory muscles. Musculoskeletal: Normal range of motion x all 4 extremities and head/neck, without deformity, pain, or crepitus, with normal strength and tone. Normal gait. Nails without clubbing or cyanosis. Neurological: Alert and oriented to person, place, and time. No gross deficits. Sensation intact. Skin: Skin is dry. No rashes noted. No pallor. No induration of nodules. Psychiatric: Normal mood and affect. Behavior normal. Oriented to person, place, and time. Judgment and insight reasonable. Abdominal/wound: Soft, nontender, nondistended    ANOSCOPY:    Chaperone/MA present in room during entire exam. Patient was placed in knee-chest position or left side down position depending on comfort. Exam table manipulated for proper visualization and lighting. Buttocks spread. Inspection reveals: no perianal skin lesions, excoriation, or skin tags. Digital exam performed with lubricated finger revealing: no masses, induration, or tenderness. No gross blood. Normal tone. Lubricated anoscope inserted gently into anus and withdrawn for visualization of distal rectum and anal canal: Mucosa appeared normal, without masses or evidence of proctitis. Moderately inflamed hemorrhoidal tissue visible. No bleeding noted. Anoscope removed without difficulty. Pertinent recent lab and radiology results reviewed. Assessment/Plan:     A/P:  New problem(s): Fecal incontinence, hemorrhoids  Established problem(s): advanced age  Additional workup/treatment planned: fiber, miralax, water  Risk of complications/morbidity: moderate    On exam he only has moderately inflamed internal hemorrhoids. No evidence of mass or polyps. What I suspect happened is after his episode of severe constipation, he had stretching of the sphincter muscles which resulted in temporary incontinence. Fortunately, his symptoms are improving spontaneously. I do not think any additional work-up or treatment is needed other than keeping stools soft in the future. I discussed that if he has return of symptoms further treatment or investigation would be warranted. Family is satisfied with this plan. I provided written information in the After Visit Summary AVS Regarding: fiber, avoiding constipation    DISPOSITION:  F/u PRN    My findings will be relayed to consulting practitioner or PCP via Epic    Note completed using dictation software, please excuse any errors.     Electronically signed by Sharda Victor MD on 1/17/2020 at 9:11 AM

## 2020-01-17 NOTE — PATIENT INSTRUCTIONS
STOOL REGIMEN for anorectal issues:    1) Eat a healthy diet with lots of fruits and vegetables. Exercise and be active. 2) Use a fiber supplement twice daily (Metamucil, Benefiber, Konsyl, Citrucel, generic brand, etc) per package instructions. - Women should aim for 25 grams of fiber daily.   - Men should aim for 30-35 grams of fiber daily. 3) Drink 6-8 glasses of water per day. 4) MiraLax can be used as needed daily to help lubricate stool. 5) Colace stool softeners can be used as needed as well. Avoid Senna and sennakot laxative products, as they may damage the colon with long-term use. Stools should be soft, mushy but formed consistency, not diarrhea, without the need to strain. Warm water sitz baths (sit in a tub filled with 3-4 inches of warm water) should be done 1-2 times daily for 5 min to help with hygiene and relaxation. DO NOT STRAIN ON THE TOILET. DO NOT READ OR PLAY CELL PHONE GAMES ON THE TOILET. ONLY A FEW MINUTES SHOULD BE SPENT SITTING ON THE COMMODE.

## 2020-01-17 NOTE — TELEPHONE ENCOUNTER
FYI- wanted to let you know pt is feeling better. Thinks he just pulled a muscle when he was constipated. Does not feel any further testing is necessary.  Just wanted to let you know

## 2020-02-03 RX ORDER — LEVOTHYROXINE SODIUM 0.05 MG/1
TABLET ORAL
Qty: 30 TABLET | Refills: 0 | Status: SHIPPED | OUTPATIENT
Start: 2020-02-03 | End: 2020-04-27 | Stop reason: SDUPTHER

## 2020-04-27 RX ORDER — TAMSULOSIN HYDROCHLORIDE 0.4 MG/1
CAPSULE ORAL
Qty: 90 CAPSULE | Refills: 4 | Status: SHIPPED | OUTPATIENT
Start: 2020-04-27

## 2020-04-27 RX ORDER — LEVOTHYROXINE SODIUM 0.05 MG/1
TABLET ORAL
Qty: 90 TABLET | Refills: 3 | Status: SHIPPED | OUTPATIENT
Start: 2020-04-27

## 2020-06-21 NOTE — TELEPHONE ENCOUNTER
CT chest is not being covered.  pls call insurance and see if we can do a prior auth or if I need to do a peer to peer
PT has [employer] PPO, this overturns medicare at the primary in this case.  Codes provided are not being picked up by [facility] for CT scan
Statement Selected

## 2020-07-14 ENCOUNTER — OFFICE VISIT (OUTPATIENT)
Dept: FAMILY MEDICINE CLINIC | Age: 85
End: 2020-07-14
Payer: MEDICARE

## 2020-07-14 VITALS
RESPIRATION RATE: 16 BRPM | OXYGEN SATURATION: 93 % | HEIGHT: 69 IN | SYSTOLIC BLOOD PRESSURE: 140 MMHG | WEIGHT: 156 LBS | HEART RATE: 71 BPM | BODY MASS INDEX: 23.11 KG/M2 | DIASTOLIC BLOOD PRESSURE: 73 MMHG

## 2020-07-14 LAB
A/G RATIO: 1.3 (ref 1.1–2.2)
ALBUMIN SERPL-MCNC: 3.9 G/DL (ref 3.4–5)
ALP BLD-CCNC: 81 U/L (ref 40–129)
ALT SERPL-CCNC: 8 U/L (ref 10–40)
ANION GAP SERPL CALCULATED.3IONS-SCNC: 13 MMOL/L (ref 3–16)
AST SERPL-CCNC: 19 U/L (ref 15–37)
BASOPHILS ABSOLUTE: 0.1 K/UL (ref 0–0.2)
BASOPHILS RELATIVE PERCENT: 0.8 %
BILIRUB SERPL-MCNC: 0.4 MG/DL (ref 0–1)
BUN BLDV-MCNC: 26 MG/DL (ref 7–20)
CALCIUM SERPL-MCNC: 9.1 MG/DL (ref 8.3–10.6)
CHLORIDE BLD-SCNC: 103 MMOL/L (ref 99–110)
CO2: 26 MMOL/L (ref 21–32)
CREAT SERPL-MCNC: 1 MG/DL (ref 0.8–1.3)
EOSINOPHILS ABSOLUTE: 0.2 K/UL (ref 0–0.6)
EOSINOPHILS RELATIVE PERCENT: 1.9 %
GFR AFRICAN AMERICAN: >60
GFR NON-AFRICAN AMERICAN: >60
GLOBULIN: 2.9 G/DL
GLUCOSE BLD-MCNC: 93 MG/DL (ref 70–99)
HCT VFR BLD CALC: 41 % (ref 40.5–52.5)
HEMOGLOBIN: 13.6 G/DL (ref 13.5–17.5)
LYMPHOCYTES ABSOLUTE: 0.9 K/UL (ref 1–5.1)
LYMPHOCYTES RELATIVE PERCENT: 9.2 %
MCH RBC QN AUTO: 32.2 PG (ref 26–34)
MCHC RBC AUTO-ENTMCNC: 33.3 G/DL (ref 31–36)
MCV RBC AUTO: 96.8 FL (ref 80–100)
MONOCYTES ABSOLUTE: 0.9 K/UL (ref 0–1.3)
MONOCYTES RELATIVE PERCENT: 9.1 %
NEUTROPHILS ABSOLUTE: 7.9 K/UL (ref 1.7–7.7)
NEUTROPHILS RELATIVE PERCENT: 79 %
PDW BLD-RTO: 13.9 % (ref 12.4–15.4)
PLATELET # BLD: 214 K/UL (ref 135–450)
PMV BLD AUTO: 9.9 FL (ref 5–10.5)
POTASSIUM SERPL-SCNC: 4.5 MMOL/L (ref 3.5–5.1)
RBC # BLD: 4.23 M/UL (ref 4.2–5.9)
SODIUM BLD-SCNC: 142 MMOL/L (ref 136–145)
T4 FREE: 1.4 NG/DL (ref 0.9–1.8)
TOTAL PROTEIN: 6.8 G/DL (ref 6.4–8.2)
TSH REFLEX: 4.44 UIU/ML (ref 0.27–4.2)
VITAMIN B-12: 464 PG/ML (ref 211–911)
WBC # BLD: 10 K/UL (ref 4–11)

## 2020-07-14 PROCEDURE — 1123F ACP DISCUSS/DSCN MKR DOCD: CPT | Performed by: INTERNAL MEDICINE

## 2020-07-14 PROCEDURE — G8926 SPIRO NO PERF OR DOC: HCPCS | Performed by: INTERNAL MEDICINE

## 2020-07-14 PROCEDURE — G0439 PPPS, SUBSEQ VISIT: HCPCS | Performed by: INTERNAL MEDICINE

## 2020-07-14 PROCEDURE — 99213 OFFICE O/P EST LOW 20 MIN: CPT | Performed by: INTERNAL MEDICINE

## 2020-07-14 PROCEDURE — 4040F PNEUMOC VAC/ADMIN/RCVD: CPT | Performed by: INTERNAL MEDICINE

## 2020-07-14 PROCEDURE — G8427 DOCREV CUR MEDS BY ELIG CLIN: HCPCS | Performed by: INTERNAL MEDICINE

## 2020-07-14 PROCEDURE — 3023F SPIROM DOC REV: CPT | Performed by: INTERNAL MEDICINE

## 2020-07-14 PROCEDURE — G8420 CALC BMI NORM PARAMETERS: HCPCS | Performed by: INTERNAL MEDICINE

## 2020-07-14 PROCEDURE — 1036F TOBACCO NON-USER: CPT | Performed by: INTERNAL MEDICINE

## 2020-07-14 PROCEDURE — 36415 COLL VENOUS BLD VENIPUNCTURE: CPT | Performed by: INTERNAL MEDICINE

## 2020-07-14 RX ORDER — OMEPRAZOLE 40 MG/1
40 CAPSULE, DELAYED RELEASE ORAL
Qty: 30 CAPSULE | Refills: 5 | Status: SHIPPED | OUTPATIENT
Start: 2020-07-14

## 2020-07-14 RX ORDER — ZOSTER VACCINE RECOMBINANT, ADJUVANTED 50 MCG/0.5
0.5 KIT INTRAMUSCULAR SEE ADMIN INSTRUCTIONS
Qty: 0.5 ML | Refills: 0 | Status: SHIPPED | OUTPATIENT
Start: 2020-07-14 | End: 2020-07-29

## 2020-07-14 RX ORDER — ZOSTER VACCINE RECOMBINANT, ADJUVANTED 50 MCG/0.5
0.5 KIT INTRAMUSCULAR SEE ADMIN INSTRUCTIONS
Qty: 0.5 ML | Refills: 0 | Status: SHIPPED | OUTPATIENT
Start: 2020-07-14 | End: 2020-07-14

## 2020-07-14 ASSESSMENT — ENCOUNTER SYMPTOMS
DIARRHEA: 0
VOMITING: 0
WHEEZING: 0
BLOOD IN STOOL: 0
NAUSEA: 0
SHORTNESS OF BREATH: 1
ABDOMINAL PAIN: 0
COUGH: 1

## 2020-07-14 ASSESSMENT — PATIENT HEALTH QUESTIONNAIRE - PHQ9
2. FEELING DOWN, DEPRESSED OR HOPELESS: 1
1. LITTLE INTEREST OR PLEASURE IN DOING THINGS: 0
SUM OF ALL RESPONSES TO PHQ QUESTIONS 1-9: 1
SUM OF ALL RESPONSES TO PHQ QUESTIONS 1-9: 1
SUM OF ALL RESPONSES TO PHQ9 QUESTIONS 1 & 2: 1

## 2020-07-14 ASSESSMENT — LIFESTYLE VARIABLES: HOW OFTEN DO YOU HAVE A DRINK CONTAINING ALCOHOL: 0

## 2020-07-14 NOTE — PATIENT INSTRUCTIONS
Personalized Preventive Plan for Sheron Holter - 7/14/2020  Medicare offers a range of preventive health benefits. Some of the tests and screenings are paid in full while other may be subject to a deductible, co-insurance, and/or copay. Some of these benefits include a comprehensive review of your medical history including lifestyle, illnesses that may run in your family, and various assessments and screenings as appropriate. After reviewing your medical record and screening and assessments performed today your provider may have ordered immunizations, labs, imaging, and/or referrals for you. A list of these orders (if applicable) as well as your Preventive Care list are included within your After Visit Summary for your review. Other Preventive Recommendations:    · A preventive eye exam performed by an eye specialist is recommended every 1-2 years to screen for glaucoma; cataracts, macular degeneration, and other eye disorders. · A preventive dental visit is recommended every 6 months. · Try to get at least 150 minutes of exercise per week or 10,000 steps per day on a pedometer . · Order or download the FREE \"Exercise & Physical Activity: Your Everyday Guide\" from [employer]. Call 5-503.137.5434 or search [employer] online. · You need 3879-0712 mg of calcium and 6561-1481 IU of vitamin D per day. It is possible to meet your calcium requirement with diet alone, but a vitamin D supplement is usually necessary to meet this goal.  · When exposed to the sun, use a sunscreen that protects against both UVA and UVB radiation with an SPF of 30 or greater. Reapply every 2 to 3 hours or after sweating, drying off with a towel, or swimming. · Always wear a seat belt when traveling in a car. Always wear a helmet when riding a bicycle or motorcycle.

## 2020-07-14 NOTE — PROGRESS NOTES
Medicare Annual Wellness Visit  Name: Hellen Cardenas Date: 2020   MRN: <[id number]> Sex: Male   Age: 80 y.o. Ethnicity: Non-/Non    : 1927 Race: Geraldo Armijo is here for [facility] AWV    Patient does have new concerns today regarding progressive fatigue and weight loss. He is lost approximately 6 pound since her last visit. Reports issues with coughing before eating. He reports he has a coughing fit and then is able to eat but this happens frequently, sometimes once per day. He has not been eating as much due to this issue. He would like to know how much boost he can take per day as he feels that would help. He is not having any dysphasia or done aphasia. No abdominal pain. He previously had issues with blood in his stool but states this is resolved. He saw GI last year and had a colonoscopy for abdominal issues at that time as well. An endoscopy was deferred due to risk. There was a possibility that he may have had an ulcer however. He does report indigestion type symptoms with sensation of reflux. He also has ongoing issues with COPD. He is not taking his inhaler. He states he does not want to pay for the inhalers but he has ongoing issues with dyspnea with exertion. He really does not want to do much to look into his symptoms. He prefers to take a conservative approach. Review of Systems   Constitutional: Positive for fatigue and unexpected weight change. Negative for chills and fever. Respiratory: Positive for cough and shortness of breath. Negative for wheezing. Cardiovascular: Negative for chest pain, palpitations and leg swelling. Gastrointestinal: Negative for abdominal pain, blood in stool, diarrhea, nausea and vomiting. Screenings for behavioral, psychosocial and functional/safety risks, and cognitive dysfunction are all negative except as indicated below.  These results, as well as other patient data from the Health Risk [place]    Moderate mitral regurgitation 2017    with mild Aortic Regurge and diastolic dysfunction    PONV (postoperative nausea and vomiting)     Squamous cell skin cancer     Nose and face    Vitamin D deficiency     Wears glasses        Past Surgical History:   Procedure Laterality Date    CATARACT REMOVAL WITH IMPLANT Bilateral     COLONOSCOPY  2016    MOHS SURGERY      Dr. Flora Moore         Family History   Problem Relation Age of Onset   [facility] Other Mother         old age  80    Other Father         old age   [facility] Cancer Sister    [address] Sclerosis Daughter        CareTeam (Including outside providers/suppliers regularly involved in providing care):   Patient Care Team:  Mendoza Sargent MD as PCP - General (Internal Medicine)  Mendoza Sargent MD as PCP [facility]    Wt Readings from Last 3 Encounters:   20 156 lb (70.8 kg)   20 163 lb (73.9 kg)   20 162 lb 12.8 oz (73.8 kg)     Vitals:    20 0942   BP: (!) 140/73   Pulse: 71   Resp: 16   SpO2: 93%   Weight: 156 lb (70.8 kg)   Height: 5' 8.7\" (1.745 m)     Body mass index is 23.24 kg/m². Based upon direct observation of the patient, evaluation of cognition reveals recent and remote memory intact.     General Appearance: alert and oriented to person, place and time, well developed and well- nourished, in no acute distress  Skin: warm and dry, no rash or erythema  Head: normocephalic and atraumatic  Eyes: pupils equal, round, and reactive to light, extraocular eye movements intact, conjunctivae normal  Pulmonary/Chest: clear to auscultation bilaterally- no wheezes, rales or rhonchi, normal air movement, no respiratory distress  Cardiovascular: normal rate, regular rhythm, normal S1 and S2, no murmurs, rubs, clicks, or gallops, distal pulses intact, no carotid bruits  Abdomen: soft, non-tender, non-distended, normal bowel sounds, no masses or organomegaly  Extremities: no help from others to perform any of the following everyday activities? Eating, dressing, grooming, bathing, toileting, or walking/balance?: None  In the past 7 days, did you need help from others to take care of any of the following? Laundry, housekeeping, banking/finances, shopping, telephone use, food preparation, transportation, or taking medications?: (!) Taking Medications  ADL Interventions:  · Patient declines any further evaluation/treatment for this issue  · states this was an erroneous answer    Personalized Preventive Plan   Current Health Maintenance Status  Immunization History   Administered Date(s) Administered    Influenza Whole 10/26/2015    Influenza, High Dose (Fluzone 65 yrs and older) 09/01/2016, 09/01/2017    Influenza, Sixto Favor, Recombinant, IM PF (Flublok 18 yrs and older) 09/24/2018    Influenza, Triv, inactivated, subunit, adjuvanted, IM (Fluad 65 yrs and older) 01/14/2020    Pneumococcal Conjugate 13-valent ([id number]) 09/01/2016    Pneumococcal Polysaccharide ([id number]) 09/01/2017        Health Maintenance   Topic Date Due    DTaP/Tdap/Td vaccine (1 - Tdap) 05/14/1946    Shingles Vaccine (1 of 2) 05/14/1977    Annual Wellness Visit (AWV)  09/12/2019    Flu vaccine (1) 09/01/2020    TSH testing  01/14/2021    Pneumococcal 65+ years Vaccine  Completed    Hepatitis A vaccine  Aged Out    Hepatitis B vaccine  Aged Out    Hib vaccine  Aged Out    Meningococcal (ACWY) vaccine  Aged Out     Recommendations for [employer] Due: see orders and patient instructions/AVS.  . Recommended screening schedule for the next 5-10 years is provided to the patient in written form: see Patient Mallory Manzanares was seen today for medicare awv. Diagnoses and all orders for this visit:    Routine general medical examination at a health care facility    Weight loss  Discussed this at length including differential which could include malignancy.   He is really not interested in any diagnosis or treatment options other than taking boost.  He will increase this to at least 2-3 times per day. We will check basic labs which she is agreeable to do. He will also try a PPI for suspected GERD. He does not want to do any work-up beyond this which is reasonable given his age.  -     CBC Auto Differential  -     Comprehensive Metabolic Panel  -     TSH with Reflex  -     Vitamin B12  -     GA OFFICE OUTPATIENT VISIT 15 MINUTES    Fatigue, unspecified type  -     CBC Auto Differential  -     Comprehensive Metabolic Panel  -     TSH with Reflex  -     Vitamin B12  -     GA OFFICE OUTPATIENT VISIT 15 MINUTES    Pulmonary emphysema, unspecified emphysema type (HCC)  We discussed this at length. His breathing is certainly interfering with his quality of life and he is encouraged to try the inhaler. I given him some samples of Trelegy to try. He will notify me should he want to continue. Other orders  -     omeprazole (PRILOSEC) 40 MG delayed release capsule; Take 1 capsule by mouth every morning (before breakfast)  -     Discontinue: zoster recombinant adjuvanted vaccine [facility]) 50 MCG/0.5ML SUSR injection; Inject 0.5 mLs into the muscle See Admin Instructions 1 dose now and repeat in 2-6 months  -     zoster recombinant adjuvanted vaccine [facility]) 50 MCG/0.5ML SUSR injection;  Inject 0.5 mLs into the muscle See Admin Instructions 1 dose now and repeat in 2-6 months

## 2020-07-29 ENCOUNTER — OFFICE VISIT (OUTPATIENT)
Dept: PULMONOLOGY | Age: 85
End: 2020-07-29
Payer: MEDICARE

## 2020-07-29 VITALS
BODY MASS INDEX: 21.54 KG/M2 | RESPIRATION RATE: 16 BRPM | TEMPERATURE: 98 F | WEIGHT: 159 LBS | HEIGHT: 72 IN | DIASTOLIC BLOOD PRESSURE: 84 MMHG | SYSTOLIC BLOOD PRESSURE: 122 MMHG | HEART RATE: 70 BPM | OXYGEN SATURATION: 95 %

## 2020-07-29 PROCEDURE — 3023F SPIROM DOC REV: CPT | Performed by: INTERNAL MEDICINE

## 2020-07-29 PROCEDURE — 1036F TOBACCO NON-USER: CPT | Performed by: INTERNAL MEDICINE

## 2020-07-29 PROCEDURE — 99213 OFFICE O/P EST LOW 20 MIN: CPT | Performed by: INTERNAL MEDICINE

## 2020-07-29 PROCEDURE — 4040F PNEUMOC VAC/ADMIN/RCVD: CPT | Performed by: INTERNAL MEDICINE

## 2020-07-29 PROCEDURE — G8420 CALC BMI NORM PARAMETERS: HCPCS | Performed by: INTERNAL MEDICINE

## 2020-07-29 PROCEDURE — G8926 SPIRO NO PERF OR DOC: HCPCS | Performed by: INTERNAL MEDICINE

## 2020-07-29 PROCEDURE — G8427 DOCREV CUR MEDS BY ELIG CLIN: HCPCS | Performed by: INTERNAL MEDICINE

## 2020-07-29 PROCEDURE — 1123F ACP DISCUSS/DSCN MKR DOCD: CPT | Performed by: INTERNAL MEDICINE

## 2020-07-29 RX ORDER — FLUTICASONE FUROATE, UMECLIDINIUM BROMIDE AND VILANTEROL TRIFENATATE 100; 62.5; 25 UG/1; UG/1; UG/1
1 POWDER RESPIRATORY (INHALATION) DAILY
COMMUNITY

## 2020-07-29 NOTE — PROGRESS NOTES
REASON FOR CONSULTATION:  Chief Complaint   Patient presents with    Follow-up     emphysema         Consult at request of Keren Calle MD     PCP: Keren Calle MD    HISTORY OF PRESENT ILLNESS: Adele Howard is a 80y.o. year old male with follow-up for chronic right pleural effusion and centrilobular emphysema. He is a former smoker. Since last visit patient was switched from [facility] to [facility]. He states that his symptoms are the best control they have ever been. He has improved fatigue, improved appetite is even gained a little bit of weight. He has no issues with shortness of breath currently. He does not think that it is due to the inhaler still and would like to trial stopping them and see how his breathing does. Past Medical History:   Diagnosis Date    Arthritis     BPH with obstruction/lower urinary tract symptoms     Dr. Travis Santiago Diverticular disease     Diverticulosis of sigmoid colon with h/o bleeding    Ectropion of right eye     Emphysema of lung (HCC)     PLASCENCIA    Hypercholesteremia     Macular degeneration     Dr. Vanessa Morocho Moderate mitral regurgitation 03/14/2017    with mild Aortic Regurge and diastolic dysfunction    PONV (postoperative nausea and vomiting)     Squamous cell skin cancer     Nose and face    Vitamin D deficiency     Wears glasses        Past Surgical History:   Procedure Laterality Date    CATARACT REMOVAL WITH IMPLANT Bilateral     COLONOSCOPY  06/20/2016    Suzan Farias       family history includes Cancer in his brother and sister; Mult Sclerosis in his daughter; Other in his father and mother. SOCIAL HISTORY:   reports that he has quit smoking. He has never used smokeless tobacco.      ALLERGIES:  Patient is allergic to erythromycin base; fentanyl; penicillins; and versed [midazolam].     REVIEW OF SYSTEMS:  Constitutional: Negative for fever   HENT: Negative for sore throat  Eyes: Negative for Cholecalciferol (VITAMIN D) 2000 UNITS CAPS capsule Take by mouth daily       No current facility-administered medications for this visit. Data Reviewed:   CBC and Renal reviewed  Last CBC  Lab Results   Component Value Date    WBC 10.0 07/14/2020    RBC 4.23 07/14/2020    HGB 13.6 07/14/2020    MCV 96.8 07/14/2020     07/14/2020     Last Renal  Lab Results   Component Value Date     07/14/2020    K 4.5 07/14/2020     07/14/2020    CO2 26 07/14/2020    CO2 27 01/14/2020    CO2 25 08/13/2019    BUN 26 07/14/2020    CREATININE 1.0 07/14/2020    GLUCOSE 93 07/14/2020    CALCIUM 9.1 07/14/2020       Last ABG  POC Blood Gas: No results found for: POCPH, POCPCO2, POCPO2, POCHCO3, NBEA, GAUM3QBI  No results for input(s): PH, PCO2, PO2, HCO3, BE, O2SAT in the last 72 hours. Radiology Review:  Pertinent images / reports were reviewed as a part of this visit. CT Chest images reviewed personally by me, interpretation as follows:  -+centrilobular emphysema. Chronic appearing right sided pleural effusion. CT Chest w/o contrast:   Results for orders placed during the hospital encounter of 08/19/19   CT CHEST WO CONTRAST    Narrative EXAMINATION:  CT OF THE CHEST WITHOUT CONTRAST 8/19/2019 10:28 am    TECHNIQUE:  CT of the chest was performed without the administration of intravenous  contrast. Multiplanar reformatted images are provided for review. Dose  modulation, iterative reconstruction, and/or weight based adjustment of the  mA/kV was utilized to reduce the radiation dose to as low as reasonably  achievable. COMPARISON:  None. HISTORY:  ORDERING SYSTEM PROVIDED HISTORY: Pulmonary emphysema, unspecified emphysema  type ([address]  TECHNOLOGIST PROVIDED HISTORY:  Reason for Exam: pt has sob with exertion.   Acuity: Chronic  Type of Exam: Initial  Additional signs and symptoms: former smoker, also having weight loss  Relevant Medical/Surgical History: HTN, Emphysema, Squamous Cell Carcinoma of  nose and face,    FINDINGS:  Mediastinum: Thyroid gland appears normal.  Scattered small mediastinal nodes  are noted. Aortic valve calcification is seen. Coronary artery  calcification is seen. Small hiatal hernia is seen. There is nonspecific  thickening at the GE junction. Lungs/pleura: Moderate underlying emphysema is seen. Scarring is seen in the  apices. Focal pleural thickening is seen anteriorly in the left hemithorax    Smoothly marginated noncalcified pulmonary nodule is seen in the left upper  lobe measuring 5 mm    Subtle bronchiectatic changes are seen in the lingula. On the right, underlying emphysematous changes are seen. There is a chronic  appearing right-sided pleural effusion, with thickening of the parietal  pleura. There is adjacent consolidation the right lower lobe. Small nodular density is seen in the right middle lobe measuring 4 mm. Upper Abdomen: Adrenal glands appear normal.  There is mild pelvicaliectasis  on the right and left. There is nonspecific injection of the fat at the root  of the mesentery. Soft Tissues/Bones: Ring is seen in the spine      Impression Chronic appearing right-sided pleural effusion with adjacent consolidation of  the right lung base, presumably atelectasis    Underlying emphysema with indeterminate pulmonary nodules    RECOMMENDATIONS:  Fleischner Society guidelines for follow-up and management of incidentally  detected pulmonary nodules:    Multiple Solid Nodules:    Nodule size less than 6 mm  In a low-risk patient, no routine follow-up. In a high-risk patient, optional CT at 12 months. - Low risk patients include individuals with minimal or absent history of  smoking and other known risk factors. - High risk patients include individuals with a history or smoking or known  risk factors. Radiology 2017 http://pubs. rsna.org/doi/full/10.1148/radiol. 1928877159         CTPA: No results found for this or any previous

## 2020-07-29 NOTE — ASSESSMENT & PLAN NOTE
-On Trelegy, symptoms very well controlled. -Recommendation that patient stay on inhaler to reduce risk of exacerbation of the hospitalizations, however, patient does not like pain for inhalers and would like to try to see how he does help move them.  -I told him if his symptoms worsen that he can call us for a Trelegy refill.

## 2020-09-29 ENCOUNTER — HOSPITAL ENCOUNTER (INPATIENT)
Age: 85
LOS: 5 days | Discharge: NURSING, REHAB OR LONG-TERM CARE | DRG: 481 | End: 2020-10-04
Attending: INTERNAL MEDICINE | Admitting: INTERNAL MEDICINE
Payer: MEDICARE

## 2020-09-29 ENCOUNTER — APPOINTMENT (OUTPATIENT)
Dept: CT IMAGING | Age: 85
DRG: 481 | End: 2020-09-29
Payer: MEDICARE

## 2020-09-29 ENCOUNTER — APPOINTMENT (OUTPATIENT)
Dept: GENERAL RADIOLOGY | Age: 85
DRG: 481 | End: 2020-09-29
Payer: MEDICARE

## 2020-09-29 PROBLEM — S72.141A CLOSED 2-PART INTERTROCHANTERIC FRACTURE OF PROXIMAL END OF RIGHT FEMUR, INITIAL ENCOUNTER (HCC): Status: ACTIVE | Noted: 2020-09-29

## 2020-09-29 PROBLEM — S72.142A CLOSED 2-PART INTERTROCHANTERIC FRACTURE OF PROXIMAL END OF LEFT FEMUR (HCC): Status: ACTIVE | Noted: 2020-09-29

## 2020-09-29 LAB
A/G RATIO: 1.1 (ref 1.1–2.2)
ABO/RH: NORMAL
ALBUMIN SERPL-MCNC: 3.8 G/DL (ref 3.4–5)
ALP BLD-CCNC: 116 U/L (ref 40–129)
ALT SERPL-CCNC: 17 U/L (ref 10–40)
ANION GAP SERPL CALCULATED.3IONS-SCNC: 12 MMOL/L (ref 3–16)
ANTIBODY SCREEN: NORMAL
APTT: 30.3 SEC (ref 24.2–36.2)
AST SERPL-CCNC: 28 U/L (ref 15–37)
BASE EXCESS VENOUS: 1.7 MMOL/L
BASOPHILS ABSOLUTE: 0.1 K/UL (ref 0–0.2)
BASOPHILS RELATIVE PERCENT: 0.6 %
BILIRUB SERPL-MCNC: 0.7 MG/DL (ref 0–1)
BILIRUBIN URINE: NEGATIVE
BLOOD, URINE: ABNORMAL
BUN BLDV-MCNC: 28 MG/DL (ref 7–20)
CALCIUM SERPL-MCNC: 9.3 MG/DL (ref 8.3–10.6)
CARBOXYHEMOGLOBIN: 1.4 %
CHLORIDE BLD-SCNC: 101 MMOL/L (ref 99–110)
CLARITY: CLEAR
CO2: 22 MMOL/L (ref 21–32)
COLOR: YELLOW
CREAT SERPL-MCNC: 1.1 MG/DL (ref 0.8–1.3)
EOSINOPHILS ABSOLUTE: 0 K/UL (ref 0–0.6)
EOSINOPHILS RELATIVE PERCENT: 0 %
EPITHELIAL CELLS, UA: 1 /HPF (ref 0–5)
GFR AFRICAN AMERICAN: >60
GFR NON-AFRICAN AMERICAN: >60
GLOBULIN: 3.4 G/DL
GLUCOSE BLD-MCNC: 271 MG/DL (ref 70–99)
GLUCOSE URINE: NEGATIVE MG/DL
HCO3 VENOUS: 27 MMOL/L (ref 23–29)
HCT VFR BLD CALC: 38.1 % (ref 40.5–52.5)
HEMOGLOBIN: 12.9 G/DL (ref 13.5–17.5)
HYALINE CASTS: 1 /LPF (ref 0–8)
INR BLD: 1.12 (ref 0.86–1.14)
KETONES, URINE: ABNORMAL MG/DL
LACTIC ACID: 1.8 MMOL/L (ref 0.4–2)
LEUKOCYTE ESTERASE, URINE: NEGATIVE
LYMPHOCYTES ABSOLUTE: 0.4 K/UL (ref 1–5.1)
LYMPHOCYTES RELATIVE PERCENT: 2.9 %
MCH RBC QN AUTO: 32.1 PG (ref 26–34)
MCHC RBC AUTO-ENTMCNC: 33.8 G/DL (ref 31–36)
MCV RBC AUTO: 94.9 FL (ref 80–100)
METHEMOGLOBIN VENOUS: 0.4 %
MICROSCOPIC EXAMINATION: YES
MONOCYTES ABSOLUTE: 1.2 K/UL (ref 0–1.3)
MONOCYTES RELATIVE PERCENT: 7.9 %
NEUTROPHILS ABSOLUTE: 13.1 K/UL (ref 1.7–7.7)
NEUTROPHILS RELATIVE PERCENT: 88.6 %
NITRITE, URINE: NEGATIVE
O2 CONTENT, VEN: 8 ML/DL
O2 SAT, VEN: 43 %
O2 THERAPY: NORMAL
PCO2, VEN: 44.2 MMHG (ref 40–50)
PDW BLD-RTO: 13.7 % (ref 12.4–15.4)
PH UA: 5.5 (ref 5–8)
PH VENOUS: 7.39 (ref 7.35–7.45)
PLATELET # BLD: 203 K/UL (ref 135–450)
PMV BLD AUTO: 9.2 FL (ref 5–10.5)
PO2, VEN: 24 MMHG
POTASSIUM REFLEX MAGNESIUM: 4.9 MMOL/L (ref 3.5–5.1)
PRO-BNP: 359 PG/ML (ref 0–449)
PROTEIN UA: NEGATIVE MG/DL
PROTHROMBIN TIME: 13 SEC (ref 10–13.2)
RBC # BLD: 4.02 M/UL (ref 4.2–5.9)
RBC UA: 11 /HPF (ref 0–4)
SARS-COV-2, NAAT: NOT DETECTED
SODIUM BLD-SCNC: 135 MMOL/L (ref 136–145)
SPECIFIC GRAVITY UA: 1.02 (ref 1–1.03)
TCO2 CALC VENOUS: 28 MMOL/L
TOTAL CK: 77 U/L (ref 39–308)
TOTAL PROTEIN: 7.2 G/DL (ref 6.4–8.2)
TROPONIN: <0.01 NG/ML
URINE REFLEX TO CULTURE: ABNORMAL
URINE TYPE: ABNORMAL
UROBILINOGEN, URINE: 0.2 E.U./DL
WBC # BLD: 14.8 K/UL (ref 4–11)
WBC UA: 1 /HPF (ref 0–5)

## 2020-09-29 PROCEDURE — 85025 COMPLETE CBC W/AUTO DIFF WBC: CPT

## 2020-09-29 PROCEDURE — 72125 CT NECK SPINE W/O DYE: CPT

## 2020-09-29 PROCEDURE — 84484 ASSAY OF TROPONIN QUANT: CPT

## 2020-09-29 PROCEDURE — 86901 BLOOD TYPING SEROLOGIC RH(D): CPT

## 2020-09-29 PROCEDURE — 6360000002 HC RX W HCPCS: Performed by: PHYSICIAN ASSISTANT

## 2020-09-29 PROCEDURE — 83605 ASSAY OF LACTIC ACID: CPT

## 2020-09-29 PROCEDURE — 83880 ASSAY OF NATRIURETIC PEPTIDE: CPT

## 2020-09-29 PROCEDURE — 93005 ELECTROCARDIOGRAM TRACING: CPT | Performed by: PHYSICIAN ASSISTANT

## 2020-09-29 PROCEDURE — 1200000000 HC SEMI PRIVATE

## 2020-09-29 PROCEDURE — U0002 COVID-19 LAB TEST NON-CDC: HCPCS

## 2020-09-29 PROCEDURE — 85730 THROMBOPLASTIN TIME PARTIAL: CPT

## 2020-09-29 PROCEDURE — 2580000003 HC RX 258: Performed by: PHYSICIAN ASSISTANT

## 2020-09-29 PROCEDURE — 82803 BLOOD GASES ANY COMBINATION: CPT

## 2020-09-29 PROCEDURE — 85610 PROTHROMBIN TIME: CPT

## 2020-09-29 PROCEDURE — 81001 URINALYSIS AUTO W/SCOPE: CPT

## 2020-09-29 PROCEDURE — 73552 X-RAY EXAM OF FEMUR 2/>: CPT

## 2020-09-29 PROCEDURE — 82550 ASSAY OF CK (CPK): CPT

## 2020-09-29 PROCEDURE — 94664 DEMO&/EVAL PT USE INHALER: CPT

## 2020-09-29 PROCEDURE — 71250 CT THORAX DX C-: CPT

## 2020-09-29 PROCEDURE — 6370000000 HC RX 637 (ALT 250 FOR IP): Performed by: PHYSICIAN ASSISTANT

## 2020-09-29 PROCEDURE — 73502 X-RAY EXAM HIP UNI 2-3 VIEWS: CPT

## 2020-09-29 PROCEDURE — 86900 BLOOD TYPING SEROLOGIC ABO: CPT

## 2020-09-29 PROCEDURE — 96374 THER/PROPH/DIAG INJ IV PUSH: CPT

## 2020-09-29 PROCEDURE — 71045 X-RAY EXAM CHEST 1 VIEW: CPT

## 2020-09-29 PROCEDURE — 36415 COLL VENOUS BLD VENIPUNCTURE: CPT

## 2020-09-29 PROCEDURE — 99285 EMERGENCY DEPT VISIT HI MDM: CPT

## 2020-09-29 PROCEDURE — 70450 CT HEAD/BRAIN W/O DYE: CPT

## 2020-09-29 PROCEDURE — 86850 RBC ANTIBODY SCREEN: CPT

## 2020-09-29 PROCEDURE — 94640 AIRWAY INHALATION TREATMENT: CPT

## 2020-09-29 PROCEDURE — 80053 COMPREHEN METABOLIC PANEL: CPT

## 2020-09-29 RX ORDER — MAGNESIUM SULFATE IN WATER 40 MG/ML
2 INJECTION, SOLUTION INTRAVENOUS PRN
Status: DISCONTINUED | OUTPATIENT
Start: 2020-09-29 | End: 2020-10-04 | Stop reason: HOSPADM

## 2020-09-29 RX ORDER — LEVOTHYROXINE SODIUM 0.05 MG/1
50 TABLET ORAL DAILY
Status: DISCONTINUED | OUTPATIENT
Start: 2020-09-30 | End: 2020-10-04 | Stop reason: HOSPADM

## 2020-09-29 RX ORDER — SODIUM CHLORIDE 0.9 % (FLUSH) 0.9 %
10 SYRINGE (ML) INJECTION PRN
Status: DISCONTINUED | OUTPATIENT
Start: 2020-09-29 | End: 2020-10-04 | Stop reason: HOSPADM

## 2020-09-29 RX ORDER — TAMSULOSIN HYDROCHLORIDE 0.4 MG/1
0.4 CAPSULE ORAL DAILY
Status: DISCONTINUED | OUTPATIENT
Start: 2020-09-30 | End: 2020-10-04 | Stop reason: HOSPADM

## 2020-09-29 RX ORDER — MORPHINE SULFATE 4 MG/ML
4 INJECTION, SOLUTION INTRAMUSCULAR; INTRAVENOUS ONCE
Status: COMPLETED | OUTPATIENT
Start: 2020-09-29 | End: 2020-09-29

## 2020-09-29 RX ORDER — SODIUM CHLORIDE 0.9 % (FLUSH) 0.9 %
10 SYRINGE (ML) INJECTION EVERY 12 HOURS SCHEDULED
Status: DISCONTINUED | OUTPATIENT
Start: 2020-09-30 | End: 2020-10-04 | Stop reason: HOSPADM

## 2020-09-29 RX ORDER — IPRATROPIUM BROMIDE AND ALBUTEROL SULFATE 2.5; .5 MG/3ML; MG/3ML
1 SOLUTION RESPIRATORY (INHALATION) ONCE
Status: COMPLETED | OUTPATIENT
Start: 2020-09-29 | End: 2020-09-29

## 2020-09-29 RX ORDER — POTASSIUM CHLORIDE 7.45 MG/ML
10 INJECTION INTRAVENOUS PRN
Status: DISCONTINUED | OUTPATIENT
Start: 2020-09-29 | End: 2020-10-04 | Stop reason: HOSPADM

## 2020-09-29 RX ORDER — ONDANSETRON 2 MG/ML
4 INJECTION INTRAMUSCULAR; INTRAVENOUS EVERY 6 HOURS PRN
Status: DISCONTINUED | OUTPATIENT
Start: 2020-09-29 | End: 2020-10-04 | Stop reason: HOSPADM

## 2020-09-29 RX ORDER — 0.9 % SODIUM CHLORIDE 0.9 %
500 INTRAVENOUS SOLUTION INTRAVENOUS ONCE
Status: COMPLETED | OUTPATIENT
Start: 2020-09-29 | End: 2020-09-29

## 2020-09-29 RX ORDER — PROMETHAZINE HYDROCHLORIDE 25 MG/1
12.5 TABLET ORAL EVERY 6 HOURS PRN
Status: DISCONTINUED | OUTPATIENT
Start: 2020-09-29 | End: 2020-10-04 | Stop reason: HOSPADM

## 2020-09-29 RX ORDER — PANTOPRAZOLE SODIUM 40 MG/1
40 TABLET, DELAYED RELEASE ORAL
Status: DISCONTINUED | OUTPATIENT
Start: 2020-09-30 | End: 2020-10-04 | Stop reason: HOSPADM

## 2020-09-29 RX ORDER — MORPHINE SULFATE 2 MG/ML
2 INJECTION, SOLUTION INTRAMUSCULAR; INTRAVENOUS
Status: DISCONTINUED | OUTPATIENT
Start: 2020-09-29 | End: 2020-10-01

## 2020-09-29 RX ORDER — SODIUM CHLORIDE, SODIUM LACTATE, POTASSIUM CHLORIDE, CALCIUM CHLORIDE 600; 310; 30; 20 MG/100ML; MG/100ML; MG/100ML; MG/100ML
INJECTION, SOLUTION INTRAVENOUS CONTINUOUS
Status: DISCONTINUED | OUTPATIENT
Start: 2020-09-30 | End: 2020-10-04 | Stop reason: HOSPADM

## 2020-09-29 RX ADMIN — IPRATROPIUM BROMIDE AND ALBUTEROL SULFATE 1 AMPULE: .5; 3 SOLUTION RESPIRATORY (INHALATION) at 20:44

## 2020-09-29 RX ADMIN — MORPHINE SULFATE 4 MG: 4 INJECTION, SOLUTION INTRAMUSCULAR; INTRAVENOUS at 20:36

## 2020-09-29 RX ADMIN — SODIUM CHLORIDE 500 ML: 9 INJECTION, SOLUTION INTRAVENOUS at 20:36

## 2020-09-29 ASSESSMENT — PAIN DESCRIPTION - LOCATION
LOCATION: LEG
LOCATION: LEG

## 2020-09-29 ASSESSMENT — PAIN DESCRIPTION - ONSET: ONSET: SUDDEN

## 2020-09-29 ASSESSMENT — PAIN DESCRIPTION - FREQUENCY
FREQUENCY: INTERMITTENT
FREQUENCY: INTERMITTENT

## 2020-09-29 ASSESSMENT — PAIN SCALES - GENERAL
PAINLEVEL_OUTOF10: 10
PAINLEVEL_OUTOF10: 7
PAINLEVEL_OUTOF10: 3

## 2020-09-29 ASSESSMENT — PAIN - FUNCTIONAL ASSESSMENT
PAIN_FUNCTIONAL_ASSESSMENT: PREVENTS OR INTERFERES WITH ALL ACTIVE AND SOME PASSIVE ACTIVITIES
PAIN_FUNCTIONAL_ASSESSMENT: PREVENTS OR INTERFERES WITH ALL ACTIVE AND SOME PASSIVE ACTIVITIES

## 2020-09-29 ASSESSMENT — PAIN DESCRIPTION - PROGRESSION
CLINICAL_PROGRESSION: NOT CHANGED
CLINICAL_PROGRESSION: NOT CHANGED

## 2020-09-29 ASSESSMENT — PAIN DESCRIPTION - ORIENTATION
ORIENTATION: LEFT
ORIENTATION: LEFT

## 2020-09-29 ASSESSMENT — PAIN DESCRIPTION - PAIN TYPE
TYPE: ACUTE PAIN
TYPE: ACUTE PAIN

## 2020-09-29 ASSESSMENT — PAIN DESCRIPTION - DESCRIPTORS: DESCRIPTORS: SHARP;SHOOTING;DISCOMFORT

## 2020-09-29 NOTE — ED NOTES
Bed: [facility]  Expected date:   Expected time:   Means of arrival:   Comments:  Precious 1898 89yo fall     Parvin Jarquin RN  09/29/20 0629

## 2020-09-29 NOTE — ED PROVIDER NOTES
[address] [facility]        Pt Name: Osmany Shi  MRN: [id number]  [place] 5/14/1927  Date of evaluation: 9/29/2020  Provider: Maria Fernanda Calzada PA-C  PCP: Sara Uribe MD    LUCIA. I have evaluated this patient. My supervising physician was available for consultation. Anderson Hughes MD      CHIEF COMPLAINT       Chief Complaint   Patient presents with    Fall     Mechanical fall, Left femur pain. Small abrasion on left side of scalp. Patient is A/O x4. Patient was 84% on RA, does not wear O2 at home. Received 30 mg Ketamine en route.  Leg Injury       HISTORY OF PRESENT ILLNESS   (Location, Timing/Onset, Context/Setting, Quality, Duration, Modifying Factors, Severity, Associated Signs and Symptoms)  Note limiting factors. Osmany Shi is a 80 y.o. male patient presenting by EMS. Patient found on floor in the kitchen. Patient states noon today went to get some soup out of the pantry. He turned lost balance eyes occasionally happens. States he had about 3-4 steps and then fell to the ground. He indicates pain left hip and proximal femur area. Also states hit his head on the vinyl floor of the kitchen, left temporal area. No LOC or neck pain. No headache. Patient has been brought in by EMS for evaluation of pain left hip. Patient also has known COPD/emphysema and longtime smoker. Patient states that he monitors his oximetry at home. He states room air 90% usually. On arrival the patient has oximetry at 84%. Placed on oxygen 4 L. I decreased patient to 2 L while I am in the room for least 10 to 15 minutes and he is saturating at 93%. He indicates no other orthopedic pain complaints at this time. Will obtain imaging at this time. The patient with medical history of moderate mitral regurgitation, BPH, COPD/emphysema, hyperlipidemia, macular degeneration, vitamin D deficiency and wears glasses.     At this time he does not indicate any chest pain or shortness of breath beyond his baseline shortness of breath. No gastrointestinal or urinary complaints. Nursing Notes were all reviewed and agreed with or any disagreements were addressed in the HPI. REVIEW OF SYSTEMS    (2-9 systems for level 4, 10 or more for level 5)     Review of Systems    Positives and Pertinent negatives as per HPI. Except as noted above in the ROS, all other systems were reviewed and negative.        PAST MEDICAL HISTORY     Past Medical History:   Diagnosis Date    Arthritis     BPH with obstruction/lower urinary tract symptoms     Dr. Saadia Guajardo Diverticular disease     Diverticulosis of sigmoid colon with h/o bleeding    Ectropion of right eye     Emphysema of lung ([address]     PLASCENCIA    Hypercholesteremia     Macular degeneration     Dr. Mabel Feldman Moderate mitral regurgitation 03/14/2017    with mild Aortic Regurge and diastolic dysfunction    PONV (postoperative nausea and vomiting)     Squamous cell skin cancer     Nose and face    Vitamin D deficiency     Wears glasses          SURGICAL HISTORY     Past Surgical History:   Procedure Laterality Date    CATARACT REMOVAL WITH IMPLANT Bilateral     COLONOSCOPY  06/20/2016    [facility]      Dr. Andre Nguyen       Previous Medications    BISACODYL (LAXATIVE PO)    Take by mouth    CHOLECALCIFEROL (VITAMIN D) 2000 UNITS CAPS CAPSULE    Take by mouth daily    FINASTERIDE (PROSCAR) 5 MG TABLET    TAKE ONE TABLET BY MOUTH DAILY    FLUTICASONE-UMECLIDIN-VILANT (TRELEGY ELLIPTA) 100-62.5-25 MCG/INH AEPB    Inhale 1 puff into the lungs daily    HANDICAP PLACARD MISC    by Does not apply route Duration 5 years    LEVOTHYROXINE (SYNTHROID) 50 MCG TABLET    TAKE ONE TABLET BY MOUTH DAILY    OMEPRAZOLE (PRILOSEC) 40 MG DELAYED RELEASE CAPSULE    Take 1 capsule by mouth every morning (before breakfast)    TAMSULOSIN (FLOMAX) 0.4 MG CAPSULE    TAKE ONE CAPSULE BY MOUTH DAILY         ALLERGIES     Erythromycin base; Fentanyl; Penicillins; and Versed [midazolam]    FAMILYHISTORY       Family History   Problem Relation Age of Onset   Jennie Kitchen Other Mother         old age  80   Jennie Kitchen Other Father         old age   Jennie Kitchen Cancer Sister     Cancer Brother     Mult Sclerosis Daughter           SOCIAL HISTORY       Social History     Tobacco Use    Smoking status: Former Smoker    Smokeless tobacco: Never Used    Tobacco comment: quit 50 years ago   Substance Use Topics    Alcohol use: Yes     Alcohol/week: 0.0 standard drinks     Comment: infrequently    Drug use: No       SCREENINGS             PHYSICAL EXAM    (up to 7 for level 4, 8 or more for level 5)     ED Triage Vitals [20 1909]   BP Temp Temp Source Pulse Resp SpO2 Height Weight   (!) 155/94 98.4 °F (36.9 °C) Oral 109 22 (!) 84 % 5' 11\" (1.803 m) 158 lb 8.2 oz (71.9 kg)       Physical Exam  Vitals signs and nursing note reviewed. Constitutional:       Appearance: Normal appearance. He is well-developed and normal weight. HENT:      Head: Normocephalic and atraumatic. Right Ear: External ear normal.      Left Ear: External ear normal.   Eyes:      General: No scleral icterus. Right eye: No discharge. Left eye: No discharge. Conjunctiva/sclera: Conjunctivae normal.   Neck:      Musculoskeletal: Normal range of motion and neck supple. Cardiovascular:      Rate and Rhythm: Regular rhythm. Tachycardia present. Pulses: Normal pulses. Heart sounds: Normal heart sounds. Pulmonary:      Effort: Pulmonary effort is normal.      Breath sounds: Normal breath sounds. Musculoskeletal:         General: Tenderness present. Comments: Patient with pain left hip proximal femur area. I did not attempt range of motion. Leg slightly shorter compared to right. He does have a strong DP and PT pulse. He does have sensation to the foot. Skin:     General: Skin is warm and dry. Neurological:      General: No focal deficit present. Mental Status: He is alert and oriented to person, place, and time. Mental status is at baseline. Psychiatric:         Mood and Affect: Mood normal.         Behavior: Behavior normal.         Thought Content:  Thought content normal.         Judgment: Judgment normal.         DIAGNOSTIC RESULTS   LABS:    Labs Reviewed   CBC WITH AUTO DIFFERENTIAL - Abnormal; Notable for the following components:       Result Value    WBC 14.8 (*)     RBC 4.02 (*)     Hemoglobin 12.9 (*)     Hematocrit 38.1 (*)     Neutrophils Absolute 13.1 (*)     Lymphocytes Absolute 0.4 (*)     All other components within normal limits    Narrative:     Performed at:  [facility]  [address] [facility] [place] [address]   Phone [phone]   COMPREHENSIVE METABOLIC PANEL W/ REFLEX TO MG FOR LOW K - Abnormal; Notable for the following components:    Sodium 135 (*)     Glucose 271 (*)     BUN 28 (*)     All other components within normal limits    Narrative:     Performed at:  [facility]  [address] [facility] [place] [address]   Phone [phone]   TROPONIN    Narrative:     Performed at:  [facility]  [address] [facility] [place] [address]   Phone [phone]   BRAIN NATRIURETIC PEPTIDE    Narrative:     Performed at:  [facility]  [address] [facility] [place] [address]   Phone [phone]   PROTIME-INR    Narrative:     Performed at:  [facility]  [address] [facility] [place] [address]   Phone [phone]   APTT    Narrative:     Performed at:  [facility]  [address] [facility] [place] [address]   Phone [phone]   LACTIC ACID, PLASMA    Narrative:     Performed at:  [facility]  [address] [facility] [place] [address] Phone [phone]   URINE RT REFLEX TO CULTURE    Narrative:     Performed at:  [facility]  [address] [facility] [place] [address]   Phone [phone]   BLOOD GAS, VENOUS    Narrative:     Performed at:  [facility]  [address] [place] [address]   Phone [phone]   CK    Narrative:     Performed at:  [facility]  [address] [facility] [place] [address]   Phone [phone]    Narrative:     Performed at:  [facility]  [address] [facility] [place] [address]   Phone [phone]   TYPE AND SCREEN    Narrative:     Performed at:  [facility]  [address] [facility] [place] [address]   Phone [phone]       All other labs were within normal range or not returned as of this dictation. EKG: All EKG's are interpreted by the Emergency Department Physician in the absence of a cardiologist.  Please see their note for interpretation of EKG. RADIOLOGY:   Non-plain film images such as CT, Ultrasound and MRI are read by the radiologist. Plain radiographic images are visualized and preliminarily interpreted by the ED Provider with the below findings:        Interpretation per the Radiologist below, if available at the time of this note:    CT CHEST WO CONTRAST   Final Result   1. No evidence of pneumonia   2. Chronic right pleural effusion with pleural thickening and adjacent right   lower lobe atelectasis   3. Emphysema, chronic interstitial changes, stable left pulmonary nodules and   pleural plaques   4. Right juxta cardiac adenopathy concerning for metastatic disease   5. New hepatic metastatic disease   6. Hiatal hernia with gastroesophageal reflux         CT Cervical Spine WO Contrast   Final Result   No acute abnormality of the cervical spine.          CT Head WO Contrast   Final Result   No acute intracranial abnormality. XR FEMUR LEFT (MIN 2 VIEWS)   Final Result   Acute fracture of the left hip. XR HIP 2-3 VW W PELVIS LEFT   Final Result   Acute fracture of the left hip. XR CHEST PORTABLE   Final Result   Airspace disease in the right lower lung, most compatible with pneumonia,   with a small parapneumonic effusion. There is sharp margination of the right heart border, which is new when   compared to the previous exam, raising the possibility of lobar collapse. Consider further evaluation with chest CT. No results found. PROCEDURES   Unless otherwise noted below, none     Procedures    CRITICAL CARE TIME   N/A    CONSULTS:  None      EMERGENCY DEPARTMENT COURSE and DIFFERENTIAL DIAGNOSIS/MDM:   Vitals:    Vitals:    09/29/20 2200 09/29/20 2215 09/29/20 2230 09/29/20 2300   BP: (!) 162/78 (!) 152/67 (!) 155/60 (!) 151/64   Pulse: 102 104 95 99   Resp: 20 18 15 15   Temp:    98.4 °F (36.9 °C)   TempSrc:       SpO2: 96% 97% 99% 97%   Weight:       Height:           Patient was given the following medications:  Medications   0.9 % sodium chloride bolus (0 mLs Intravenous Stopped 9/29/20 2150)   morphine (PF) injection 4 mg (4 mg Intravenous Given 9/29/20 2036)   ipratropium-albuterol (DUONEB) nebulizer solution 1 ampule (1 ampule Inhalation Given 9/29/20 2044)         The patient presenting with history of fall occurring noon today. Sustained a left hip comminuted intertrochanteric fracture with a valgus deformity. I did send perfect serve message to Dr. Lolly Macias he agrees with admission the patient will need surgical intervention. Chest x-ray show concern for pneumonia recommending CT scan. I did obtain CT scan of the chest showing no evidence pneumonia does confirm COPD/emphysema as well as mediastinal nodule as well as what appears to be new lesions in the liver to suggest hepatic metastatic disease of unknown origin.     The patient blood sugar is 271, WBC 14.8.  The patient without previous diagnosis of diabetes. Requesting admission for postop clearance as the patient will need correction of the left intertrochanteric fracture tomorrow by Dr. Paula Mckeon sent to hospitalist 10:17 PM.    The patient is DNR-CCA    FINAL IMPRESSION      1. Closed nondisplaced intertrochanteric fracture of left femur, initial encounter ([facility]    2. Hepatic metastases ([facility]    3. Mediastinal adenopathy    4. Pulmonary emphysema, unspecified emphysema type [facility])          DISPOSITION/PLAN   DISPOSITION Admitted 09/29/2020 10:57:10 PM      PATIENT REFERREDTO:  No follow-up provider specified. DISCHARGE MEDICATIONS:  New Prescriptions    No medications on file       DISCONTINUED MEDICATIONS:  Discontinued Medications    No medications on file              (Please note that portions of this note were completed with a voice recognition program.  Efforts were made to edit the dictations but occasionally words are mis-transcribed. )    Jenny Calvin PA-C (electronically signed)           Jenny Calvin PA-C  09/29/20 [address] [place], [place] [address]JOYCE  09/30/20 [id number]

## 2020-09-29 NOTE — LETTER
2020    [address] [place] [address] Ortho & Spine  Consult Billing Form:      DEMOGRAPHICS:                                                                                                              .    Patient Name:  Mika Ferrari  Patient :  1927   Patient SS#:  [id number]    Patient Phone:  [phone] (home)  Alt. Patient Phone:    [facility] [address] [facility] [address] [address]    PCP:  Sherri Kendrick MD  Insurance:  Payor: Gianna Cox / Plan: MEDICARE PART A AND B / Product Type: *No Product type* /   Insurance ID Number:    DIAGNOSIS & PROCEDURE:                                                                                            .    Diagnosis:   Left hip fx    Hospital:  [facility]    Provider:  Kuldeep WISE    SCHEDULING INFORMATION:                                                                                         .     Date of Consultation:                              Kuldeep WISE  20     BILLING INFORMATION:                                                                                                    .    Procedure:       CPT Code Modifier

## 2020-09-30 ENCOUNTER — TELEPHONE (OUTPATIENT)
Dept: ORTHOPEDIC SURGERY | Age: 85
End: 2020-09-30

## 2020-09-30 ENCOUNTER — ANESTHESIA EVENT (OUTPATIENT)
Dept: OPERATING ROOM | Age: 85
DRG: 481 | End: 2020-09-30
Payer: MEDICARE

## 2020-09-30 ENCOUNTER — ANESTHESIA (OUTPATIENT)
Dept: OPERATING ROOM | Age: 85
DRG: 481 | End: 2020-09-30
Payer: MEDICARE

## 2020-09-30 ENCOUNTER — APPOINTMENT (OUTPATIENT)
Dept: GENERAL RADIOLOGY | Age: 85
DRG: 481 | End: 2020-09-30
Payer: MEDICARE

## 2020-09-30 VITALS
SYSTOLIC BLOOD PRESSURE: 91 MMHG | TEMPERATURE: 96.8 F | OXYGEN SATURATION: 99 % | RESPIRATION RATE: 1 BRPM | DIASTOLIC BLOOD PRESSURE: 54 MMHG

## 2020-09-30 PROBLEM — S72.002A FRACTURE OF FEMORAL NECK, LEFT, CLOSED (HCC): Status: ACTIVE | Noted: 2020-09-30

## 2020-09-30 PROBLEM — S72.102A CLOSED PERTROCHANTERIC FRACTURE OF FEMUR, LEFT, INITIAL ENCOUNTER (HCC): Status: ACTIVE | Noted: 2020-09-29

## 2020-09-30 PROBLEM — M80.00XA AGE-RELATED OSTEOPOROSIS WITH CURRENT PATHOLOGICAL FRACTURE: Status: ACTIVE | Noted: 2020-09-30

## 2020-09-30 PROBLEM — S72.145A CLOSED NONDISPLACED INTERTROCHANTERIC FRACTURE OF LEFT FEMUR (HCC): Status: ACTIVE | Noted: 2020-09-29

## 2020-09-30 LAB
EKG ATRIAL RATE: 101 BPM
EKG DIAGNOSIS: NORMAL
EKG P AXIS: 66 DEGREES
EKG P-R INTERVAL: 216 MS
EKG Q-T INTERVAL: 340 MS
EKG QRS DURATION: 82 MS
EKG QTC CALCULATION (BAZETT): 440 MS
EKG R AXIS: -4 DEGREES
EKG T AXIS: 70 DEGREES
EKG VENTRICULAR RATE: 101 BPM

## 2020-09-30 PROCEDURE — 6360000002 HC RX W HCPCS: Performed by: INTERNAL MEDICINE

## 2020-09-30 PROCEDURE — 6360000002 HC RX W HCPCS: Performed by: ORTHOPAEDIC SURGERY

## 2020-09-30 PROCEDURE — 27245 TREAT THIGH FRACTURE: CPT | Performed by: ORTHOPAEDIC SURGERY

## 2020-09-30 PROCEDURE — 93010 ELECTROCARDIOGRAM REPORT: CPT | Performed by: INTERNAL MEDICINE

## 2020-09-30 PROCEDURE — 6370000000 HC RX 637 (ALT 250 FOR IP): Performed by: INTERNAL MEDICINE

## 2020-09-30 PROCEDURE — 2700000000 HC OXYGEN THERAPY PER DAY

## 2020-09-30 PROCEDURE — 2720000010 HC SURG SUPPLY STERILE: Performed by: ORTHOPAEDIC SURGERY

## 2020-09-30 PROCEDURE — 2580000003 HC RX 258: Performed by: NURSE ANESTHETIST, CERTIFIED REGISTERED

## 2020-09-30 PROCEDURE — 2580000003 HC RX 258: Performed by: INTERNAL MEDICINE

## 2020-09-30 PROCEDURE — 94760 N-INVAS EAR/PLS OXIMETRY 1: CPT

## 2020-09-30 PROCEDURE — 99222 1ST HOSP IP/OBS MODERATE 55: CPT | Performed by: ORTHOPAEDIC SURGERY

## 2020-09-30 PROCEDURE — 3700000001 HC ADD 15 MINUTES (ANESTHESIA): Performed by: ORTHOPAEDIC SURGERY

## 2020-09-30 PROCEDURE — 2500000003 HC RX 250 WO HCPCS: Performed by: ORTHOPAEDIC SURGERY

## 2020-09-30 PROCEDURE — 2500000003 HC RX 250 WO HCPCS: Performed by: NURSE ANESTHETIST, CERTIFIED REGISTERED

## 2020-09-30 PROCEDURE — 2580000003 HC RX 258: Performed by: ANESTHESIOLOGY

## 2020-09-30 PROCEDURE — 7100000001 HC PACU RECOVERY - ADDTL 15 MIN: Performed by: ORTHOPAEDIC SURGERY

## 2020-09-30 PROCEDURE — 3600000014 HC SURGERY LEVEL 4 ADDTL 15MIN: Performed by: ORTHOPAEDIC SURGERY

## 2020-09-30 PROCEDURE — 6360000002 HC RX W HCPCS: Performed by: NURSE ANESTHETIST, CERTIFIED REGISTERED

## 2020-09-30 PROCEDURE — 3209999900 FLUORO FOR SURGICAL PROCEDURES

## 2020-09-30 PROCEDURE — 2580000003 HC RX 258: Performed by: ORTHOPAEDIC SURGERY

## 2020-09-30 PROCEDURE — 1200000000 HC SEMI PRIVATE

## 2020-09-30 PROCEDURE — C1769 GUIDE WIRE: HCPCS | Performed by: ORTHOPAEDIC SURGERY

## 2020-09-30 PROCEDURE — C1713 ANCHOR/SCREW BN/BN,TIS/BN: HCPCS | Performed by: ORTHOPAEDIC SURGERY

## 2020-09-30 PROCEDURE — 94640 AIRWAY INHALATION TREATMENT: CPT

## 2020-09-30 PROCEDURE — 0QS734Z REPOSITION LEFT UPPER FEMUR WITH INTERNAL FIXATION DEVICE, PERCUTANEOUS APPROACH: ICD-10-PCS | Performed by: ORTHOPAEDIC SURGERY

## 2020-09-30 PROCEDURE — 3600000004 HC SURGERY LEVEL 4 BASE: Performed by: ORTHOPAEDIC SURGERY

## 2020-09-30 PROCEDURE — 73502 X-RAY EXAM HIP UNI 2-3 VIEWS: CPT

## 2020-09-30 PROCEDURE — 7100000000 HC PACU RECOVERY - FIRST 15 MIN: Performed by: ORTHOPAEDIC SURGERY

## 2020-09-30 PROCEDURE — 36415 COLL VENOUS BLD VENIPUNCTURE: CPT

## 2020-09-30 PROCEDURE — 2709999900 HC NON-CHARGEABLE SUPPLY: Performed by: ORTHOPAEDIC SURGERY

## 2020-09-30 PROCEDURE — 2780000010 HC IMPLANT OTHER: Performed by: ORTHOPAEDIC SURGERY

## 2020-09-30 PROCEDURE — 3700000000 HC ANESTHESIA ATTENDED CARE: Performed by: ORTHOPAEDIC SURGERY

## 2020-09-30 DEVICE — IMPLANTABLE DEVICE: Type: IMPLANTABLE DEVICE | Site: HIP | Status: FUNCTIONAL

## 2020-09-30 DEVICE — NAIL IM L235MM DIA10MM 130DEG SHT L PROX FEM GRN TI CANN: Type: IMPLANTABLE DEVICE | Site: HIP | Status: FUNCTIONAL

## 2020-09-30 DEVICE — SCREW BNE L40MM DIA5MM ST TIB LT GRN TI ST CANN LOK FULL: Type: IMPLANTABLE DEVICE | Site: HIP | Status: FUNCTIONAL

## 2020-09-30 DEVICE — WASHER ORTH DIA13MM FOR L TI SCR: Type: IMPLANTABLE DEVICE | Site: HIP | Status: FUNCTIONAL

## 2020-09-30 RX ORDER — KETAMINE HCL IN NACL, ISO-OSM 100MG/10ML
SYRINGE (ML) INJECTION PRN
Status: DISCONTINUED | OUTPATIENT
Start: 2020-09-30 | End: 2020-09-30 | Stop reason: SDUPTHER

## 2020-09-30 RX ORDER — PHENYLEPHRINE HCL IN 0.9% NACL 1 MG/10 ML
SYRINGE (ML) INTRAVENOUS PRN
Status: DISCONTINUED | OUTPATIENT
Start: 2020-09-30 | End: 2020-09-30 | Stop reason: SDUPTHER

## 2020-09-30 RX ORDER — SENNA PLUS 8.6 MG/1
1 TABLET ORAL DAILY
Status: ON HOLD | COMMUNITY
End: 2020-10-03 | Stop reason: HOSPADM

## 2020-09-30 RX ORDER — ONDANSETRON 2 MG/ML
4 INJECTION INTRAMUSCULAR; INTRAVENOUS
Status: DISCONTINUED | OUTPATIENT
Start: 2020-09-30 | End: 2020-09-30 | Stop reason: HOSPADM

## 2020-09-30 RX ORDER — OXYCODONE HYDROCHLORIDE AND ACETAMINOPHEN 5; 325 MG/1; MG/1
2 TABLET ORAL PRN
Status: DISCONTINUED | OUTPATIENT
Start: 2020-09-30 | End: 2020-09-30 | Stop reason: HOSPADM

## 2020-09-30 RX ORDER — FENTANYL CITRATE 50 UG/ML
25 INJECTION, SOLUTION INTRAMUSCULAR; INTRAVENOUS EVERY 5 MIN PRN
Status: DISCONTINUED | OUTPATIENT
Start: 2020-09-30 | End: 2020-09-30 | Stop reason: HOSPADM

## 2020-09-30 RX ORDER — BUPIVACAINE HYDROCHLORIDE 2.5 MG/ML
INJECTION, SOLUTION EPIDURAL; INFILTRATION; INTRACAUDAL
Status: COMPLETED | OUTPATIENT
Start: 2020-09-30 | End: 2020-09-30

## 2020-09-30 RX ORDER — PROPOFOL 10 MG/ML
INJECTION, EMULSION INTRAVENOUS PRN
Status: DISCONTINUED | OUTPATIENT
Start: 2020-09-30 | End: 2020-09-30 | Stop reason: SDUPTHER

## 2020-09-30 RX ORDER — SODIUM CHLORIDE 9 MG/ML
INJECTION, SOLUTION INTRAVENOUS CONTINUOUS PRN
Status: DISCONTINUED | OUTPATIENT
Start: 2020-09-30 | End: 2020-09-30 | Stop reason: SDUPTHER

## 2020-09-30 RX ORDER — SODIUM CHLORIDE 0.9 % (FLUSH) 0.9 %
10 SYRINGE (ML) INJECTION EVERY 12 HOURS SCHEDULED
Status: DISCONTINUED | OUTPATIENT
Start: 2020-09-30 | End: 2020-09-30 | Stop reason: HOSPADM

## 2020-09-30 RX ORDER — GLYCOPYRROLATE 0.2 MG/ML
INJECTION INTRAMUSCULAR; INTRAVENOUS PRN
Status: DISCONTINUED | OUTPATIENT
Start: 2020-09-30 | End: 2020-09-30 | Stop reason: SDUPTHER

## 2020-09-30 RX ORDER — LIDOCAINE HYDROCHLORIDE 20 MG/ML
INJECTION, SOLUTION EPIDURAL; INFILTRATION; INTRACAUDAL; PERINEURAL PRN
Status: DISCONTINUED | OUTPATIENT
Start: 2020-09-30 | End: 2020-09-30 | Stop reason: SDUPTHER

## 2020-09-30 RX ORDER — DEXAMETHASONE SODIUM PHOSPHATE 4 MG/ML
INJECTION, SOLUTION INTRA-ARTICULAR; INTRALESIONAL; INTRAMUSCULAR; INTRAVENOUS; SOFT TISSUE PRN
Status: DISCONTINUED | OUTPATIENT
Start: 2020-09-30 | End: 2020-09-30 | Stop reason: SDUPTHER

## 2020-09-30 RX ORDER — EPHEDRINE SULFATE/0.9% NACL/PF 50 MG/5 ML
SYRINGE (ML) INTRAVENOUS PRN
Status: DISCONTINUED | OUTPATIENT
Start: 2020-09-30 | End: 2020-09-30 | Stop reason: SDUPTHER

## 2020-09-30 RX ORDER — BUDESONIDE AND FORMOTEROL FUMARATE DIHYDRATE 80; 4.5 UG/1; UG/1
2 AEROSOL RESPIRATORY (INHALATION) 2 TIMES DAILY
Status: DISCONTINUED | OUTPATIENT
Start: 2020-09-30 | End: 2020-10-04 | Stop reason: HOSPADM

## 2020-09-30 RX ORDER — SODIUM CHLORIDE 0.9 % (FLUSH) 0.9 %
10 SYRINGE (ML) INJECTION PRN
Status: DISCONTINUED | OUTPATIENT
Start: 2020-09-30 | End: 2020-09-30 | Stop reason: HOSPADM

## 2020-09-30 RX ORDER — SODIUM CHLORIDE 9 MG/ML
INJECTION, SOLUTION INTRAVENOUS CONTINUOUS
Status: DISCONTINUED | OUTPATIENT
Start: 2020-09-30 | End: 2020-10-04 | Stop reason: HOSPADM

## 2020-09-30 RX ORDER — SUCCINYLCHOLINE/SOD CL,ISO/PF 200MG/10ML
SYRINGE (ML) INTRAVENOUS PRN
Status: DISCONTINUED | OUTPATIENT
Start: 2020-09-30 | End: 2020-09-30 | Stop reason: SDUPTHER

## 2020-09-30 RX ORDER — OXYCODONE HYDROCHLORIDE AND ACETAMINOPHEN 5; 325 MG/1; MG/1
1 TABLET ORAL PRN
Status: DISCONTINUED | OUTPATIENT
Start: 2020-09-30 | End: 2020-09-30 | Stop reason: HOSPADM

## 2020-09-30 RX ORDER — ONDANSETRON 2 MG/ML
INJECTION INTRAMUSCULAR; INTRAVENOUS PRN
Status: DISCONTINUED | OUTPATIENT
Start: 2020-09-30 | End: 2020-09-30 | Stop reason: SDUPTHER

## 2020-09-30 RX ORDER — FENTANYL CITRATE 50 UG/ML
INJECTION, SOLUTION INTRAMUSCULAR; INTRAVENOUS PRN
Status: DISCONTINUED | OUTPATIENT
Start: 2020-09-30 | End: 2020-09-30 | Stop reason: SDUPTHER

## 2020-09-30 RX ORDER — ALBUTEROL SULFATE 90 UG/1
2 AEROSOL, METERED RESPIRATORY (INHALATION) EVERY 6 HOURS PRN
Status: DISCONTINUED | OUTPATIENT
Start: 2020-09-30 | End: 2020-10-04 | Stop reason: HOSPADM

## 2020-09-30 RX ORDER — MAGNESIUM HYDROXIDE 1200 MG/15ML
LIQUID ORAL CONTINUOUS PRN
Status: COMPLETED | OUTPATIENT
Start: 2020-09-30 | End: 2020-09-30

## 2020-09-30 RX ADMIN — DEXAMETHASONE SODIUM PHOSPHATE 8 MG: 4 INJECTION, SOLUTION INTRAMUSCULAR; INTRAVENOUS at 18:04

## 2020-09-30 RX ADMIN — FENTANYL CITRATE 50 MCG: 50 INJECTION INTRAMUSCULAR; INTRAVENOUS at 18:35

## 2020-09-30 RX ADMIN — SODIUM CHLORIDE: 9 INJECTION, SOLUTION INTRAVENOUS at 15:57

## 2020-09-30 RX ADMIN — MORPHINE SULFATE 2 MG: 2 INJECTION, SOLUTION INTRAMUSCULAR; INTRAVENOUS at 00:17

## 2020-09-30 RX ADMIN — GLYCOPYRROLATE 0.2 MG: 0.2 INJECTION, SOLUTION INTRAMUSCULAR; INTRAVENOUS at 18:04

## 2020-09-30 RX ADMIN — LIDOCAINE HYDROCHLORIDE 60 MG: 20 INJECTION, SOLUTION EPIDURAL; INFILTRATION; INTRACAUDAL; PERINEURAL at 17:59

## 2020-09-30 RX ADMIN — SODIUM CHLORIDE, PRESERVATIVE FREE 10 ML: 5 INJECTION INTRAVENOUS at 20:37

## 2020-09-30 RX ADMIN — CEFAZOLIN SODIUM 2 G: 10 INJECTION, POWDER, FOR SOLUTION INTRAVENOUS at 17:54

## 2020-09-30 RX ADMIN — Medication 10 MG: at 18:57

## 2020-09-30 RX ADMIN — MORPHINE SULFATE 2 MG: 2 INJECTION, SOLUTION INTRAMUSCULAR; INTRAVENOUS at 13:02

## 2020-09-30 RX ADMIN — Medication 120 MG: at 17:59

## 2020-09-30 RX ADMIN — Medication 300 MCG: at 18:49

## 2020-09-30 RX ADMIN — BUDESONIDE AND FORMOTEROL FUMARATE DIHYDRATE 2 PUFF: 80; 4.5 AEROSOL RESPIRATORY (INHALATION) at 08:56

## 2020-09-30 RX ADMIN — PANTOPRAZOLE SODIUM 40 MG: 40 TABLET, DELAYED RELEASE ORAL at 06:04

## 2020-09-30 RX ADMIN — Medication 30 MG: at 17:59

## 2020-09-30 RX ADMIN — MORPHINE SULFATE 2 MG: 2 INJECTION, SOLUTION INTRAMUSCULAR; INTRAVENOUS at 09:55

## 2020-09-30 RX ADMIN — Medication 200 MCG: at 18:30

## 2020-09-30 RX ADMIN — FENTANYL CITRATE 50 MCG: 50 INJECTION INTRAMUSCULAR; INTRAVENOUS at 19:06

## 2020-09-30 RX ADMIN — LEVOTHYROXINE SODIUM 50 MCG: 0.05 TABLET ORAL at 06:04

## 2020-09-30 RX ADMIN — TIOTROPIUM BROMIDE INHALATION SPRAY 2 PUFF: 3.12 SPRAY, METERED RESPIRATORY (INHALATION) at 08:56

## 2020-09-30 RX ADMIN — ONDANSETRON 4 MG: 2 INJECTION INTRAMUSCULAR; INTRAVENOUS at 18:04

## 2020-09-30 RX ADMIN — Medication 200 MCG: at 18:24

## 2020-09-30 RX ADMIN — PROPOFOL 30 MG: 10 INJECTION, EMULSION INTRAVENOUS at 17:59

## 2020-09-30 RX ADMIN — Medication 10 MG: at 19:06

## 2020-09-30 RX ADMIN — Medication 10 MG: at 19:03

## 2020-09-30 RX ADMIN — SODIUM CHLORIDE, POTASSIUM CHLORIDE, SODIUM LACTATE AND CALCIUM CHLORIDE: 600; 310; 30; 20 INJECTION, SOLUTION INTRAVENOUS at 00:18

## 2020-09-30 RX ADMIN — SODIUM CHLORIDE: 9 INJECTION, SOLUTION INTRAVENOUS at 17:53

## 2020-09-30 RX ADMIN — MORPHINE SULFATE 2 MG: 2 INJECTION, SOLUTION INTRAMUSCULAR; INTRAVENOUS at 06:05

## 2020-09-30 RX ADMIN — Medication 20 MG: at 19:00

## 2020-09-30 RX ADMIN — Medication 200 MCG: at 18:53

## 2020-09-30 ASSESSMENT — PULMONARY FUNCTION TESTS
PIF_VALUE: 17
PIF_VALUE: 15
PIF_VALUE: 18
PIF_VALUE: 18
PIF_VALUE: 1
PIF_VALUE: 11
PIF_VALUE: 15
PIF_VALUE: 1
PIF_VALUE: 1
PIF_VALUE: 14
PIF_VALUE: 17
PIF_VALUE: 16
PIF_VALUE: 17
PIF_VALUE: 2
PIF_VALUE: 15
PIF_VALUE: 27
PIF_VALUE: 18
PIF_VALUE: 17
PIF_VALUE: 18
PIF_VALUE: 15
PIF_VALUE: 20
PIF_VALUE: 15
PIF_VALUE: 1
PIF_VALUE: 2
PIF_VALUE: 17
PIF_VALUE: 0
PIF_VALUE: 17
PIF_VALUE: 17
PIF_VALUE: 18
PIF_VALUE: 18
PIF_VALUE: 16
PIF_VALUE: 16
PIF_VALUE: 17
PIF_VALUE: 18
PIF_VALUE: 17
PIF_VALUE: 15
PIF_VALUE: 17
PIF_VALUE: 18
PIF_VALUE: 3
PIF_VALUE: 3
PIF_VALUE: 18
PIF_VALUE: 16
PIF_VALUE: 17
PIF_VALUE: 16
PIF_VALUE: 18
PIF_VALUE: 15
PIF_VALUE: 17
PIF_VALUE: 15
PIF_VALUE: 18
PIF_VALUE: 15
PIF_VALUE: 17
PIF_VALUE: 15
PIF_VALUE: 18
PIF_VALUE: 16
PIF_VALUE: 14
PIF_VALUE: 16
PIF_VALUE: 26
PIF_VALUE: 17
PIF_VALUE: 17
PIF_VALUE: 15
PIF_VALUE: 17
PIF_VALUE: 3
PIF_VALUE: 0
PIF_VALUE: 17
PIF_VALUE: 16
PIF_VALUE: 17
PIF_VALUE: 22
PIF_VALUE: 18
PIF_VALUE: 16
PIF_VALUE: 18
PIF_VALUE: 18
PIF_VALUE: 15
PIF_VALUE: 17

## 2020-09-30 ASSESSMENT — PAIN DESCRIPTION - ORIENTATION
ORIENTATION: LEFT

## 2020-09-30 ASSESSMENT — PAIN DESCRIPTION - ONSET
ONSET: ON-GOING

## 2020-09-30 ASSESSMENT — PAIN DESCRIPTION - PAIN TYPE
TYPE: ACUTE PAIN

## 2020-09-30 ASSESSMENT — PAIN SCALES - GENERAL
PAINLEVEL_OUTOF10: 0
PAINLEVEL_OUTOF10: 7
PAINLEVEL_OUTOF10: 0
PAINLEVEL_OUTOF10: 10
PAINLEVEL_OUTOF10: 0
PAINLEVEL_OUTOF10: 6
PAINLEVEL_OUTOF10: 0
PAINLEVEL_OUTOF10: 7
PAINLEVEL_OUTOF10: 0
PAINLEVEL_OUTOF10: 0
PAINLEVEL_OUTOF10: 7

## 2020-09-30 ASSESSMENT — PAIN DESCRIPTION - LOCATION
LOCATION: HIP

## 2020-09-30 ASSESSMENT — PAIN DESCRIPTION - DESCRIPTORS
DESCRIPTORS: SHARP;SORE
DESCRIPTORS: SHARP
DESCRIPTORS: SHARP;SORE
DESCRIPTORS: SHARP;SORE
DESCRIPTORS: SHARP

## 2020-09-30 ASSESSMENT — PAIN - FUNCTIONAL ASSESSMENT
PAIN_FUNCTIONAL_ASSESSMENT: PREVENTS OR INTERFERES SOME ACTIVE ACTIVITIES AND ADLS
PAIN_FUNCTIONAL_ASSESSMENT: 0-10
PAIN_FUNCTIONAL_ASSESSMENT: PREVENTS OR INTERFERES SOME ACTIVE ACTIVITIES AND ADLS

## 2020-09-30 ASSESSMENT — PAIN DESCRIPTION - FREQUENCY
FREQUENCY: INTERMITTENT

## 2020-09-30 ASSESSMENT — PAIN DESCRIPTION - PROGRESSION
CLINICAL_PROGRESSION: NOT CHANGED

## 2020-09-30 NOTE — CONSULTS
colonoscopy was 16 due to hematochezia. Colonoscopy showed multiple small and large diverticulum in the sigmoid colon. No polyps, masses, or AVMs. The colon was tortuous with pressure applied to the LLQ allowing for visualization of the cecum. At the time, patient's hematochezia was suspected to be secondary to a diverticular bleed. Further colonoscopies were not recommended due to the patient's age. Prior Endoscopic Evaluations:  Colonoscopy 16 with Dr. Argelia Cortez  1. Multiple small and large diverticulum were noted in the sigmoid colon. 2. No polyps, masses, AVMs, or any other significant abnormalities. 3. The colon was tortuous with pressure applied to the LLQ allowing for visualization of the cecum. PAST MEDICAL, SURGICAL, FAMILY, and SOCIAL HISTORY     Past Medical History:   Diagnosis Date    Arthritis     BPH with obstruction/lower urinary tract symptoms     Dr. Bang Parent Diverticular disease     Diverticulosis of sigmoid colon with h/o bleeding    Ectropion of right eye     Emphysema of lung ([facility])     PLASCENCIA    Hypercholesteremia     Macular degeneration     Dr. Ladan Duckworth Moderate mitral regurgitation 2017    with mild Aortic Regurge and diastolic dysfunction    PONV (postoperative nausea and vomiting)     Squamous cell skin cancer     Nose and face    Vitamin D deficiency     Wears glasses      Past Surgical History:   Procedure Laterality Date    CATARACT REMOVAL WITH IMPLANT Bilateral     COLONOSCOPY  2016   Richy Hauser     Family History   Problem Relation Age of Onset   Concepcion Luevano Other Mother         old age  80   Concepcion Chloé Other Father         old age   Concepcion Luevano Cancer Sister    [address] Sclerosis Daughter      Social History     Socioeconomic History    Marital status:      Spouse name:  Anatoliy Garcia ([id number])    Number of children: 8    Years of education: None    Highest education level: None   Occupational History    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/   Social Needs    Financial resource strain: None    Food insecurity     Worry: None     Inability: None    Transportation needs     Medical: None     Non-medical: None   Tobacco Use    Smoking status: Former Smoker    Smokeless tobacco: Never Used    Tobacco comment: quit 50 years ago   Substance and Sexual Activity    Alcohol use:  Yes     Alcohol/week: 0.0 standard drinks     Comment: infrequently    Drug use: No    Sexual activity: Never   Lifestyle    Physical activity     Days per week: None     Minutes per session: None    Stress: None   Relationships    Social connections     Talks on phone: None     Gets together: None     Attends [employer] service: None     Active member of club or organization: None     Attends meetings of clubs or organizations: None     Relationship status: None    Intimate partner violence     Fear of current or ex partner: None     Emotionally abused: None     Physically abused: None     Forced sexual activity: None   Other Topics Concern    None   Social History Narrative    None       MEDICATIONS   SCHEDULED:  budesonide-formoterol, 2 puff, BID  tiotropium, 2 puff, Daily  [START ON 10/1/2020] enoxaparin, 40 mg, Daily  levothyroxine, 50 mcg, Daily  pantoprazole, 40 mg, QAM AC  tamsulosin, 0.4 mg, Daily  sodium chloride flush, 10 mL, 2 times per day      FLUIDS/DRIPS:     lactated ringers 100 mL/hr at 09/30/20 0018     PRNs: albuterol sulfate HFA, 2 puff, Q6H PRN  sodium chloride flush, 10 mL, PRN  promethazine, 12.5 mg, Q6H PRN    Or  ondansetron, 4 mg, Q6H PRN  potassium chloride, 10 mEq, PRN  magnesium sulfate, 2 g, PRN  morphine, 2 mg, Q3H PRN      ALLERGIES:  He   Allergies   Allergen Reactions    Erythromycin Base Other (See Comments)     Unable to recall reaction  Unable to recall reaction    Fentanyl     Penicillins Other (See Comments)     Passed out  Passed out  Passed out      Versed [Midazolam] Nausea And Vomiting       REVIEW OF SYSTEMS   Pertinent ROS noted in HPI    PHYSICAL EXAM     Vitals:    09/30/20 0025 09/30/20 0735 09/30/20 0857 09/30/20 1139   BP:  134/66  (!) 150/68   Pulse:  76  81   Resp:  16 18 16   Temp:  98.3 °F (36.8 °C)  98.3 °F (36.8 °C)   TempSrc:  Oral  Oral   SpO2:  93% 93% 92%   Weight: 155 lb 10.3 oz (70.6 kg)      Height:           I/O last 3 completed shifts: In: 0   Out: 800 [Urine:800]      Physical Exam:  General appearance: alert, cooperative, no distress, appears stated age  Eyes: Anicteric  Head: Normocephalic, without obvious abnormality  Lungs: clear to auscultation bilaterally, Normal Effort  Heart: regular rate and rhythm, normal S1 and S2, no murmurs or rubs  Abdomen: soft, non-distended, non-tender. Bowel sounds normal. No masses,  no organomegaly. Extremities: no cyanosis or edema. Left leg shorted and externally rotated. Skin: warm and dry, no jaundice  Neuro: Grossly intact, A&OX3      LABS AND IMAGING   Laboratory   Recent Labs     09/29/20 1948   WBC 14.8*   HGB 12.9*   HCT 38.1*   MCV 94.9        Recent Labs     09/29/20 1948   *   K 4.9      CO2 22   BUN 28*   CREATININE 1.1     Recent Labs     09/29/20 1948   AST 28   ALT 17   BILITOT 0.7   ALKPHOS 116     No results for input(s): LIPASE, AMYLASE in the last 72 hours. Recent Labs     09/29/20 1948   PROTIME 13.0   INR 1.12       Imaging  CT CHEST WO CONTRAST   Final Result   1. No evidence of pneumonia   2. Chronic right pleural effusion with pleural thickening and adjacent right   lower lobe atelectasis   3. Emphysema, chronic interstitial changes, stable left pulmonary nodules and   pleural plaques   4. Right juxta cardiac adenopathy concerning for metastatic disease   5. New hepatic metastatic disease   6. Hiatal hernia with gastroesophageal reflux         CT Cervical Spine WO Contrast   Final Result   No acute abnormality of the cervical spine.          CT Head WO Contrast   Final Result No acute intracranial abnormality. XR FEMUR LEFT (MIN 2 VIEWS)   Final Result   Acute fracture of the left hip. XR HIP 2-3 VW W PELVIS LEFT   Final Result   Acute fracture of the left hip. XR CHEST PORTABLE   Final Result   Airspace disease in the right lower lung, most compatible with pneumonia,   with a small parapneumonic effusion. There is sharp margination of the right heart border, which is new when   compared to the previous exam, raising the possibility of lobar collapse. Consider further evaluation with chest CT.               ASSESSMENT AND RECOMMENDATIONS   80 y.o. male with a PMH of hypercholesterolemia, emphysema, and arthritis who presented 9/29/2020 with left hip pain. Patient arrived by EMS yesterday evening after he lost his balanced and fell. He reported the bulk of his pain was in the left hip/femur area. He also hit his head at the time. Imaging in the ED revealed a left hip fracture. CT head was benign for any intracranial process. CT chest w/o contrast showed right juxta cardiac adenopathy, concerning for metastatic disease as well as new multiple hepatic masses, for which we have been consulted. IMPRESSION:    1. New hepatic metastatic disease  -CT chest w/o contrast yesterday, 9/29, showed right juxta cardiac adenopathy concerning for metastatic disease as well as new multiple hepatic masses.  -No family or personal hx of colon cancer or liver disease. Last colonoscopy 6/20/16 was negative for any masses or polyps. Only significant finding was diverticulitis of the sigmoid colon and tortuous colon. Denies ETOH use. Former heavy smoker, quit 50 years ago. -LFTs grossly normal. PT/INR wnl.  -Denies recent drastic weight loss. Reports ~25lb loss over the past two years secondary to decreased appetite. -CT abd/pelvis ordered to assess further  2. Left hip fracture  -Orthopedic surgery following.   To OR today at 4pm.    RECOMMENDATIONS:      1) CT abd/pelvis ordered for further assessment  2) Will discuss case with Dr. Delilah Young. Please await further input and recommendations. If you have any questions or need any further information, please feel free to contact our consult team.  Thank you for allowing us to participate in the care of Enoz Mckeon. The note was completed using Dragon voice recognition transcription. Every effort was made to ensure accuracy; however, inadvertent transcription errors may be present despite my best efforts to edit errors.       Ynes Marie PA-C

## 2020-09-30 NOTE — PROGRESS NOTES
Patient is alert & oriented x4, pt on bedrest, 2/4 bed rails up, bed in lowest position, fall precautions in place, call light within reach. Nieves draining yellow urine. Pt NPO. Spoke with chico in pre op,  for 1500. Will inform patient. Pt also states has not spoken to surgeon yet so consent has not been signed. Will cont to monitor and reassess.   Electronically signed by Cliff Rueda RN on 9/30/2020 at 8:26 AM

## 2020-09-30 NOTE — PLAN OF CARE
monitor skin and mucous members. Will turn patient every 2 hours, monitor for friction and sheering, and change dressings as needed. Will preform skin assessment every shift.

## 2020-09-30 NOTE — CARE COORDINATION
Referral to [address] [place] [address] received. Patient having surgery today. Will follow patient's progress along with therapy recommendations tomorrow.

## 2020-09-30 NOTE — PROGRESS NOTES
AAO4, No falls noted this shift. Patient ambulates with x1 staff assistance without difficulty. FBed kept in low position. Safe environment maintained. Bedside table & call light in reach. Uses call light appropriately when needing assistance. Vitals signs are stable.   Intake and output are being recorded, will continue to monitor

## 2020-09-30 NOTE — H&P
ORTHOPAEDIC NEW PATIENT NOTE    Chief Complaint   Patient presents with    Fall     Mechanical fall, Left femur pain. Small abrasion on left side of scalp. Patient is A/O x4. Patient was 84% on RA, does not wear O2 at home. Received 30 mg Ketamine en route.  Leg Injury       HPI  80 y.o. male seen for evaluation of left hip injury/fracture: Onset yesterday  Injury/trauma - fell at home  History of symptoms denies  Pain is located left hip only   Denies pain elsewhere  Worse with movement, WB, pressure  Better with rest, pain meds  Associated with N/A    No head trauma or LOC  Not on blood thinners  Lives at home with his wife  [employer], does not use cane or walker    Review of Systems  Constitutional - denies fevers, weight loss  Cardiovascular - denies chest pain, palpitations, peripheral edema, blood clots  Respiratory - denies SOB, cough currently;   Does have history of COPD/emphysema  Gastrointestinal - denies abdominal pain, nausea, vomiting  Genitourinary - denies dysuria, discharge  Musculoskeletal - per HPI  Integumentary - denies rash, sores  Neurologic - denies numbness, tingling, paresthesias  Hematologic - denies abnormal bleeding, blood clots  Allergic/Immunologic - denies metal allergies, recurrent infections    Allergies   Allergen Reactions    Erythromycin Base Other (See Comments)     Unable to recall reaction  Unable to recall reaction    Fentanyl     Penicillins Other (See Comments)     Passed out  Passed out  Passed out      Versed [Midazolam] Nausea And Vomiting        Current Facility-Administered Medications   Medication Dose Route Frequency Provider Last Rate Last Dose    budesonide-formoterol (SYMBICORT) 80-4.5 MCG/ACT inhaler 2 puff  2 puff Inhalation BID Mega Sanchez MD   2 puff at 09/30/20 0856    tiotropium (SPIRIVA RESPIMAT) 2.5 MCG/ACT inhaler 2 puff  2 puff Inhalation Daily Donna Sanchez MD   2 puff at 09/30/20 0856    [START ON 10/1/2020] enoxaparin (LOVENOX) injection 40 mg  40 mg Subcutaneous Daily Susana Watson MD        0.9 % sodium chloride infusion   Intravenous Continuous Nithin Jordan MD 75 mL/hr at 09/30/20 1557      sodium chloride flush 0.9 % injection 10 mL  10 mL Intravenous 2 times per day Nithin Jordan MD        sodium chloride flush 0.9 % injection 10 mL  10 mL Intravenous PRN Nithin Jordan MD        albuterol sulfate  (90 Base) MCG/ACT inhaler 2 puff  2 puff Inhalation Q6H PRN RANDA Angel - CNP        ceFAZolin (ANCEF) 2 g in dextrose 5 % 100 mL IVPB  2 g Intravenous Once Susana Watson MD        fentaNYL (SUBLIMAZE) injection 25 mcg  25 mcg Intravenous Q5 Min PRN Nithin Jordan MD        HYDROmorphone (DILAUDID) injection 0.5 mg  0.5 mg Intravenous Q5 Min PRN Nithin Jordan MD        fentaNYL (SUBLIMAZE) injection 25 mcg  25 mcg Intravenous Q5 Min PRN Nithin Jordan MD        HYDROmorphone (DILAUDID) injection 0.5 mg  0.5 mg Intravenous Q5 Min PRN Nithin Jordan MD        oxyCODONE-acetaminophen (PERCOCET) 5-325 MG per tablet 1 tablet  1 tablet Oral PRN Nithin Jordan MD        Or    oxyCODONE-acetaminophen (PERCOCET) 5-325 MG per tablet 2 tablet  2 tablet Oral PRN Nithin Jordan MD        [facility]ron [facility] injection 4 mg  4 mg Intravenous Once PRN Nithin Jordan MD        levothyroxine (SYNTHROID) tablet 50 mcg  50 mcg Oral Daily Nola Lui MD   50 mcg at 09/30/20 0604    pantoprazole (PROTONIX) tablet 40 mg  40 mg Oral QAM AC Mega Sanchez MD   40 mg at 09/30/20 0604    tamsulosin (FLOMAX) capsule 0.4 mg  0.4 mg Oral Daily Nola Lui MD   Stopped at 09/30/20 0757    sodium chloride flush 0.9 % injection 10 mL  10 mL Intravenous 2 times per day Nola Lui MD        sodium chloride flush 0.9 % injection 10 mL  10 mL Intravenous PRN Mega Sanchez MD        promethazine (PHENERGAN) tablet 12.5 mg  12.5 mg Oral Q6H HOLLY VALERIO MD Daniel        Or    ondansetron (ZOFRAN) injection 4 mg  4 mg Intravenous Q6H PRN Mega Sanchez MD        potassium chloride 10 mEq/100 mL IVPB (Peripheral Line)  10 mEq Intravenous PRN Mega Sanchez MD        magnesium sulfate 2 g in 50 mL IVPB premix  2 g Intravenous PRN Mega Sanchez MD        morphine (PF) injection 2 mg  2 mg Intravenous Q3H PRN Mega Sanchez MD   2 mg at 20 1302    lactated ringers infusion   Intravenous Continuous Veryl MD Roberto 100 mL/hr at 20 0018         Past Medical History:   Diagnosis Date    Arthritis     BPH with obstruction/lower urinary tract symptoms     Dr. Camelia Hernandes    Diverticular disease     Diverticulosis of sigmoid colon with h/o bleeding    Ectropion of right eye     Emphysema of lung (HCC)     PLASCENCIA    Hypercholesteremia     Macular degeneration     Dr. Katie Isaac Moderate mitral regurgitation 2017    with mild Aortic Regurge and diastolic dysfunction    PONV (postoperative nausea and vomiting)     Squamous cell skin cancer     Nose and face    Vitamin D deficiency     Wears glasses         Past Surgical History:   Procedure Laterality Date    CATARACT REMOVAL WITH IMPLANT Bilateral     COLONOSCOPY  2016   Rony Fontaine       Family History   Problem Relation Age of Onset    Other Mother         old age  80   [facility] Other Father         old age   [facility] Cancer Sister     Cancer Brother     Mult Sclerosis Daughter        Social History     Socioeconomic History    Marital status:      Spouse name:  Nancy Arenas ([id number])    Number of children: 6    Years of education: Not on file    Highest education level: Not on file   Occupational History    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/   Social Needs    Financial resource strain: Not on file    Food insecurity     Worry: Not on file     Inability: Not on file   [employer] needs     Medical: Not on file Non-medical: Not on file   Tobacco Use    Smoking status: Former Smoker    Smokeless tobacco: Never Used    Tobacco comment: quit 50 years ago   Substance and Sexual Activity    Alcohol use: Yes     Alcohol/week: 0.0 standard drinks     Comment: infrequently    Drug use: No    Sexual activity: Never   Lifestyle    Physical activity     Days per week: Not on file     Minutes per session: Not on file    Stress: Not on file   Relationships    Social connections     Talks on phone: Not on file     Gets together: Not on file     Attends Orthodoxy service: Not on file     Active member of club or organization: Not on file     Attends meetings of clubs or organizations: Not on file     Relationship status: Not on file    Intimate partner violence     Fear of current or ex partner: Not on file     Emotionally abused: Not on file     Physically abused: Not on file     Forced sexual activity: Not on file   Other Topics Concern    Not on file   Social History Narrative    Not on file        Vitals:    09/30/20 0735 09/30/20 0857 09/30/20 1139 09/30/20 1543   BP: 134/66  (!) 150/68 (!) 145/69   Pulse: 76  81 93   Resp: 16 18 16 14   Temp: 98.3 °F (36.8 °C)  98.3 °F (36.8 °C) 97.2 °F (36.2 °C)   TempSrc: Oral  Oral Temporal   SpO2: 93% 93% 92% 97%   Weight:       Height:           Physical Exam  Constitutional - well-groomed, well-nourished, Body mass index is 21.11 kg/m².   Psychiatric - pleasant,  normal mood & affect, oriented to place, person, and situation  Cardiovascular - RRR, negative peripheral edema, dorsalis pedis pulse 2+  Respiratory - respirations unlabored, on room air  HEENT - normocephalic atraumatic  Chest - no TTP rib cage, no crepitus  Gastrointestinal - abdomen soft NDNT  Skin - no rashes, wounds, or lesions seen on exposed skin  Neck/Spine - moderately decreased cervical ROM, no TTP of spinous processes, no TTP of paraspinal musculature,  negative Spurling's  Neurological - SILT SP/DP/T/sural/saphenous nerve distributions; EHL/FHL/TA/GS intact  BUE and RLE - no deformity/swelling/ecchymosis   No TTP    AROM major joints intact  LLE - shortened and externally rotated   TTP hip   + log roll   No TTP distal thigh, knee, leg, ankle, foot        Imaging:  Images were personally reviewed by myself and discussed with the patient    Narrative    EXAMINATION:    4 XRAY VIEWS OF THE LEFT FEMUR; ONE XRAY VIEW OF THE PELVIS AND TWO XRAY    VIEWS LEFT HIP         9/29/2020 7:47 pm         COMPARISON:    None.         HISTORY:    ORDERING SYSTEM PROVIDED HISTORY: fall    TECHNOLOGIST PROVIDED HISTORY:    Reason for exam:->fall    Reason for Exam: Fall; Leg Injury    Acuity: Acute    Type of Exam: Initial    Mechanism of Injury: Fall; Leg Injury         FINDINGS:    Pelvis with left hip: There is a comminuted, intertrochanteric fracture of    the left hip with valgus angulation.  No hip dislocation is identified.         Left femur: No fracture of the distal femur is visualized              Impression    Acute fracture of the left hip.             Narrative    EXAMINATION:    CT OF THE CERVICAL SPINE WITHOUT CONTRAST 9/29/2020 7:53 pm         TECHNIQUE:    CT of the cervical spine was performed without the administration of    intravenous contrast. Multiplanar reformatted images are provided for review.     Dose modulation, iterative reconstruction, and/or weight based adjustment of    the mA/kV was utilized to reduce the radiation dose to as low as reasonably    achievable.         COMPARISON:    Chest CT 08/19/2019         HISTORY:    ORDERING SYSTEM PROVIDED HISTORY: fall    TECHNOLOGIST PROVIDED HISTORY:    Reason for exam:->fall    Reason for Exam: fall   bump ontop of head         FINDINGS:    BONES/ALIGNMENT: No evidence of cervical spine fracture.  Old mild T3 wedge    compression fracture.  Cervicothoracic scoliosis noted         DEGENERATIVE CHANGES: Diffuse degenerative disc disease most An informed consent form was signed and the patient will proceed with surgery tonight. The patient is at risk for osteoporosis and likely has or is at risk for fragility fractures. Therefore, I have recommended treatment with bisphosphonates, and DEXA screening if not recently performed to establish baseline values. Calcium and vitamin D supplementation may be indicated as well. Will defer to PCP.     Elsa Henderson

## 2020-09-30 NOTE — PROGRESS NOTES
AAO4, able to make needs know. Call l;ight in reach. Pain /discomfort being managed with PRN analgesics per MD orders. Patient able to express presence and absence of pain and rate pain appropriately using numerical scale. Vitals signs are stable.   Intake and output are being recorded, will continue to monitor

## 2020-09-30 NOTE — PROGRESS NOTES
Daughter in law Farnaz aware that Dr. Meryle Christen is running around 30 minutes late at this time. Pt is soundly asleep.    Electronically signed by Cliff Rueda RN on 9/30/2020 at 2:45 PM

## 2020-09-30 NOTE — H&P
[address]                              HISTORY AND PHYSICAL    PATIENT NAME: Aida Keith                   :        1927  MED REC NO:   [id number]                          ROOM:       0339  ACCOUNT NO:   [de-identified]                           ADMIT DATE: 2020  PROVIDER:     Erica Loyd MD    I obtained the history and performed the physical exam on the patient in  the emergency room on 2020. CHIEF COMPLAINT:  Fall. HISTORY OF PRESENT ILLNESS:  A 80nyearnold  male who presents  to the hospital, status post mechanical fall when he was turning around,  trying to grab on to something and lost his balance followed by  excruciating pain in the left hip and inability to get up from the  ground without any nausea, vomiting, fevers, or chills. No other  constitutional symptoms. No shortness or breath, chest pain, or chest  tightness. No nausea or vomiting. PAST MEDICAL/PAST SURGICAL HISTORY:  1. COPD. 2.  Hypertension. 3.  Dyslipidemia. 4.  Hypothyroidism. PAST SURGICAL HISTORY:  As noted above. ALLERGIC HISTORY:  Erythromycin, fentanyl, penicillin, Versed. FAMILY HISTORY:  Reviewed by me and is currently noncontributory. SOCIAL HISTORY:  Lives at home. Not current smoker, has a previous  history of smoking with resultant COPD. MEDICATIONS:  Fluticasone, Prilosec, Flomax, Synthroid, Proscar,  Dulcolax, vitamin D.    REVIEW OF SYSTEMS:  Significant for the excruciating left hip pain and  per the history of present illness. All others systems have been  reviewed and are negative except per the history of present illness. PHYSICAL EXAMINATION:  The patient was examined by me in the emergency  room.   VITAL SIGNS:  Temperature 98.4, respiratory rate 22, pulse 109, blood  pressure 155/94, saturating 96-97% on 4 L.  CNS:  Alert, awake, and oriented. PSYCH:  The patient is cooperative, answering questions appropriately. EYES:  Pupils are reactive to light. ENT:  Extraocular muscle movements intact. RESPIRATORY SYSTEM:  No rales or rhonchi. CVS:  S1 and S2 are heard. No murmurs or rubs. ABDOMEN:  Soft. MUSCULOSKELETAL EXAM:  The patient's left leg is significantly shortened  and externally rotated abnormally. SKIN:  No cutaneous rashes or lesions. The patient does not have any  skin tears. DIAGNOSTIC DATA:  UA negative for infection. Type and screen shows  A-negative. SARS-COVID 2 NAAT is negative, is not detected. CT chest without contrast shows no evidence of pneumonia. BUN 20, creatinine 1.1, sodium 135. . Troponin less than 0.01. CT C-spine without contrast shows no acute abnormality. Lactic acid 1.8. CT head shows no acute abnormality. X-ray of the hip shows acute  fracture of the left hip, coming in the intertrochanteric. White count is 14.8. CONSULTATIONS REQUESTED BY ME:  None. EKG independently reviewed by me shows normal sinus rhythm. ASSESSMENT:  1. Posttraumatic intertrochanteric fracture. 2.  Hypertension. 3.  Dyslipidemia. 4.  COPD. PLAN OF CARE:  The patient is admitted to the Internal Medicine Service. NPO after midnight. IV hydration. IV narcotic analgesia for pain  control. DVT prophylaxis with Lovenox. Home breathing treatment for the patient's COPD will be continued. CODE STATUS:  DNRCCA. This was discussed with the patient by me. EXPECTED LENGTH OF STAY:  More than two midnights based on the plan of  care above. RISK:  High due to patient's need for IV narcotic analgesia for pain  control. DISPOSITION:  Admitted to the Internal Medicine Service.       Germán Amin MD    D: 09/30/2020 5:59:26       T: 09/30/2020 6:56:41     [email or web address]  Job#: [id number]     Doc#: [id number]    CC:

## 2020-09-30 NOTE — PROGRESS NOTES
Patient off floor in OR. Will see tomorrow. Will plan CT A&P for further evaluation for primary.   If negative then EGD/colon if patient wants full evaluation  Rodney Snowden MD

## 2020-09-30 NOTE — PROGRESS NOTES
Arrives to PACU, vital signs stable, IV infusing without complications, OPA in place, respirations easy and even.

## 2020-09-30 NOTE — ANESTHESIA PRE PROCEDURE
[facility] Department of Anesthesiology  Pre-Anesthesia Evaluation/Consultation       Name:  Ubaldo Sheth  : 1927  Age:  80 y.o.                                            MRN:  [id number]  Date: 2020           Surgeon: Surgeon(s):  Dipak Maharaj MD    Procedure: Procedure(s):  LEFT HIP NAILING     Allergies   Allergen Reactions    Erythromycin Base Other (See Comments)     Unable to recall reaction  Unable to recall reaction    Fentanyl     Penicillins Other (See Comments)     Passed out  Passed out  Passed out      Versed [Midazolam] Nausea And Vomiting     Patient Active Problem List   Diagnosis    Moderate COPD (chronic obstructive pulmonary disease) ([facility]    BPH with obstruction/lower urinary tract symptoms    Hypercholesteremia    Diverticular disease    Vitamin D deficiency    Ectropion of right eye    Squamous cell skin cancer    Macular degeneration    Hypertension, isolated systolic    Do not resuscitate status    Centrilobular emphysema ([facility] [address] [facility]    Former smoker    Pleural effusion    Acquired hypothyroidism    Closed 2-part intertrochanteric fracture of proximal end of right femur, initial encounter ([facility] [facility]    Closed nondisplaced intertrochanteric fracture of left femur ([facility] [facility]     Past Medical History:   Diagnosis Date    Arthritis     BPH with obstruction/lower urinary tract symptoms     Dr. Brooks Johns    Diverticular disease     Diverticulosis of sigmoid colon with h/o bleeding    Ectropion of right eye     Emphysema of lung (HCC)     PLASCENCIA    Hypercholesteremia     Macular degeneration     Dr. Sky Perez    Moderate mitral regurgitation 2017    with mild Aortic Regurge and diastolic dysfunction    PONV (postoperative nausea and vomiting)     Squamous cell skin cancer     Nose and face    Vitamin D deficiency     Wears glasses      Past Surgical History:   Procedure Laterality Date    CATARACT REMOVAL WITH IMPLANT Bilateral     COLONOSCOPY  2016 Bozena Abbott     Social History     Tobacco Use    Smoking status: Former Smoker    Smokeless tobacco: Never Used    Tobacco comment: quit 50 years ago   Substance Use Topics    Alcohol use: Yes     Alcohol/week: 0.0 standard drinks     Comment: infrequently    Drug use: No     Medications  No current facility-administered medications on file prior to encounter.       Current Outpatient Medications on File Prior to Encounter   Medication Sig Dispense Refill    senna (SENOKOT) 8.6 MG tablet Take 1 tablet by mouth daily      Multiple Vitamins-Minerals (PRESERVISION AREDS 2 PO) Take by mouth 2 times daily      omeprazole (PRILOSEC) 40 MG delayed release capsule Take 1 capsule by mouth every morning (before breakfast) 30 capsule 5    tamsulosin (FLOMAX) 0.4 MG capsule TAKE ONE CAPSULE BY MOUTH DAILY 90 capsule 4    levothyroxine (SYNTHROID) 50 MCG tablet TAKE ONE TABLET BY MOUTH DAILY 90 tablet 3    finasteride (PROSCAR) 5 MG tablet TAKE ONE TABLET BY MOUTH DAILY 90 tablet 3    Cholecalciferol (VITAMIN D) 2000 UNITS CAPS capsule Take by mouth daily      fluticasone-umeclidin-vilant (TRELEGY ELLIPTA) 100-62.5-25 MCG/INH AEPB Inhale 1 puff into the lungs daily       Current Facility-Administered Medications   Medication Dose Route Frequency Provider Last Rate Last Dose    budesonide-formoterol (SYMBICORT) 80-4.5 MCG/ACT inhaler 2 puff  2 puff Inhalation BID Mega Sanchez MD   2 puff at 09/30/20 0856    tiotropium (SPIRIVA RESPIMAT) 2.5 MCG/ACT inhaler 2 puff  2 puff Inhalation Daily Mega Sanchez MD   2 puff at 09/30/20 0856    [START ON 10/1/2020] enoxaparin (LOVENOX) injection 40 mg  40 mg Subcutaneous Daily Davidson Knott MD        0.9 % sodium chloride infusion   Intravenous Continuous Katy Cantu MD 75 mL/hr at 09/30/20 1557      sodium chloride flush 0.9 % injection 10 mL  10 mL Intravenous 2 times per day Katy Cantu MD        sodium chloride flush 0.9 % injection 10 mL  10 mL Intravenous PRN Deric Reddy MD        albuterol sulfate  (90 Base) MCG/ACT inhaler 2 puff  2 puff Inhalation Q6H PRN Cyrus Heart, APRN - CNP        ceFAZolin (ANCEF) 2 g in dextrose 5 % 100 mL IVPB  2 g Intravenous Once Penny Clark MD        levothyroxine (SYNTHROID) tablet 50 mcg  50 mcg Oral Daily Corrine Boeck, MD   50 mcg at 09/30/20 0604    pantoprazole (PROTONIX) tablet 40 mg  40 mg Oral QAM AC Mega Sanchez MD   40 mg at 09/30/20 0604    tamsulosin (FLOMAX) capsule 0.4 mg  0.4 mg Oral Daily Corrine Boeck, MD   Stopped at 09/30/20 0757    sodium chloride flush 0.9 % injection 10 mL  10 mL Intravenous 2 times per day Corrine Boeck, MD        sodium chloride flush 0.9 % injection 10 mL  10 mL Intravenous PRN Mega Nice MD        promethazine (PHENERGAN) tablet 12.5 mg  12.5 mg Oral Q6H PRN Mega Sanchez MD        Or    ondansetron (ZOFRAN) injection 4 mg  4 mg Intravenous Q6H PRN Mega Sanchez MD        potassium chloride 10 mEq/100 mL IVPB (Peripheral Line)  10 mEq Intravenous PRN Mega Sanchez MD        magnesium sulfate 2 g in 50 mL IVPB premix  2 g Intravenous PRN Mega Nice MD        morphine (PF) injection 2 mg  2 mg Intravenous Q3H PRN Corrine Boeck, MD   2 mg at 09/30/20 1302    lactated ringers infusion   Intravenous Continuous Mega Sanchez  mL/hr at 09/30/20 0018       Vital Signs (Current)   Vitals:    09/30/20 0735 09/30/20 0857 09/30/20 1139 09/30/20 1543   BP: 134/66  (!) 150/68 (!) 145/69   Pulse: 76  81 93   Resp: 16 18 16 14   Temp: 98.3 °F (36.8 °C)  98.3 °F (36.8 °C) 97.2 °F (36.2 °C)   TempSrc: Oral  Oral Temporal   SpO2: 93% 93% 92% 97%   Weight:       Height:                                              BP Readings from Last 3 Encounters:   09/30/20 (!) 145/69   07/29/20 122/84   07/14/20 (!) 140/73     Vital Signs Statistics (for past 48 hrs)     Temp Lab Results   Component Value Date    PROTIME 13.0 09/29/2020    INR 1.12 09/29/2020    APTT 30.3 08/08/6769     HCG (If Applicable) No results found for: PREGTESTUR, PREGSERUM, HCG, HCGQUANT   ABGs No results found for: PHART, PO2ART, IUJ0OAP, BRP5FSL, BEART, C7EUHJRX   Type & Screen (If Applicable)  No results found for: LABABO, LABRH                         BMI: Wt Readings from Last 3 Encounters:       NPO Status:   Date of last liquid consumption: 09/29/20   Time of last liquid consumption: 2300   Date of last solid food consumption: 09/29/20      Time of last solid consumption: 2300       Anesthesia Evaluation  Patient summary reviewed and Nursing notes reviewed   history of anesthetic complications: PONV. Airway: Mallampati: II     Neck ROM: limited   Dental:    (+) edentulous      Pulmonary:   (+) COPD:                             Cardiovascular:    (+) hypertension:, valvular problems/murmurs: MR, CHF: diastolic, hyperlipidemia    (-) CABG/stent                Neuro/Psych:      (-) seizures and CVA           GI/Hepatic/Renal:        (-) no renal disease, bowel prep and no morbid obesity       Endo/Other:    (+) hypothyroidism, blood dyscrasia: anemia, arthritis:., electrolyte abnormalities, malignancy/cancer. Abdominal:           Vascular:     - DVT and PE. Anesthesia Plan      general     ASA 3     (Advanced age, diastolic dysfunction, COPD, and metastatic disease increase perioperative risk. Will suspend DNR perioperatively. )  Induction: intravenous. MIPS: Prophylactic antiemetics administered. Anesthetic plan and risks discussed with patient. Plan discussed with CRNA. Attending anesthesiologist reviewed and agrees with Pre Eval content            This pre-anesthesia assessment may be used as a history and physical.    DOS STAFF ADDENDUM:    Pt seen and examined, chart reviewed (including anesthesia, drug and allergy history).   No interval changes to history and physical examination. Anesthetic plan, risks, benefits, alternatives, and personnel involved discussed with patient. Patient verbalized an understanding and agrees to proceed.       Ira Hunt MD  September 30, 2020  4:06 PM

## 2020-09-30 NOTE — CONSULTS
[facility] Orthopedic Surgery  Consult Note    This patient is seen in consultation at the request of Dr Felicity Chavez    Reason for Consult:  Left hip fracture    CHIEF COMPLAINT:  Left hip pain    History Obtained From:  patient, electronic medical record    HISTORY OF PRESENT ILLNESS:    The patient is a 80 y.o. male who presents with left hip pain after a fall at his home yesterday. He states he tripped and landed on left hip and shoulder. Pain is described in left hip and thigh and with the intensity of mild at rest and severe if left leg moved. Pain is described as aching, shooting. Discomfort is intermittent,. He is alert and oriented in no distress. Daughter at bedside. No other complaints. Past Medical History:        Diagnosis Date    Arthritis     BPH with obstruction/lower urinary tract symptoms     Dr. Arron Snowden Diverticular disease     Diverticulosis of sigmoid colon with h/o bleeding    Ectropion of right eye     Emphysema of lung (HCC)     PLASCENCIA    Hypercholesteremia     Macular degeneration     Dr. Bridget Muñoz Moderate mitral regurgitation 2017    with mild Aortic Regurge and diastolic dysfunction    PONV (postoperative nausea and vomiting)     Squamous cell skin cancer     Nose and face    Vitamin D deficiency     Wears glasses        Past Surgical History:        Procedure Laterality Date    CATARACT REMOVAL WITH IMPLANT Bilateral     COLONOSCOPY  2016    MOHS SURGERY      Dr. Elton Sheppard       Social History     Tobacco Use    Smoking status: Former Smoker    Smokeless tobacco: Never Used    Tobacco comment: quit 50 years ago   Substance Use Topics    Alcohol use:  Yes     Alcohol/week: 0.0 standard drinks     Comment: infrequently       Family History   Problem Relation Age of Onset   Jaeger Other Mother         old age  80   Jaeger Other Father         old age   Jaeger Cancer Sister    [address] Sclerosis Daughter            Current Medications:   Current Facility-Administered Medications: budesonide-formoterol (SYMBICORT) 80-4.5 MCG/ACT inhaler 2 puff, 2 puff, Inhalation, BID  tiotropium (SPIRIVA RESPIMAT) 2.5 MCG/ACT inhaler 2 puff, 2 puff, Inhalation, Daily  [START ON 10/1/2020] enoxaparin (LOVENOX) injection 40 mg, 40 mg, Subcutaneous, Daily  albuterol sulfate  (90 Base) MCG/ACT inhaler 2 puff, 2 puff, Inhalation, Q6H PRN  levothyroxine (SYNTHROID) tablet 50 mcg, 50 mcg, Oral, Daily  pantoprazole (PROTONIX) tablet 40 mg, 40 mg, Oral, QAM AC  tamsulosin (FLOMAX) capsule 0.4 mg, 0.4 mg, Oral, Daily  sodium chloride flush 0.9 % injection 10 mL, 10 mL, Intravenous, 2 times per day  sodium chloride flush 0.9 % injection 10 mL, 10 mL, Intravenous, PRN  promethazine (PHENERGAN) tablet 12.5 mg, 12.5 mg, Oral, Q6H PRN **OR** ondansetron (ZOFRAN) injection 4 mg, 4 mg, Intravenous, Q6H PRN  potassium chloride 10 mEq/100 mL IVPB (Peripheral Line), 10 mEq, Intravenous, PRN  magnesium sulfate 2 g in 50 mL IVPB premix, 2 g, Intravenous, PRN  morphine (PF) injection 2 mg, 2 mg, Intravenous, Q3H PRN  lactated ringers infusion, , Intravenous, Continuous  Allergies:   [id number]     REVIEW OF SYSTEMS:    CONSTITUTIONAL:  negative for  fevers, chills and malaise  MUSCULOSKELETAL:  positive for  myalgias, arthralgias and pain  All other ROS reviewed in chart or with patient or family and are grossly negative. PHYSICAL EXAM:    VITALS:  /66   Pulse 76   Temp 98.3 °F (36.8 °C) (Oral)   Resp 18   Ht 6' (1.829 m)   Wt 155 lb 10.3 oz (70.6 kg)   SpO2 93%   BMI 21.11 kg/m²     MUSCULOSKELETAL:  bilateral foot NVI. Wiggles toes to command. Pedal pulses are palpable. Left leg shortened and externally rotated. Nontender right hip . Nontender bilateral knees or ankles. Nontender bilateral shoulder elbow and wrist. Able to move left shoulder well with FROM noted.    NEUROLOGIC:   Sensory:    Touch:                                   Right Lower Extremity: normal                  Left Lower Extremity:  normal  Skin warm and dry  Resp deep and easy  Abdomen soft and round  Pulse is with regular rate and rhythm    DATA:    CBC:   Lab Results   Component Value Date    WBC 14.8 09/29/2020    RBC 4.02 09/29/2020    HGB 12.9 09/29/2020    HCT 38.1 09/29/2020    MCV 94.9 09/29/2020    MCH 32.1 09/29/2020    MCHC 33.8 09/29/2020    RDW 13.7 09/29/2020     09/29/2020    MPV 9.2 09/29/2020     WBC:    Lab Results   Component Value Date    WBC 14.8 09/29/2020     PT/INR:    Lab Results   Component Value Date    PROTIME 13.0 09/29/2020    INR 1.12 09/29/2020     PTT:    Lab Results   Component Value Date    APTT 30.3 09/29/2020   [APTT  Radiology Review:    Narrative    EXAMINATION:    4 XRAY VIEWS OF THE LEFT FEMUR; ONE XRAY VIEW OF THE PELVIS AND TWO XRAY    VIEWS LEFT HIP         9/29/2020 7:47 pm         COMPARISON:    None.         HISTORY:    ORDERING SYSTEM PROVIDED HISTORY: fall    TECHNOLOGIST PROVIDED HISTORY:    Reason for exam:->fall    Reason for Exam: Fall; Leg Injury    Acuity: Acute    Type of Exam: Initial    Mechanism of Injury: Fall; Leg Injury         FINDINGS:    Pelvis with left hip: There is a comminuted, intertrochanteric fracture of    the left hip with valgus angulation.  No hip dislocation is identified.         Left femur: No fracture of the distal femur is visualized              Impression    Acute fracture of the left hip. IMPRESSION/RECOMMENDATIONS:    Fall  Left hip pain  Left IT hip fx  Plan left hip ORIF today per Dr Russell Han  NPO for surgery today  Pt prefers rehab unit postop, will consult.      Jennifer Gallardo  9/30/2020  10:36 AM

## 2020-09-30 NOTE — PROGRESS NOTES
Ortho note    Spoke with Elodia Arambula in ED    Left intertroch/subtroch hip fracture    Will benefit from left hip fracture fixation    NPO midnight    Will try to add on for surgery tomorrow (Wednesday 9/30/2020) pending medical clearance      Jassi Johnson

## 2020-09-30 NOTE — CARE COORDINATION
Referral per MD for d/c planning. Noted plans for hip fx repair today. Met with patient and daughter Akshat prescott. 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 He is hopeful for transfer to ARU here post -op. Family members are caring for wife in his absence and are looking into COA services. Will follow up post-op. Noted rehab consult has been ordered.    Electronically signed by Nathan Dalton on 9/30/2020 at 1:00 PM

## 2020-09-30 NOTE — BRIEF OP NOTE
Brief Postoperative Note      Patient: Sandie Carey  YOB: 1927  MRN: [id number]    Date of Procedure: 9/30/2020    Pre-Op Diagnosis:  Left hip fx    Post-Op Diagnosis: Same       Procedure(s):  LEFT HIP NAILING    Surgeon(s):  Radha Dubose MD    Assistant:  Surgical Assistant: Tiago Kapadia    Anesthesia: General    Estimated Blood Loss (mL): 726CL    Complications: None    Specimens:   * No specimens in log *    Implants:  Implant Name Type Inv.  Item Serial No.  Lot No. LRB No. Used Action   NAIL 10MM 130 DEG TI JARAD TFNA 235MM LT Screw/Plate/Nail/Ubaldo NAIL 65RM 130 DEG TI JARAD TFNA 235MM LT  SYNTHES [id number] Left 1 Implanted   IMPL BLADE HELICAL TFNA FEN ST 148BE Screw/Plate/Nail/Ubaldo IMPL BLADE HELICAL TFNA FEN ST 985TE  SYNTHES [id number] Left 1 Implanted   SCREW LK W/ T25 STARDRIVE [id number] Screw/Plate/Nail/Ubaldo SCREW LK W/ T25 STARDRIVE [id number]  [employer] [id number] Left 1 Implanted         Drains:   Urethral Catheter Straight-tip 18 fr (Active)   Catheter Indications Perioperative use in selected surgeries including but not limited to urologic, pelvic or need for intraoperative monitoring of urinary output due to prolonged surgery, large volume infusion or need for diuretic therapy in surgery 09/30/20 0822   Site Assessment No urethral drainage 09/30/20 0822   Urine Color Yellow 09/30/20 0822   Urine Appearance Clear 09/30/20 3799   Urine Odor Other (Comment) 09/30/20 0822   Output (mL) 300 mL 09/30/20 1225       Findings: comminuted fracture involving trochanter, IT region, with separate fracture line involving femoral neck    Electronically signed by Radha Dubose MD on 9/30/2020 at 7:25 PM

## 2020-09-30 NOTE — PLAN OF CARE
Problem: Pain:  Goal: Pain level will decrease  Description: Pain level will decrease  9/30/2020 0657 by Jarod Irving RN  Outcome: Ongoing  Note: Assessing and monitoring pt's pain. PRN pain medication being given if ordered. Educating pt on nonpharmacologic interventions for pain control. Will continue to monitor and reassess. Problem: Pain:  Goal: Control of acute pain  Description: Control of acute pain  9/30/2020 0657 by Jarod Irvnig RN  Outcome: Ongoing  Note: Assessing and monitoring pt's pain. PRN pain medication being given if ordered. Educating pt on nonpharmacologic interventions for pain control. Will continue to monitor and reassess. Problem: Pain:  Goal: Control of chronic pain  Description: Control of chronic pain  9/30/2020 0657 by Jarod Irving RN  Outcome: Ongoing  Note: Assessing and monitoring pt's pain. PRN pain medication being given if ordered. Educating pt on nonpharmacologic interventions for pain control. Will continue to monitor and reassess. Problem: Falls - Risk of:  Goal: Will remain free from falls  Description: Will remain free from falls  9/30/2020 0657 by Jarod Irving RN  Outcome: Ongoing  Note: Fall risk assessment completed. Fall precautions in place. Bed in lowest position, wheels locked, bed/chair exit alarm in place, call light within reach, and non skid footwear on. Walkway free of clutter. Pt alert and oriented and able to make needs known. Pt educated to use call light when needing to get up, and pt utilizes call light to make needs known. Will continue to monitor.         Problem: Falls - Risk of:  Goal: Absence of physical injury  Description: Absence of physical injury  9/30/2020 0657 by Jarod Irving RN  Outcome: Ongoing  Note: Pt absent from physical injury on this shift      Problem: Skin Integrity:  Goal: Will show no infection signs and symptoms  Description: Will show no infection signs and symptoms  9/30/2020 0657 by Amarjit Mcgowan

## 2020-09-30 NOTE — PROGRESS NOTES
Hospital Medicine Progress Note      Admit Date: 9/29/2020       CC: F/U for fall    HPI:  A 80nyearnold  male who presents  to the hospital, status post mechanical fall when he was turning around,  trying to grab on to something and lost his balance followed by  excruciating pain in the left hip and inability to get up from the  ground without any nausea, vomiting, fevers, or chills. No other  constitutional symptoms. No shortness or breath, chest pain, or chest  tightness. No nausea or vomiting. CT chest:  chronic right pleural effusion with pleural thickening and adjacent right lower lobe atelectasis, emphysema, chronic interstitial changes, stable left pulm nodules and pleural plaques. New hepatic metastatic disease. Right juxta cardiac adenopathy concerning for metastatic disease. Hiatal hernia with GERD    Has seen Dr. Aditya Amin in the past for Pulmonology for emphysema and right pleural effusion. Patient has hx fecal incontinence and has been seen by colorectal surgery, Dr. Tremayne Narayanan in the past.  No mass or polyps, moderately inflamed inflamed internal hemorrhoids     Interval History/Subjective: going to OR for left hip repair at 4pm today. CXR with airspace disease RLL; recommend CT. CT chest neg for PNA; chronic right pleural effusion with pleural thickening and adjacent right lower lobe atelectasis, emphysema, chronic interstitial changes, stable left pulm nodules and pleural plaques. New hepatic metastatic disease. Right juxta cardiac adenopathy concerning for metastatic disease. Hiatal hernia with GERD. Daughter in law at bedside explained he had seen GI MD previously for colonoscopy for hematochezia and they had seen a \"shadow\" of something somewhere and at one point discussed further testing, but MD thought it was \"too risky\". Consult placed to GI for new hepatic metastatic disease. Patient said he \"just lost my balance\" causing him to fall.  Has left hip pain \"only when I move it. \"     Review of Systems:     The patient denied headaches, visual changes, LOC, SOB, CP, ABD pain, N/V/D, skin changes, new or worsening weakness or neuromuscular deficits. Comprehensive ROS negative except as mentioned above. Past Medical History:        Diagnosis Date    Arthritis     BPH with obstruction/lower urinary tract symptoms     Dr. Brenda Hua Diverticular disease     Diverticulosis of sigmoid colon with h/o bleeding    Ectropion of right eye     Emphysema of lung (HCC)     PLASCENCIA    Hypercholesteremia     Macular degeneration     Dr. Jacinto Real Moderate mitral regurgitation 03/14/2017    with mild Aortic Regurge and diastolic dysfunction    PONV (postoperative nausea and vomiting)     Squamous cell skin cancer     Nose and face    Vitamin D deficiency     Wears glasses        Past Surgical History:        Procedure Laterality Date    CATARACT REMOVAL WITH IMPLANT Bilateral     COLONOSCOPY  06/20/2016    [facility]      Dr. Etta Muñoz       Allergies:  Erythromycin base; Fentanyl; Penicillins; and Versed [midazolam]    Past medical and surgical history reviewed. Any changes have been noted. PHYSICAL EXAM:  /66   Pulse 76   Temp 98.3 °F (36.8 °C) (Oral)   Resp 16   Ht 6' (1.829 m)   Wt 155 lb 10.3 oz (70.6 kg)   SpO2 93%   BMI 21.11 kg/m²       Intake/Output Summary (Last 24 hours) at 9/30/2020 0851  Last data filed at 9/30/2020 0840  Gross per 24 hour   Intake 0 ml   Output 1350 ml   Net -1350 ml        General appearance:   No apparent distress, appears stated age. Cooperative. HEENT:  Normocephalic, atraumatic. PERRLA. EOMi. Conjunctivae/corneas clear, no icterus, non-injected. Neck: Supple, with full range of motion. No jugular venous distention. Trachea midline. Respiratory:  Normal respiratory effort. Clear to auscultation, bilaterally without Rales/Wheezes/Rhonchi.   Cardiovascular:  Regular rate and rhythm without murmurs, rubs or gallops. Abdomen: Soft, non-tender, non-distended, without rebound or guarding. Normal bowel sounds. Musculoskeletal: left hip external rotation and shortening  Skin: Skin color, texture, turgor normal.  No rashes or lesions. Neurologic:  Neurovascularly intact without any focal sensory/motor deficits. Cranial nerves: II-XII intact, grossly intact. No facial asymmetry, tongue midline. Psychiatric:  Alert and oriented, thought content appropriate  Capillary Refill: Brisk,< 3 seconds   Peripheral Pulses: +2 palpable, equal bilaterally       LABS:    Lab Results   Component Value Date    WBC 14.8 (H) 09/29/2020    HGB 12.9 (L) 09/29/2020    HCT 38.1 (L) 09/29/2020    MCV 94.9 09/29/2020     09/29/2020    LYMPHOPCT 2.9 09/29/2020    RBC 4.02 (L) 09/29/2020    MCH 32.1 09/29/2020    MCHC 33.8 09/29/2020    RDW 13.7 09/29/2020       Lab Results   Component Value Date    CREATININE 1.1 09/29/2020    BUN 28 (H) 09/29/2020     (L) 09/29/2020    K 4.9 09/29/2020     09/29/2020    CO2 22 09/29/2020       No results found for: MG    Lab Results   Component Value Date    ALT 17 09/29/2020    AST 28 09/29/2020    ALKPHOS 116 09/29/2020    BILITOT 0.7 09/29/2020        No flowsheet data found. No results found for: LABA1C    Imaging:  CT CHEST WO CONTRAST   Final Result   1. No evidence of pneumonia   2. Chronic right pleural effusion with pleural thickening and adjacent right   lower lobe atelectasis   3. Emphysema, chronic interstitial changes, stable left pulmonary nodules and   pleural plaques   4. Right juxta cardiac adenopathy concerning for metastatic disease   5. New hepatic metastatic disease   6. Hiatal hernia with gastroesophageal reflux         CT Cervical Spine WO Contrast   Final Result   No acute abnormality of the cervical spine. CT Head WO Contrast   Final Result   No acute intracranial abnormality.          XR FEMUR LEFT (MIN 2 VIEWS)   Final Result   Acute fracture of the left hip.         XR HIP 2-3 VW W PELVIS LEFT   Final Result   Acute fracture of the left hip. XR CHEST PORTABLE   Final Result   Airspace disease in the right lower lung, most compatible with pneumonia,   with a small parapneumonic effusion. There is sharp margination of the right heart border, which is new when   compared to the previous exam, raising the possibility of lobar collapse. Consider further evaluation with chest CT. Scheduled and prn Medications:    Scheduled Meds:   budesonide-formoterol  2 puff Inhalation BID    tiotropium  2 puff Inhalation Daily    [START ON 10/1/2020] enoxaparin  40 mg Subcutaneous Daily    levothyroxine  50 mcg Oral Daily    pantoprazole  40 mg Oral QAM AC    tamsulosin  0.4 mg Oral Daily    sodium chloride flush  10 mL Intravenous 2 times per day     Continuous Infusions:   lactated ringers 100 mL/hr at 09/30/20 0018     PRN Meds:.sodium chloride flush, promethazine **OR** ondansetron, potassium chloride, magnesium sulfate, morphine    Assessment & Plan:        Post-traumatic intertrochanteric fracture left hip  - consult Ortho Surg  - NPO  - covid pre-op screening neg  - EKG: Sinus tachycardia with 1st degree A-V block with Fusion complexes;' Nonspecific ST and T wave  - PT/OT post-op   - social work consult for dispo needs  - analgesics PRN  - IVF while NPO     COPD  - cont home meds: trelegy  - O2 PRN; on RA  - CXR : airspace disease RLL; CT recommended by radiology  - WBC 14.8  - CT chest: no pneumonia; chronic right pleural effusion with pleural thickening and adjacent right lower lobe atelectasis, emphysema, chronic interstitial changes, stable left pulmonary nodules and pleural plaques    HTN  - cont home meds    HLD  - cont home meds    Thyroid dysfunction  - cont synthroid    Continue current regimen/therapies. Monitor. Adjust medical regimen as appropriate. Body mass index is 21.11 kg/m².     The patient and / or the family were informed of the results of any tests, a time was given to answer questions, a plan was proposed and they agreed with plan.       DVT ppx: lovenox  GI ppx: protonix    Diet: Diet NPO Time Specified  Dietary Nutrition Supplements: Standard High Calorie Oral Supplement    Consults:  IP CONSULT TO ORTHOPEDIC SURGERY    DISPO/placement plan: pending    Code Status: DNR-CCA      Corinne Freeman, APRN - JACQUELINE  09/30/20

## 2020-09-30 NOTE — PROGRESS NOTES
CT called and is aware that patient is to be picked up soon for left hip surgery. States that CT might have to be done tomorrow since surgery will take a few hours. Will continue to monitor and reassess.   Electronically signed by Prema Ellis RN on 9/30/2020 at 3:21 PM

## 2020-09-30 NOTE — PROGRESS NOTES
Pt off the floor at this time to go down to surgery. Daughter in law at bedside and walked down with the patient. Will continue to monitor and reassess.   Electronically signed by Sindi Nevarez RN on 9/30/2020 at 3:36 PM

## 2020-09-30 NOTE — PROGRESS NOTES
4 Eyes Admission Assessment     I agree as the admission nurse that 2 RN's have performed a thorough Head to Toe Skin Assessment on the patient. ALL assessment sites listed below have been assessed on admission. Areas assessed by both nurses:  [x]   Head, Face, and Ears   [x]   Shoulders, Back, and Chest  [x]   Arms, Elbows, and Hands   [x]   Coccyx, Sacrum, and Ischum  [x]   Legs, Feet, and Heels        Does the Patient have Skin Breakdown?   No         Jethro Prevention initiated:  NA   Wound Care Orders initiated:  NA      WOC nurse consulted for Pressure Injury (Stage 3,4, Unstageable, DTI, NWPT, and Complex wounds):  NA      Nurse 1 eSignature: Electronically signed by Otilio Collins RN on 9/30/20 at 12:04 AM EDT    **SHARE this note so that the co-signing nurse is able to place an eSignature**    Nurse 2 eSignature: Electronically signed by Riley Sung RN on 9/30/20 at 12:53 AM EDT

## 2020-09-30 NOTE — TELEPHONE ENCOUNTER
Lynnette with [facility] is calling regarding a sx and would like to know what vendor the doctor wants to use.   [phone]

## 2020-10-01 ENCOUNTER — APPOINTMENT (OUTPATIENT)
Dept: CT IMAGING | Age: 85
DRG: 481 | End: 2020-10-01
Payer: MEDICARE

## 2020-10-01 LAB
ANION GAP SERPL CALCULATED.3IONS-SCNC: 10 MMOL/L (ref 3–16)
BUN BLDV-MCNC: 26 MG/DL (ref 7–20)
CALCIUM SERPL-MCNC: 8.5 MG/DL (ref 8.3–10.6)
CHLORIDE BLD-SCNC: 102 MMOL/L (ref 99–110)
CO2: 25 MMOL/L (ref 21–32)
CREAT SERPL-MCNC: 1.3 MG/DL (ref 0.8–1.3)
GFR AFRICAN AMERICAN: >60
GFR NON-AFRICAN AMERICAN: 51
GLUCOSE BLD-MCNC: 159 MG/DL (ref 70–99)
HCT VFR BLD CALC: 30 % (ref 40.5–52.5)
HEMOGLOBIN: 10.2 G/DL (ref 13.5–17.5)
MCH RBC QN AUTO: 31.8 PG (ref 26–34)
MCHC RBC AUTO-ENTMCNC: 34 G/DL (ref 31–36)
MCV RBC AUTO: 93.6 FL (ref 80–100)
PDW BLD-RTO: 13.3 % (ref 12.4–15.4)
PLATELET # BLD: 177 K/UL (ref 135–450)
PMV BLD AUTO: 8.7 FL (ref 5–10.5)
POTASSIUM SERPL-SCNC: 4.7 MMOL/L (ref 3.5–5.1)
RBC # BLD: 3.21 M/UL (ref 4.2–5.9)
SODIUM BLD-SCNC: 137 MMOL/L (ref 136–145)
WBC # BLD: 10.6 K/UL (ref 4–11)

## 2020-10-01 PROCEDURE — 6360000002 HC RX W HCPCS: Performed by: ORTHOPAEDIC SURGERY

## 2020-10-01 PROCEDURE — 2700000000 HC OXYGEN THERAPY PER DAY

## 2020-10-01 PROCEDURE — 99024 POSTOP FOLLOW-UP VISIT: CPT | Performed by: ORTHOPAEDIC SURGERY

## 2020-10-01 PROCEDURE — 97116 GAIT TRAINING THERAPY: CPT

## 2020-10-01 PROCEDURE — 97530 THERAPEUTIC ACTIVITIES: CPT

## 2020-10-01 PROCEDURE — 6360000004 HC RX CONTRAST MEDICATION: Performed by: NURSE PRACTITIONER

## 2020-10-01 PROCEDURE — 94640 AIRWAY INHALATION TREATMENT: CPT

## 2020-10-01 PROCEDURE — 97166 OT EVAL MOD COMPLEX 45 MIN: CPT

## 2020-10-01 PROCEDURE — 97162 PT EVAL MOD COMPLEX 30 MIN: CPT

## 2020-10-01 PROCEDURE — 80048 BASIC METABOLIC PNL TOTAL CA: CPT

## 2020-10-01 PROCEDURE — 94761 N-INVAS EAR/PLS OXIMETRY MLT: CPT

## 2020-10-01 PROCEDURE — 74177 CT ABD & PELVIS W/CONTRAST: CPT

## 2020-10-01 PROCEDURE — 6370000000 HC RX 637 (ALT 250 FOR IP): Performed by: INTERNAL MEDICINE

## 2020-10-01 PROCEDURE — 1200000000 HC SEMI PRIVATE

## 2020-10-01 PROCEDURE — 85027 COMPLETE CBC AUTOMATED: CPT

## 2020-10-01 PROCEDURE — 2580000003 HC RX 258: Performed by: ANESTHESIOLOGY

## 2020-10-01 PROCEDURE — 36415 COLL VENOUS BLD VENIPUNCTURE: CPT

## 2020-10-01 RX ORDER — HYDROCODONE BITARTRATE AND ACETAMINOPHEN 5; 325 MG/1; MG/1
1 TABLET ORAL EVERY 6 HOURS PRN
Qty: 20 TABLET | Refills: 0 | Status: ON HOLD | OUTPATIENT
Start: 2020-10-01 | End: 2020-10-07

## 2020-10-01 RX ORDER — MORPHINE SULFATE 2 MG/ML
2 INJECTION, SOLUTION INTRAMUSCULAR; INTRAVENOUS EVERY 4 HOURS PRN
Status: DISCONTINUED | OUTPATIENT
Start: 2020-10-01 | End: 2020-10-04 | Stop reason: HOSPADM

## 2020-10-01 RX ORDER — ASPIRIN 81 MG/1
81 TABLET ORAL 2 TIMES DAILY
Qty: 60 TABLET | Refills: 0 | Status: ON HOLD | OUTPATIENT
Start: 2020-10-01 | End: 2020-10-07 | Stop reason: SDUPTHER

## 2020-10-01 RX ORDER — IPRATROPIUM BROMIDE AND ALBUTEROL SULFATE 2.5; .5 MG/3ML; MG/3ML
1 SOLUTION RESPIRATORY (INHALATION) EVERY 4 HOURS PRN
Status: DISCONTINUED | OUTPATIENT
Start: 2020-10-01 | End: 2020-10-04 | Stop reason: HOSPADM

## 2020-10-01 RX ORDER — HYDROCODONE BITARTRATE AND ACETAMINOPHEN 5; 325 MG/1; MG/1
1 TABLET ORAL EVERY 6 HOURS PRN
Status: DISCONTINUED | OUTPATIENT
Start: 2020-10-01 | End: 2020-10-04 | Stop reason: HOSPADM

## 2020-10-01 RX ADMIN — TAMSULOSIN HYDROCHLORIDE 0.4 MG: 0.4 CAPSULE ORAL at 07:53

## 2020-10-01 RX ADMIN — CEFAZOLIN SODIUM 2 G: 10 INJECTION, POWDER, FOR SOLUTION INTRAVENOUS at 15:40

## 2020-10-01 RX ADMIN — CEFAZOLIN SODIUM 2 G: 10 INJECTION, POWDER, FOR SOLUTION INTRAVENOUS at 06:59

## 2020-10-01 RX ADMIN — SODIUM CHLORIDE: 9 INJECTION, SOLUTION INTRAVENOUS at 06:59

## 2020-10-01 RX ADMIN — PANTOPRAZOLE SODIUM 40 MG: 40 TABLET, DELAYED RELEASE ORAL at 05:58

## 2020-10-01 RX ADMIN — ENOXAPARIN SODIUM 40 MG: 40 INJECTION SUBCUTANEOUS at 07:53

## 2020-10-01 RX ADMIN — SODIUM CHLORIDE: 9 INJECTION, SOLUTION INTRAVENOUS at 20:30

## 2020-10-01 RX ADMIN — BUDESONIDE AND FORMOTEROL FUMARATE DIHYDRATE 2 PUFF: 80; 4.5 AEROSOL RESPIRATORY (INHALATION) at 08:19

## 2020-10-01 RX ADMIN — BUDESONIDE AND FORMOTEROL FUMARATE DIHYDRATE 2 PUFF: 80; 4.5 AEROSOL RESPIRATORY (INHALATION) at 20:13

## 2020-10-01 RX ADMIN — LEVOTHYROXINE SODIUM 50 MCG: 0.05 TABLET ORAL at 05:58

## 2020-10-01 RX ADMIN — TIOTROPIUM BROMIDE INHALATION SPRAY 2 PUFF: 3.12 SPRAY, METERED RESPIRATORY (INHALATION) at 08:19

## 2020-10-01 RX ADMIN — IOPAMIDOL 75 ML: 755 INJECTION, SOLUTION INTRAVENOUS at 11:20

## 2020-10-01 ASSESSMENT — PAIN DESCRIPTION - PROGRESSION
CLINICAL_PROGRESSION: NOT CHANGED

## 2020-10-01 ASSESSMENT — PAIN SCALES - GENERAL: PAINLEVEL_OUTOF10: 0

## 2020-10-01 NOTE — ANESTHESIA POSTPROCEDURE EVALUATION
Department of Anesthesiology  Postprocedure Note    Patient: Paulette Davila  MRN: [id number]  YOB: 1927  Date of evaluation: 9/30/2020  Time:  8:08 PM     Procedure Summary     Date:  09/30/20 Room / Location:  [facility]    Anesthesia Start:  1754 Anesthesia Stop:  1925    Procedure:  LEFT HIP NAILING (Left Hip) Diagnosis:  (.)    Surgeon:  Ree Bonilla MD Responsible Provider:  Teri Abreu MD    Anesthesia Type:  general ASA Status:  3          Anesthesia Type: general    Millie Phase I: Millie Score: 9    Millie Phase II:      Last vitals: Reviewed and per EMR flowsheets.        Anesthesia Post Evaluation    Patient location during evaluation: bedside  Patient participation: complete - patient participated  Level of consciousness: awake  Pain score: 2  Airway patency: patent  Nausea & Vomiting: no nausea and no vomiting  Complications: no  Cardiovascular status: blood pressure returned to baseline  Respiratory status: acceptable  Hydration status: euvolemic

## 2020-10-01 NOTE — PROGRESS NOTES
Occupational Therapy   Occupational Therapy Initial Assessment  Date: 10/1/2020   Patient Name: Iris Childs  MRN: [id number]     : 1927    Date of Service: 10/1/2020    Assessment: Pt is 80 y.o. M who presents s/p mechanical fall in kitchen resulting in closed pertrochanteric fracture of L femur. Pt is s/p L hip nailing and is 25% WBing. PTA pt lives with wife in one story home with 2 FINN. Pt reports he is wife's caregiver (does not have to physically assist but has to contreras her around and find her cane for her). Pt reports independence in self-care tasks, light homemaking responsibilities, and functional mobility with no device. Currently, pt presents with ROM/strength in [facility] for self-care and transfers. Pt completed bed mobility with mod Ax2 and sit <> stand transfers with min/mod Ax2. Pt does require some assist to maintain 25% WBing with cues. Anticipate pt to require mod/max A for ADL needs at this time. Pt is unsafe to d/c home at this time d/t level of assistance required. Pt and family were hopeful for IP rehab to heal quickly so he can get home and care for wife. However pt and wife will likely require outside 24/7 supervision/assist even after d/c from rehab. Pt tolerated very little today and required quick sit and check of BP following slightly syncopal episode. Will continue to assess for appropriate discharge disposition pending pt progress with therapy - at this time high frequency therapy would be difficult for patient. Discharge Recommendations:  Continue to assess pending progress, Patient would benefit from continued therapy after discharge       Assessment   Performance deficits / Impairments: Decreased endurance;Decreased strength;Decreased functional mobility ; Decreased ADL status; Decreased balance  Assessment: Pt is 80 y.o. M who presents s/p mechanical fall in kitchen resulting in closed pertrochanteric fracture of L femur. Pt is s/p L hip nailing and is 25% WBing.  PTA pt lives with wife in one story home with 2 FINN. Pt reports he is wife's caregiver (does not have to physically assist but has to contreras her around and find her cane for her). Pt reports independence in self-care tasks, light homemaking responsibilities, and functional mobility with no device. Currently, pt presents with ROM/strength in [facility] for self-care and transfers. Pt completed bed mobility with mod Ax2 and sit <> stand transfers with min/mod Ax2. Pt does require some assist to maintain 25% WBing with cues. Anticipate pt to require mod/max A for ADL needs at this time. Pt is unsafe to d/c home at this time d/t level of assistance required. Pt and family were hopeful for IP rehab to heal quickly so he can get home and care for wife. However pt and wife will likely require outside 24/7 supervision/assist even after d/c from rehab. Pt tolerated very little today and required quick sit and check of BP following slightly syncopal episode. Will continue to assess for appropriate discharge disposition pending pt progress with therapy - at this time high frequency therapy would be difficult for patient. Prognosis: Fair;Good  Decision Making: Medium Complexity  History: PMH: Emphysema of lung, skin Ca  Exam: ADLs, transfers, func mob, bed mob  Assistance / Modification: min/mod Ax2 for mobility, mod/max A for ADLs  OT Education: OT Role;Transfer Training;Plan of Care;Precautions; ADL Adaptive Strategies  REQUIRES OT FOLLOW UP: Yes  Activity Tolerance  Activity Tolerance: Patient limited by pain  Activity Tolerance: Pt became light headed and dizzy upon arrival to chair; required assist to sit quickly and bring LEs up. Pt /66, . O2 sats 84%. RN aware increased O2 to 4L to achieve 92%. Safety Devices  Safety Devices in place: (RN Winston Pulliam) notified)  Type of devices: Nurse notified;Gait belt;Call light within reach; Chair alarm in place; Left in chair           Patient Diagnosis(es): The primary encounter diagnosis was Closed nondisplaced intertrochanteric fracture of left femur, initial encounter ([address] Diagnoses of Hepatic metastases ([address] Mediastinal adenopathy, and Pulmonary emphysema, unspecified emphysema type ([address] were also pertinent to this visit. has a past medical history of Arthritis, BPH with obstruction/lower urinary tract symptoms, Diverticular disease, Ectropion of right eye, Emphysema of lung ([address] Hypercholesteremia, Macular degeneration, Moderate mitral regurgitation, PONV (postoperative nausea and vomiting), Squamous cell skin cancer, Vitamin D deficiency, and Wears glasses. has a past surgical history that includes Mohs surgery; Cataract removal with implant (Bilateral); and Colonoscopy (06/20/2016). Restrictions  Restrictions/Precautions  Restrictions/Precautions: Fall Risk, Weight Bearing  Lower Extremity Weight Bearing Restrictions  Left Lower Extremity Weight Bearing: Partial Weight Bearing  Partial Weight Bearing Percentage Or Pounds: 25% WBing  Position Activity Restriction  Other position/activity restrictions: 25% PWB left LE    Subjective   General  Chart Reviewed: Yes  Patient assessed for rehabilitation services?: Yes  Additional Pertinent Hx: Pt is 80 y.o. M who presents s/p mechanical fall in kitchen resulting in closed pertrochanteric fracture of L femur. Pt is s/p L hip nailing and is 25% WBing. PMH: Emphysema of lung, skin Ca  Family / Caregiver Present: No  Referring Practitioner: Robin Dupree MD  Diagnosis: Closed pertrochanteric fracture of L femur  Subjective  Subjective: Pt met bedside, agreeable for therapy evaluation and OOB activity.   Patient Currently in Pain: (Wincing in pain with movement - reports moderate, min pain at rest)  Vital Signs  Temp: 97.7 °F (36.5 °C)  Temp Source: Oral  Pulse: 89  Heart Rate Source: Monitor  Resp: 16  BP: (!) 93/57  BP Location: Right Arm  BP Upper/Lower: Upper  MAP (mmHg): 69  Level of Consciousness: Alert  Patient Currently in Pain: (Wincing in pain with movement - reports moderate, min pain at rest)  Oxygen Therapy  SpO2: 91 %  Pulse Oximeter Device Mode: Intermittent  Pulse Oximeter Device Location: Finger  O2 Device: Nasal cannula  O2 Flow Rate (L/min): 3 L/min  Patient Observation  Observations: Rinsed post MDI     Social/Functional History  Social/Functional History  Lives With: Spouse  Type of Home: House  Home Layout: One level  Home Access: Stairs to enter with rails  Entrance Stairs - Number of Steps: 2 FINN  Entrance Stairs - Rails: Left  Bathroom Shower/Tub: Walk-in shower, Shower chair with back  Bathroom Toilet: Standard  Bathroom Equipment: Grab bars in shower, Grab bars around toilet  Bathroom Accessibility: Walker accessible  Home Equipment: [employer], Standard walker, Cane, Alert [employer] Help From: (8 Children -  6 are in town)  ADL Assistance: Independent  Homemaking Assistance: (Light meal prep, pt does laundry)  Ambulation Assistance: Independent  Transfer Assistance: Independent  Active : Yes(no night driving)  Occupation: Retired  Type of occupation: [employer]  Additional Comments: Denies other recent falls       Objective   Vision: Impaired  Vision Exceptions: Wears glasses at all times  Hearing: Within functional limits      Orientation  Overall Orientation Status: Within Functional Limits     Balance  Sitting Balance: Contact guard assistance  Standing Balance: (min/mod A at [employer])  Standing Balance  Time: ~2-3 minutes  Activity: Func mob, transfers    Functional Mobility  Functional - Mobility Device: Rolling Walker  Activity: Other  Assist Level: (Min Ax2)  Functional Mobility Comments: Pt completed functional mobility with RW with min Ax2, assist to maintain 25%WBing & assist to manage trunk. ADL  LE Dressing: (Assist for socks)  Toileting: (Catheter in place)  Additional Comments: PTA pt reports independence in self-care tasks.  Anticipate pt to require mod/max A for LB activity to increase strength and activity tolerance for self-care and transfers  Patient Goals   Patient goals : \"to get better and get home\"       Therapy Time   Individual Concurrent Group Co-treatment   Time In       0855   Time Out       0940   Minutes       45   Timed Code Treatment Minutes: 30 Minutes(15 min eval)     If pt is discharged prior to next OT session, this note will serve as the discharge summary.     [facility], MENDOZA/W#[id number]

## 2020-10-01 NOTE — PLAN OF CARE
Problem: Pain:  Goal: Pain level will decrease  Description: Pain level will decrease  10/1/2020 0711 by Madeleine Middleton RN  Outcome: Ongoing  Note: Assessing and monitoring pt's pain. PRN pain medication being given if ordered. Educating pt on nonpharmacologic interventions for pain control. Will continue to monitor and reassess. Problem: Pain:  Goal: Control of acute pain  Description: Control of acute pain  10/1/2020 0711 by Madeleine Middleton RN  Outcome: Ongoing  Note: Assessing and monitoring pt's pain. PRN pain medication being given if ordered. Educating pt on nonpharmacologic interventions for pain control. Will continue to monitor and reassess. Problem: Pain:  Goal: Control of chronic pain  Description: Control of chronic pain  10/1/2020 0711 by Madeleine Middleton RN  Outcome: Ongoing  Note: Assessing and monitoring pt's pain. PRN pain medication being given if ordered. Educating pt on nonpharmacologic interventions for pain control. Will continue to monitor and reassess. Problem: Falls - Risk of:  Goal: Will remain free from falls  Description: Will remain free from falls  10/1/2020 0711 by Madeleine Middleton RN  Outcome: Ongoing  Note: Fall risk assessment completed. Fall precautions in place. Bed in lowest position, wheels locked, bed/chair exit alarm in place, call light within reach, and non skid footwear on. Walkway free of clutter. Pt alert and oriented and able to make needs known. Pt educated to use call light when needing to get up, and pt utilizes call light to make needs known. Will continue to monitor.         Problem: Falls - Risk of:  Goal: Absence of physical injury  Description: Absence of physical injury  10/1/2020 0711 by Madeleine Middleton RN  Outcome: Ongoing  Note: Pt absent from physical injury      Problem: Skin Integrity:  Goal: Absence of new skin breakdown  Description: Absence of new skin breakdown  10/1/2020 0711 by Madeleine Middleton RN  Outcome: Ongoing  Note: Pt absent from new skin breakdown      Problem: Skin Integrity:  Goal: Will show no infection signs and symptoms  Description: Will show no infection signs and symptoms  10/1/2020 0711 by Preet Davenport RN  Outcome: Ongoing  Note: Monitoring pt for signs of infection

## 2020-10-01 NOTE — PROGRESS NOTES
Pt arrived from surgery, AAO4 able to make needs known, call light in reach. Vitals signs are stable.   will continue to monitor

## 2020-10-01 NOTE — PROGRESS NOTES
Nieves removed at this time. Pt tolerated well. Stressed the importance of letting staff know when he has to void. Pt verbalized understanding. Dr. Litzy Conner now at bedside. Will continue to monitor and reassess.   Electronically signed by Janet Orantes RN on 10/1/2020 at 5:16 PM

## 2020-10-01 NOTE — PROGRESS NOTES
Physical Therapy    Facility/Department: [facility] 3 ORTHOPEDICS  Initial Assessment    NAME: Brittaney Mcfadden  : 1927  MRN: [id number]    Date of Service: 10/1/2020    Discharge Recommendations:  -good effort demonstrated with first time OOB (became lightheaded with attempting PWB on rolling walker bed >>recliner)  -may need a slow pace for continued PT OT and nursing care  -do not know the time duration for 25%PWB Left LE (when will be progressed to greater weight bearing?)        Assessment   Body structures, Functions, Activity limitations: Decreased functional mobility ; Decreased ADL status; Decreased balance  Prognosis: Good  Decision Making: Medium Complexity  REQUIRES PT FOLLOW UP: Yes  Activity Tolerance  Activity Tolerance: managed well overall but with episode of lightheadedness with the effort to manage PWB       Patient Diagnosis(es): The primary encounter diagnosis was Closed nondisplaced intertrochanteric fracture of left femur, initial encounter ([address] Diagnoses of Hepatic metastases ([address] Mediastinal adenopathy, and Pulmonary emphysema, unspecified emphysema type ([address] were also pertinent to this visit. has a past medical history of Arthritis, BPH with obstruction/lower urinary tract symptoms, Diverticular disease, Ectropion of right eye, Emphysema of lung ([address] Hypercholesteremia, Macular degeneration, Moderate mitral regurgitation, PONV (postoperative nausea and vomiting), Squamous cell skin cancer, Vitamin D deficiency, and Wears glasses. has a past surgical history that includes Mohs surgery; Cataract removal with implant (Bilateral); and Colonoscopy (2016).     Restrictions  Restrictions/Precautions  Restrictions/Precautions: Fall Risk, Weight Bearing  Lower Extremity Weight Bearing Restrictions  Left Lower Extremity Weight Bearing: Partial Weight Bearing  Partial Weight Bearing Percentage Or Pounds: 25% WBing  Position Activity Restriction  Other position/activity restrictions: 25% PWB left LE  Vision/Hearing  Vision: Impaired  Vision Exceptions: Wears glasses at all times  Hearing: Within functional limits     Subjective  General  Chart Reviewed: Yes  Patient assessed for rehabilitation services?: Yes  Additional Pertinent Hx: here due to fall at home yielding left hip fracture  Response To Previous Treatment: Not applicable  Family / Caregiver Present: No  Follows Commands: Within Functional Limits  Subjective  Subjective: arrived to room along with OT- patient alert oriented -completing breakfast- agreeable to PT OT assessments and OOB to attempt ambulation on rolling walker at 25% PWB left  Pain Screening  Patient Currently in Pain: (Wincing in pain with movement - reports moderate, min pain at rest)  Orientation  Orientation  Overall Orientation Status: Within Functional Limits  Social/Functional History  Social/Functional History  Lives With: Spouse  Type of Home: House  Home Layout: One level  Home Access: Stairs to enter with rails  Entrance Stairs - Number of Steps: 2 FINN  Entrance Stairs - Rails: Left  Bathroom Shower/Tub: Walk-in shower, Shower chair with back  Bathroom Toilet: Standard  Bathroom Equipment: Grab bars in shower, Grab bars around toilet  Bathroom Accessibility: Walker accessible  Home Equipment: [employer], Standard walker, Cane, Alert [employer] Help From: (8 Children -  6 are in town)  ADL Assistance: Independent  Homemaking Assistance: (Light meal prep, pt does laundry)  Ambulation Assistance: Independent  Transfer Assistance: Independent  Active : Yes(no night driving)  Occupation: Retired  Type of occupation: [employer]  Additional Comments: Denies other recent falls  Cognition   Cognition  Overall Cognitive Status: WFL  Objective  Motor Control  Gross Motor?: WFL  Sensation  Overall Sensation Status: WFL  Bed mobility  Supine to Sit: Moderate assistance;2 Person assistance  Sit to Supine: Unable to assess  Transfers  Sit to Stand: Minimal Assistance; Moderate Assistance;2 Person Assistance(second assist to assure PWB maintained)  Stand to sit: Minimal Assistance; Moderate Assistance;2 Person Assistance  Ambulation  Ambulation?: Yes  Ambulation 1  Surface: level tile  Device: Rolling Walker  Assistance: Minimal assistance;2 Person assistance  Quality of Gait: managed several hop to efforts but then needed to just slide right foot forward  Distance: bed to recliner ~4 feet  Comments: became lightheaded (woozy) as attempting to back up a step or two- required increased assist to quickly sit to recliner - had a momentary lack of response to voice but no LOC   -VSS when assessed in reclined position - nursing informed  Balance  Comments: midline in sitting at the EOB once both feet situated to the floor with close SBA   - near midline in static stance on walker with min assist to maintain  Exercises  Ankle Pumps: 30 per hour in easy pace  Comments: encouraged practice with the spirometer as instructed by RT   Plan   Plan  Times per week: qd x1 then 3-5 while on acute floor  Safety Devices  Type of devices:  All fall risk precautions in place, Call light within reach, Chair alarm in place, Left in chair, Nurse notified(Dannielle)    AM-PAC Score  AM-PAC Inpatient Mobility Raw Score : 10 (10/01/20 0935)  AM-PAC Inpatient T-Scale Score : 32.29 (10/01/20 0935)  Mobility Inpatient CMS 0-100% Score: 76.75 (10/01/20 0935)  Mobility Inpatient CMS G-Code Modifier : CL (10/01/20 0935)    Goals  Short term goals  Time Frame for Short term goals: 1-2 days  Short term goal 1: bed mobility at mod/max assist  Short term goal 2: transfers at min/mod assist  Short term goal 3: ambulation at min assist of 2---  PWB 25% on left rolling walker for 5-6 feet  Patient Goals   Patient goals : wants to get home ASAP       Therapy Time   Individual Concurrent Group Co-treatment   Time In       [address]   Time Out       0950   Minutes       [address], PT

## 2020-10-01 NOTE — PROGRESS NOTES
Patient is alert & oriented x4, pt has not gotten out of bed yet, 2/4 bed rails up, bed in lowest position, fall precautions in place, call light within reach. Nieves draining yellow urine. Pt states pain is minimal at rest but does increase with movement however, has improved comparing to before surgery. Breakfast tray ordered for this morning. Morning assessment complete. Morning medications given. Will cont to monitor and reassess.   Electronically signed by Janet Orantes RN on 10/1/2020 at 8:02 AM

## 2020-10-01 NOTE — PROGRESS NOTES
[facility]    Patient seen and examined. Full dictated note to follow    The patient is a 55-year-old male who was admitted to the hospital after a mechanical fall resulting in a left intertrochanteric femur fracture and left femoral neck fracture. He underwent an open reduction and internal fixation with a cephalo-medullary nail. During the course of his evaluation he had a scan of the chest without contrast as part of his trauma work-up. This study dated 9/29/2020 showed interval enlargement of right cardio supra diaphragmatic lymph nodes there were also multiple new hepatic metastatic lesions. A CT scan of the abdomen and pelvis was done today which also confirmed a new enlarged right pericardiophrenic lymph node and an incompletely healed compression fracture at L4. Gastroenterology saw the patient and recommended a liver biopsy. I met at length with the patient today to talk about the fact that the radiographic findings are indeed suggestive of probable metastatic cancer. We talked about the pros and cons of a biopsy and during the course of our discussion the patient made it clear that he was DNR and that if the cancer was diagnosed he would be unwilling to go through any kind of chemotherapy or immunotherapy. He would opt for best supportive care. That said, he is requesting that I speak to his daughter-in-law Arabella Green to confirm. She is his medical power of . I did remind him, however, that the medical power of  only gets engaged when the patient is unable to make a decision for the himself. The patient is very cogent and is an excellent communicator. Patient could not provide me with a phone number to this person. At this point I would not recommend any biopsy and would continue to focus on recovery from his hip. The patient's goal is to return back home. He lives with his 66-year-old wife. They were going to likely need home care.   Hospice services could be considered if that would offer him a better alternative. I will put in a consult to palliative care. Hannah Hutton. Kendall Hein M.D., MPH  Co-Chair, Department of  Clinical [facility], [address] [place] [address]  Office [phone]  [place] [phone]. [email or web address]    \"Participating in a clinical trial is the first step to fighting cancer; not the last.\"

## 2020-10-01 NOTE — PROGRESS NOTES
MCG/ACT inhaler 2 puff, 2 puff, Inhalation, BID  tiotropium (SPIRIVA RESPIMAT) 2.5 MCG/ACT inhaler 2 puff, 2 puff, Inhalation, Daily  0.9 % sodium chloride infusion, , Intravenous, Continuous  albuterol sulfate  (90 Base) MCG/ACT inhaler 2 puff, 2 puff, Inhalation, Q6H PRN  levothyroxine (SYNTHROID) tablet 50 mcg, 50 mcg, Oral, Daily  pantoprazole (PROTONIX) tablet 40 mg, 40 mg, Oral, QAM AC  tamsulosin (FLOMAX) capsule 0.4 mg, 0.4 mg, Oral, Daily  sodium chloride flush 0.9 % injection 10 mL, 10 mL, Intravenous, 2 times per day  sodium chloride flush 0.9 % injection 10 mL, 10 mL, Intravenous, PRN  promethazine (PHENERGAN) tablet 12.5 mg, 12.5 mg, Oral, Q6H PRN **OR** ondansetron (ZOFRAN) injection 4 mg, 4 mg, Intravenous, Q6H PRN  potassium chloride 10 mEq/100 mL IVPB (Peripheral Line), 10 mEq, Intravenous, PRN  magnesium sulfate 2 g in 50 mL IVPB premix, 2 g, Intravenous, PRN  lactated ringers infusion, , Intravenous, Continuous    ASSESSMENT AND PLAN    Fall  Left hip fx, IT and femoral neck  Left hip CMN nail and accessory cannulated screw per DR Bishnu Gan, stable exam  Hepatic lesions , GI following, bx today  PT OT eval and treat 25% WB right leg  SS for DC planning  Lovenox as inpt after liver bx then ASA 81mg twice at day for 30 days for DVT prophylaxis      Walt Do  10/1/2020  10:53 AM         Bishnu Gan Addendum:  Edits made to note above  I have seen and examined Mr Wade Bah at bedside  He is doing well  No pain at present  Denies N/T    SILT SP/DP/T/sural/saphenous nerve distributions; EHL/FHL/TA/GS intact    Tico Farrar

## 2020-10-01 NOTE — CONSULTS
Consult Note  Physical Medicine and Rehabilitation    Patient: Kate oLng  [id number]  Date: 10/1/2020      Chief Complaint: left hip pain    History of Present Illness/Hospital Course:  Patient is a 81 yo M with pmh HTN, HLD, COPD, chronic R pleural effusion, and BPH who initially presented 9/29/2020 with left hip pain s/p mechanical fall. Patient reported losing his balance while turning. Found to have left intertrochanteric femur fracture and left femoral neck fracture. Underwent ORIF with cephalomedullary nail (9/30 with Dr. Emanuel Sykes). Now 25% WB on LLE. Course complicated by incidental finding of hepatic lesions, concerning for metastatic disease. GI has been consulted for biopsy. Currently, patient reports mild left hip pain (overall improved since surgery). Worse with movement and partial weight bearing. Improves with rest. He denies any focal weakness or tingling/numbness. He is interested in coming to ARU to improve his function prior to returning home. He is very curious as to whether or not he has cancer and where it might be coming from. However he indicates to me that he does not think he will pursue treatment.       Prior Level of Function:  Independent for mobility, ADLs, and IADLs    Current Level of Function:  Min-modA x 2 person for mobility  Mod-maxA for ADLs    Pertinent Social History:  Support: Lives with spouse  Home set-up: 1 level with 2 steps to enter    Past Medical History:   Diagnosis Date    Arthritis     BPH with obstruction/lower urinary tract symptoms     Dr. Swati Osborne    Diverticular disease     Diverticulosis of sigmoid colon with h/o bleeding    Ectropion of right eye     Emphysema of lung (HCC)     PLASCENCIA    Hypercholesteremia     Macular degeneration     Dr. Mirela Almaraz Moderate mitral regurgitation 03/14/2017    with mild Aortic Regurge and diastolic dysfunction    PONV (postoperative nausea and vomiting)     Squamous cell skin cancer     Nose and face    Vitamin D deficiency     Wears glasses        Past Surgical History:   Procedure Laterality Date    CATARACT REMOVAL WITH IMPLANT Bilateral     COLONOSCOPY  2016   [facility]      Dr. 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/   Social Needs    Financial resource strain: None    Food insecurity     Worry: None     Inability: None    Transportation needs     Medical: None     Non-medical: None   Tobacco Use    Smoking status: Former Smoker    Smokeless tobacco: Never Used    Tobacco comment: quit 50 years ago   Substance and Sexual Activity    Alcohol use:  Yes     Alcohol/week: 0.0 standard drinks     Comment: infrequently    Drug use: No    Sexual activity: Never   Lifestyle    Physical activity     Days per week: None     Minutes per session: None    Stress: None   Relationships    Social connections     Talks on phone: None     Gets together: None     Attends [employer] service: None     Active member of club or organization: None     Attends meetings of clubs or organizations: None     Relationship status: None    Intimate partner violence     Fear of current or ex partner: None     Emotionally abused: None     Physically abused: None     Forced sexual activity: None   Other Topics Concern    None   Social History Narrative    None           REVIEW OF SYSTEMS:   CONSTITUTIONAL: negative for fevers, chills, diaphoresis, appetite change, night sweats, unexpected weight change, fatigue  EYES: negative for blurred vision, eye discharge, visual disturbance and icterus  HEENT: negative for hearing loss, tinnitus, ear drainage, sinus pressure, nasal congestion, epistaxis and snoring  RESPIRATORY: Negative for hemoptysis, cough, sputum production  CARDIOVASCULAR: negative for chest pain, palpitations, exertional chest pressure/discomfort, syncope, edema  GASTROINTESTINAL: negative for nausea, vomiting, diarrhea, blood in stool, abdominal pain, constipation  GENITOURINARY: negative for frequency, dysuria, urinary incontinence, decreased urine volume, and hematuria  HEMATOLOGIC/LYMPHATIC: negative for easy bruising, bleeding and lymphadenopathy  ALLERGIC/IMMUNOLOGIC: negative for recurrent infections, angioedema, anaphylaxis and drug reactions  ENDOCRINE: negative for weight changes and diabetic symptoms including polyuria, polydipsia and polyphagia  MUSCULOSKELETAL: refer to HPI  NEUROLOGICAL: negative for headaches, slurred speech, unilateral weakness  PSYCHIATRIC/BEHAVIORAL: negative for hallucinations, behavioral problems, confusion and agitation.      Physical Examination:  Vitals:   Patient Vitals for the past 24 hrs:   BP Temp Temp src Pulse Resp SpO2 Weight   10/01/20 0820 (!) 93/57 97.7 °F (36.5 °C) Oral 89 16 91 % --   10/01/20 0819 -- -- -- -- 18 91 % --   10/01/20 0338 (!) 107/59 98 °F (36.7 °C) Oral 102 16 91 % 156 lb 4.9 oz (70.9 kg)   09/30/20 2345 115/62 97.9 °F (36.6 °C) Oral 105 16 90 % --   09/30/20 2017 128/67 98.2 °F (36.8 °C) Oral 106 16 92 % --   09/30/20 2000 (!) 140/62 -- -- -- 21 95 % --   09/30/20 1955 -- -- -- 115 19 94 % --   09/30/20 1950 -- -- -- 111 22 98 % --   09/30/20 1948 -- 98 °F (36.7 °C) Temporal -- -- -- --   09/30/20 1945 127/72 -- -- 112 12 100 % --   09/30/20 1940 108/73 -- -- 118 15 100 % --   09/30/20 1935 (!) 112/58 -- -- 101 13 100 % --   09/30/20 1930 (!) 103/54 -- -- 103 13 100 % --   09/30/20 1925 (!) 85/54 -- -- 103 12 100 % --   09/30/20 1923 (!) 85/54 97.1 °F (36.2 °C) Temporal 104 13 100 % --   09/30/20 1543 (!) 145/69 97.2 °F (36.2 °C) Temporal 93 14 97 % --   09/30/20 1139 (!) 150/68 98.3 °F (36.8 °C) Oral 81 16 92 % --     Const: Alert. WDWN. No distress  Eyes: Conjunctiva noninjected, no icterus noted; pupils equal, round, and reactive to light. HENT: Atraumatic, normocephalic; Oral mucosa moist  Neck: Trachea midline, neck supple. No thyromegaly noted. CV: Regular rate and rhythm, no murmur rub or gallop noted  Resp: Lungs decreased at bases (R>L). No rales wheezes or rhonchi, no retractions. Respirations unlabored. GI: Soft, nontender, nondistended. Normal bowel sounds. No palpable masses. Skin: Normal temperature and turgor. No rashes or breakdown noted. Ext: No significant edema appreciated. No varicosities. MSK: Decreased left hip ROM. No joint tenderness, erythema, warmth noted. Neuro: Alert, oriented, appropriate. No cranial nerve deficits appreciated. Sensation intact to light touch. Motor examination reveals strength overall 5-/5 except 4/5 right hip flexion and 2-3/5 left hip flexion (limited by pain), did not fully test distal LLE due to WB restrictions. No abnormalities with finger/nose noted. Reflexes diminished, symmetric. Psych: Stable mood, normal judgement, normal affect     Lab Results   Component Value Date    WBC 10.6 10/01/2020    HGB 10.2 (L) 10/01/2020    HCT 30.0 (L) 10/01/2020    MCV 93.6 10/01/2020     10/01/2020     Lab Results   Component Value Date    INR 1.12 09/29/2020    PROTIME 13.0 09/29/2020     Lab Results   Component Value Date    CREATININE 1.3 10/01/2020    BUN 26 (H) 10/01/2020     10/01/2020    K 4.7 10/01/2020     10/01/2020    CO2 25 10/01/2020     Lab Results   Component Value Date    ALT 17 09/29/2020    AST 28 09/29/2020    ALKPHOS 116 09/29/2020    BILITOT 0.7 09/29/2020       Most recent echocardiogram revealed:   Left ventricle size is normal. Normal left ventricular wall thickness. Ejection fraction is visually estimated to be 55-60 %.  No regional wall   motion diffuse    parenchymal volume loss is noted.  Vascular calcification is seen. Giovanny Austin is    no evidence of hydrocephalus.         ORBITS: The visualized portion of the orbits demonstrate no acute abnormality.         SINUSES: The visualized paranasal sinuses and mastoid air cells demonstrate    no acute abnormality.         SOFT TISSUES/SKULL:  No acute abnormality of the visualized skull or soft    tissues. Xray left hip  Pelvis with left hip: There is a comminuted, intertrochanteric fracture of    the left hip with valgus angulation.  No hip dislocation is identified.         Left femur: No fracture of the distal femur is visualized        On my review, CXR displays RLL airspace disease and effusion. Assessment:  1. Left intertrochanteric femur fracture, femoral neck fracture -s/p ORIF with cephalomedullary nail (9/30 with Dr. Meryle Christen)   2. Hepatic lesions - concerning for metastatic disease, GI consulted for biopsy   3 . Acute blood loss anemia  4. HTN-->post op hypotension  5. Chronic right pleural effusion  6. COPD  7. HLD  8. Hypothyroidism    Impairments: 25% WB LLE, decreased balance, endurance, safety awareness    Recommendations:    Currently with fair tolerance to therapy in the immediate post-op period. May need to consider SNF for slower pace. However, he is very motivated to work with therapies and was active and independent at baseline. Will continue to follow progress to see if tolerance improves. Thank you for this consult. Please contact me with any questions or concerns. Michael Morales.  Soumya Mcdonald MD 10/1/2020, 11:36 AM

## 2020-10-01 NOTE — OP NOTE
[address] [place] [address]                                OPERATIVE REPORT    PATIENT NAME: Chino Newby                   :        1927  MED REC NO:   [id number]                          ROOM:       9274  ACCOUNT NO:   [de-identified]                           ADMIT DATE: 2020  PROVIDER:     Ezekiel Hernandez MD      DATE OF PROCEDURE:  2020    PREOPERATIVE DIAGNOSIS:  Left intertrochanteric/subtrochanteric hip  fracture. POSTOPERATIVE DIAGNOSES:  1. Left intertrochanteric hip fracture. 2.  left femoral neck fracture. OPERATION PERFORMED:  Open treatment of the left hip fractures with  cephalomedullary nail and accessory cannulated screw. SURGEON:  Ezekiel Hernandez MD    ASSISTANT:  Darrin Rios. ANESTHESIA:  General endotracheal.    BLOOD LOSS:  Approximately 100 mL. IMPLANTS:  1. Synthes left TFNA intermediate nail, 10 mm x 235 mm.  2.  751-NU length TFNA helical blade. 3.  5-mm locking screw. 4.  6.5 mm x 95 mm partially threaded cannulated screw with a washer. INDICATIONS:  The patient is a pleasant 42-year-old male who  unfortunately sustained a mechanical fall at home. He was found to have  a left comminuted hip fracture. I discussed with the patient and his  family details of his injury, my recommended treatment being open  fixation. Please refer to my preoperative consult note for full  details. Informed consent was obtained. OPERATIVE PROCEDURE:  The patient was brought back to the OR. General  anesthesia was administered. Traction boots were placed to both feet. He was transferred onto the [facility] table. All pressure points were well  padded. A well-padded perineal post was used. The legs were brought  into the scissored position.   The left operative extremity was placed  into slight adduction, longitudinal traction and rotation so that the  patella was facing straight up.  With this maneuver, the fracture did  reduce relatively nicely with ligamentotaxis. On the lateral view, a   separate femoral neck fracture at the subcapital/transcervical region  could be seen. The entire left lower extremity up to the flank was subsequently prepped  and draped in the usual sterile fashion. A full timeout was performed  with all parties in agreement. The patient received preoperative IV  Ancef. A small longitudinal incision was made just proximal to the greater  trochanter, and the incision was carried down past the skin and  subcutaneous fat. The deep fascia was then incised in line with the  skin incision and the starting point was obtained with the awl. The  starting point was confirmed on orthogonal imaging and then introduced  to the metaphyseal region followed by placement of the ball-tipped  guidewire. The opening reamer was then used followed by sequential  reaming. There was light chatter with a 10-mm reamer and I reamed up to  size 12. The 10-mm diameter intermediate nail was then opened and  assembled on the back table and then malleted down to the appropriate  position. The guidewire was removed and a small lateral thigh incision was  made past the IT band and the trocar for the helical blade was then  inserted down to the lateral cortex. A guidewire was then inserted  with the goal for tip-apex distance of less than 25 mm. Once the  trajectory of the guidewire was confirmed on orthogonal imaging, I  measured the guidewire and decided on a 295-OD length helical blade. A derotation wire was inserted posteriorly to prevent rotational  deformity. The cannulated drill was then used and the formal blade   was malleted down to the appropriate position. The proximal cap screw   was tightened down to the static position. The guidewires were removed. The distal locking screw was then inserted using the distal targeter. Next, the jigs were removed.   In order to

## 2020-10-01 NOTE — PROGRESS NOTES
Consulted per  for liver biopsy. This patient came in for fall and repair of femur fracture. The liver lesion was an incidental finding on a CT scan. He has a remote history of skin cancer - unsure of type. [facility] has not seen this patient and his  POD 1. I spoke with Nichole Dear from . We can reach the liver lesion via CT however, it is small in size. We would prefer to wait until this patient has been seen per [facility] for mulitple reasons:  1. Assure there would be treatment given his age and DNR-CCA status and post surgical repain  2. If we proceed with biopsy, make sure all the desired testing is ordered to avoid needing to repeat a liver biopsy, especially with the smaller size of the lesion  3. Wait until the patient has been moved to rehab since this was an incidental finding. Nichole Dear will speak with Dr. Rafal Baez about getting [facility] on board with this patient. This has been discussed with Dr. Noah Willis in IR.   Electronically signed by Nathalie Christina RN on 10/1/2020 at 1:02 PM  Clinical Coordinator Interventional Radiology         (office) [phone]

## 2020-10-01 NOTE — PROGRESS NOTES
Call placed out to Dr. Lesvia Bar per daughter in law request to see if he will be back to speak with her or call her about the CT results. Will continue to monitor and reassess.   Electronically signed by Sophia Braga RN on 10/1/2020 at 2:52 PM

## 2020-10-01 NOTE — PROGRESS NOTES
Pt back from CT at this time. Repositioned in bed. Lunch tray ordered at this time. Will continue to monitor and reassess.   Electronically signed by Renata Montero RN on 10/1/2020 at 11:39 AM

## 2020-10-01 NOTE — PROGRESS NOTES
move it. \"     Interval History/Subjective: daughter in law at bedside. Patient doing well. Lying flat in bed. Has 4L nc on. Sounds wheezy in TIERRA posteriorly. Will give nebulizer tx. Daughter says he has inhalers at home but he doesn't use them. POD1    Open treatment of the left hip fracture with  cephalomedullary nail with an accessory cannulated screw. Per GI: CT A&P : multiple hepatic metastasis. Blanche metastasis also seen right pericardial phrenic space. Incompletely healed compression fracture L4. Planned for liver biopsy tomorrow with GI for further evaluation and discuss treatment and desired level of care at that point. If negative then EGD/colon if patient wants full evaluation  Cortes Moreno MD    Hgb dropped to 10.2 from 12.9 from OR yesterday. Cont to monitor. Review of Systems:     The patient denied headaches, visual changes, LOC, SOB, CP, ABD pain, N/V/D, skin changes, new or worsening weakness or neuromuscular deficits. Comprehensive ROS negative except as mentioned above. Past Medical History:        Diagnosis Date    Arthritis     BPH with obstruction/lower urinary tract symptoms     Dr. Dago Wharton Diverticular disease     Diverticulosis of sigmoid colon with h/o bleeding    Ectropion of right eye     Emphysema of lung (HCC)     PLASCENCIA    Hypercholesteremia     Macular degeneration     Dr. Octavia Briseno Moderate mitral regurgitation 03/14/2017    with mild Aortic Regurge and diastolic dysfunction    PONV (postoperative nausea and vomiting)     Squamous cell skin cancer     Nose and face    Vitamin D deficiency     Wears glasses        Past Surgical History:        Procedure Laterality Date    CATARACT REMOVAL WITH IMPLANT Bilateral     COLONOSCOPY  06/20/2016    [facility]      Dr. Nancy Guy       Allergies:  Erythromycin base; Fentanyl; Penicillins; and Versed [midazolam]    Past medical and surgical history reviewed. Any changes have been noted. BILITOT 0.7 09/29/2020        No flowsheet data found. No results found for: LABA1C    Imaging:  XR HIP LEFT (2-3 VIEWS)   Final Result   Intraprocedural fluoroscopic spot images as above. See separate procedure   report for more information. FLUORO FOR SURGICAL PROCEDURES   Final Result      CT CHEST WO CONTRAST   Final Result   1. No evidence of pneumonia   2. Chronic right pleural effusion with pleural thickening and adjacent right   lower lobe atelectasis   3. Emphysema, chronic interstitial changes, stable left pulmonary nodules and   pleural plaques   4. Right juxta cardiac adenopathy concerning for metastatic disease   5. New hepatic metastatic disease   6. Hiatal hernia with gastroesophageal reflux         CT Cervical Spine WO Contrast   Final Result   No acute abnormality of the cervical spine. CT Head WO Contrast   Final Result   No acute intracranial abnormality. XR FEMUR LEFT (MIN 2 VIEWS)   Final Result   Acute fracture of the left hip. XR HIP 2-3 VW W PELVIS LEFT   Final Result   Acute fracture of the left hip. XR CHEST PORTABLE   Final Result   Airspace disease in the right lower lung, most compatible with pneumonia,   with a small parapneumonic effusion. There is sharp margination of the right heart border, which is new when   compared to the previous exam, raising the possibility of lobar collapse. Consider further evaluation with chest CT.          CT ABDOMEN PELVIS W IV CONTRAST Additional Contrast? None    (Results Pending)       Scheduled and prn Medications:    Scheduled Meds:   enoxaparin  40 mg Subcutaneous Daily    ceFAZolin  2 g Intravenous Q8H    budesonide-formoterol  2 puff Inhalation BID    tiotropium  2 puff Inhalation Daily    levothyroxine  50 mcg Oral Daily    pantoprazole  40 mg Oral QAM AC    tamsulosin  0.4 mg Oral Daily    sodium chloride flush  10 mL Intravenous 2 times per day     Continuous Infusions:   sodium chloride 75 mL/hr at 10/01/20 2433    lactated ringers Stopped (10/01/20 0656)     PRN Meds:.morphine, albuterol sulfate HFA, sodium chloride flush, promethazine **OR** ondansetron, potassium chloride, magnesium sulfate    Assessment & Plan:        Post-traumatic intertrochanteric fracture left hip  - consult Ortho Surg  - NPO  - covid pre-op screening neg  - EKG: Sinus tachycardia with 1st degree A-V block with Fusion complexes;' Nonspecific ST and T wave  - PT/OT post-op   - social work consult for dispo needs  - analgesics PRN  - IVF while NPO   - POD1      COPD  - cont home meds: trelegy  - O2 PRN; on RA  - CXR : airspace disease RLL; CT recommended by radiology  - WBC 14.8  - CT chest: no pneumonia; chronic right pleural effusion with pleural thickening and adjacent right lower lobe atelectasis, emphysema, chronic interstitial changes, stable left pulmonary nodules and pleural plaques     HTN  - cont home meds     HLD  - cont home meds     Thyroid dysfunction  - cont synthroid     New liver mets  No known primary or previous hx CA  - Consulted GI--Will plan CT A&P for further evaluation for primary. If negative then EGD/colon if patient wants full evaluation  Graciela Sharp MD    Continue current regimen/therapies. Monitor. Adjust medical regimen as appropriate. Body mass index is 21.2 kg/m². The patient and / or the family were informed of the results of any tests, a time was given to answer questions, a plan was proposed and they agreed with plan.       DVT ppx: lovenox  GI ppx: protonix    Diet: Dietary Nutrition Supplements: Standard High Calorie Oral Supplement  DIET GENERAL;    Consults:  IP CONSULT TO SOCIAL WORK  IP CONSULT TO GI  IP CONSULT TO PHYSICAL MEDICINE REHAB    DISPO/placement plan: pending    Code Status: DNR-CCA      RANDA Rice CNP  10/01/20

## 2020-10-02 ENCOUNTER — TELEPHONE (OUTPATIENT)
Dept: FAMILY MEDICINE CLINIC | Age: 85
End: 2020-10-02

## 2020-10-02 LAB
ANION GAP SERPL CALCULATED.3IONS-SCNC: 7 MMOL/L (ref 3–16)
BUN BLDV-MCNC: 28 MG/DL (ref 7–20)
CALCIUM SERPL-MCNC: 8.3 MG/DL (ref 8.3–10.6)
CHLORIDE BLD-SCNC: 105 MMOL/L (ref 99–110)
CO2: 26 MMOL/L (ref 21–32)
CREAT SERPL-MCNC: 1.4 MG/DL (ref 0.8–1.3)
GFR AFRICAN AMERICAN: 57
GFR NON-AFRICAN AMERICAN: 47
GLUCOSE BLD-MCNC: 116 MG/DL (ref 70–99)
HCT VFR BLD CALC: 27.4 % (ref 40.5–52.5)
HEMOGLOBIN: 9.3 G/DL (ref 13.5–17.5)
MCH RBC QN AUTO: 32 PG (ref 26–34)
MCHC RBC AUTO-ENTMCNC: 33.9 G/DL (ref 31–36)
MCV RBC AUTO: 94.5 FL (ref 80–100)
PDW BLD-RTO: 13.2 % (ref 12.4–15.4)
PLATELET # BLD: 170 K/UL (ref 135–450)
PMV BLD AUTO: 8.5 FL (ref 5–10.5)
POTASSIUM SERPL-SCNC: 4.4 MMOL/L (ref 3.5–5.1)
RBC # BLD: 2.9 M/UL (ref 4.2–5.9)
SODIUM BLD-SCNC: 138 MMOL/L (ref 136–145)
WBC # BLD: 8.4 K/UL (ref 4–11)

## 2020-10-02 PROCEDURE — 2580000003 HC RX 258: Performed by: INTERNAL MEDICINE

## 2020-10-02 PROCEDURE — 36415 COLL VENOUS BLD VENIPUNCTURE: CPT

## 2020-10-02 PROCEDURE — 80048 BASIC METABOLIC PNL TOTAL CA: CPT

## 2020-10-02 PROCEDURE — 94761 N-INVAS EAR/PLS OXIMETRY MLT: CPT

## 2020-10-02 PROCEDURE — 2700000000 HC OXYGEN THERAPY PER DAY

## 2020-10-02 PROCEDURE — 97530 THERAPEUTIC ACTIVITIES: CPT

## 2020-10-02 PROCEDURE — 97535 SELF CARE MNGMENT TRAINING: CPT

## 2020-10-02 PROCEDURE — 2580000003 HC RX 258: Performed by: NURSE PRACTITIONER

## 2020-10-02 PROCEDURE — 6370000000 HC RX 637 (ALT 250 FOR IP): Performed by: NURSE PRACTITIONER

## 2020-10-02 PROCEDURE — 85027 COMPLETE CBC AUTOMATED: CPT

## 2020-10-02 PROCEDURE — 1200000000 HC SEMI PRIVATE

## 2020-10-02 PROCEDURE — 99024 POSTOP FOLLOW-UP VISIT: CPT | Performed by: ORTHOPAEDIC SURGERY

## 2020-10-02 PROCEDURE — 6370000000 HC RX 637 (ALT 250 FOR IP): Performed by: INTERNAL MEDICINE

## 2020-10-02 PROCEDURE — 6360000002 HC RX W HCPCS: Performed by: ORTHOPAEDIC SURGERY

## 2020-10-02 RX ORDER — 0.9 % SODIUM CHLORIDE 0.9 %
500 INTRAVENOUS SOLUTION INTRAVENOUS ONCE
Status: COMPLETED | OUTPATIENT
Start: 2020-10-02 | End: 2020-10-02

## 2020-10-02 RX ADMIN — ENOXAPARIN SODIUM 40 MG: 40 INJECTION SUBCUTANEOUS at 09:19

## 2020-10-02 RX ADMIN — SODIUM CHLORIDE, PRESERVATIVE FREE 10 ML: 5 INJECTION INTRAVENOUS at 09:23

## 2020-10-02 RX ADMIN — HYDROCODONE BITARTRATE AND ACETAMINOPHEN 1 TABLET: 5; 325 TABLET ORAL at 03:16

## 2020-10-02 RX ADMIN — HYDROCODONE BITARTRATE AND ACETAMINOPHEN 1 TABLET: 5; 325 TABLET ORAL at 09:19

## 2020-10-02 RX ADMIN — TAMSULOSIN HYDROCHLORIDE 0.4 MG: 0.4 CAPSULE ORAL at 09:19

## 2020-10-02 RX ADMIN — SODIUM CHLORIDE 500 ML: 9 INJECTION, SOLUTION INTRAVENOUS at 10:52

## 2020-10-02 ASSESSMENT — PAIN SCALES - GENERAL
PAINLEVEL_OUTOF10: 1
PAINLEVEL_OUTOF10: 4
PAINLEVEL_OUTOF10: 0
PAINLEVEL_OUTOF10: 6

## 2020-10-02 ASSESSMENT — PAIN DESCRIPTION - PROGRESSION
CLINICAL_PROGRESSION: GRADUALLY IMPROVING
CLINICAL_PROGRESSION: GRADUALLY WORSENING
CLINICAL_PROGRESSION: GRADUALLY IMPROVING
CLINICAL_PROGRESSION: GRADUALLY WORSENING

## 2020-10-02 ASSESSMENT — PAIN - FUNCTIONAL ASSESSMENT
PAIN_FUNCTIONAL_ASSESSMENT: PREVENTS OR INTERFERES SOME ACTIVE ACTIVITIES AND ADLS

## 2020-10-02 ASSESSMENT — PAIN DESCRIPTION - ONSET
ONSET: ON-GOING

## 2020-10-02 ASSESSMENT — PAIN DESCRIPTION - FREQUENCY
FREQUENCY: INTERMITTENT

## 2020-10-02 ASSESSMENT — PAIN DESCRIPTION - LOCATION
LOCATION: HIP

## 2020-10-02 ASSESSMENT — PAIN DESCRIPTION - DESCRIPTORS
DESCRIPTORS: ACHING

## 2020-10-02 ASSESSMENT — PAIN DESCRIPTION - PAIN TYPE
TYPE: ACUTE PAIN
TYPE: SURGICAL PAIN
TYPE: ACUTE PAIN
TYPE: SURGICAL PAIN

## 2020-10-02 ASSESSMENT — PAIN DESCRIPTION - ORIENTATION
ORIENTATION: LEFT

## 2020-10-02 NOTE — PROGRESS NOTES
[facility] Orthopedic Surgery   Progress Note      S/P :  SUBJECTIVE  In chair. . Pain is   described in left hip and with the intensity of moderate. Pain is described as aching. Pain worse with activity and better at rest. He is alert and oriented. OBJECTIVE              Physical                      VITALS:  /73   Pulse 89   Temp 98 °F (36.7 °C) (Oral)   Resp 15   Ht 6' (1.829 m)   Wt 156 lb 15.5 oz (71.2 kg)   SpO2 92%   BMI 21.29 kg/m²                     MUSCULOSKELETAL:  left foot NVI. Wiggles toes to command. Pedal pulses are palpable. NEUROLOGIC:                                  Sensory:  Touch:  Left Lower Extremity:  normal                                                 Surgical wound appears clean and dry left hip.      Data       CBC:   Lab Results   Component Value Date    WBC 8.4 10/02/2020    RBC 2.90 10/02/2020    HGB 9.3 10/02/2020    HCT 27.4 10/02/2020    MCV 94.5 10/02/2020    MCH 32.0 10/02/2020    MCHC 33.9 10/02/2020    RDW 13.2 10/02/2020     10/02/2020    MPV 8.5 10/02/2020        WBC:    Lab Results   Component Value Date    WBC 8.4 10/02/2020        Hemoglobin/Hematocrit:    Lab Results   Component Value Date    HGB 9.3 10/02/2020    HCT 27.4 10/02/2020        PT/INR:    Lab Results   Component Value Date    PROTIME 13.0 09/29/2020    INR 1.12 09/29/2020              Current Inpatient Medications             Current Facility-Administered Medications: 0.9 % sodium chloride bolus, 500 mL, Intravenous, Once  enoxaparin (LOVENOX) injection 40 mg, 40 mg, Subcutaneous, Daily  morphine (PF) injection 2 mg, 2 mg, Intravenous, Q4H PRN  HYDROcodone-acetaminophen (NORCO) 5-325 MG per tablet 1 tablet, 1 tablet, Oral, Q6H PRN  ipratropium-albuterol (DUONEB) nebulizer solution 1 ampule, 1 ampule, Inhalation, Q4H PRN  budesonide-formoterol (SYMBICORT) 80-4.5 MCG/ACT inhaler 2 puff, 2 puff, Inhalation, BID  tiotropium (SPIRIVA RESPIMAT) 2.5 MCG/ACT inhaler 2 puff, 2 puff, Inhalation, Daily  0.9 % sodium chloride infusion, , Intravenous, Continuous  albuterol sulfate  (90 Base) MCG/ACT inhaler 2 puff, 2 puff, Inhalation, Q6H PRN  levothyroxine (SYNTHROID) tablet 50 mcg, 50 mcg, Oral, Daily  pantoprazole (PROTONIX) tablet 40 mg, 40 mg, Oral, QAM AC  tamsulosin (FLOMAX) capsule 0.4 mg, 0.4 mg, Oral, Daily  sodium chloride flush 0.9 % injection 10 mL, 10 mL, Intravenous, 2 times per day  sodium chloride flush 0.9 % injection 10 mL, 10 mL, Intravenous, PRN  promethazine (PHENERGAN) tablet 12.5 mg, 12.5 mg, Oral, Q6H PRN **OR** ondansetron (ZOFRAN) injection 4 mg, 4 mg, Intravenous, Q6H PRN  potassium chloride 10 mEq/100 mL IVPB (Peripheral Line), 10 mEq, Intravenous, PRN  magnesium sulfate 2 g in 50 mL IVPB premix, 2 g, Intravenous, PRN  lactated ringers infusion, , Intravenous, Continuous    ASSESSMENT AND PLAN    Fall  Left hip fx, IT and femoral neck  Left hip CMN nail and accessory cannulated screw per DR Litzy Conner, stable exam  Hepatic lesions , GI following, bx today  PT OT eval and treat 25% WB right leg  SS for DC planning  Lovenox as inpt after liver bx then ASA 81mg twice at day for 30 days for DVT prophylaxis      Masoud Do  10/2/2020  10:18 AM       Okay for discharge from ortho standpoint once arranged, approved by insurance, and all medical teams agree as well  Glen Dennison

## 2020-10-02 NOTE — PLAN OF CARE
Problem: Pain:  Goal: Pain level will decrease  Description: Pain level will decrease  10/2/2020 1037 by Cam Kitchen RN  Outcome: Ongoing  Note: Pt c/o L hip pain rated 6/10. PRN Norco administered. Will monitor for effectiveness. 10/2/2020 0123 by Margarito Serrano RN  Outcome: Ongoing  Note: Pt assessed for pain. Pt denies any pain at this time. Will continue to monitor pt and assess for pain throughout rest of shift. Goal: Control of acute pain  Description: Control of acute pain  10/2/2020 1037 by Cam Kitchen RN  Outcome: Ongoing  Note: Pt c/o L hip pain rated 6/10. PRN Norco administered. Will monitor for effectiveness. 10/2/2020 0123 by Margarito Serrano RN  Outcome: Ongoing  Note: Pt assessed for pain. Pt denies any pain at this time. Will continue to monitor pt and assess for pain throughout rest of shift. Goal: Control of chronic pain  Description: Control of chronic pain  10/2/2020 1037 by Cam Kitchen RN  Outcome: Ongoing  Note: Pt has had no c/o chronic pain issues this shift. 10/2/2020 0123 by Margarito Serrano RN  Outcome: Ongoing  Note: Pt assessed for pain. Pt denies any pain at this time. Will continue to monitor pt and assess for pain throughout rest of shift. Problem: Falls - Risk of:  Goal: Will remain free from falls  Description: Will remain free from falls  10/2/2020 1037 by Cam Kitchen RN  Outcome: Ongoing  Note: Pt free from injury or falls at this time, fall precautions in place, bed in low position, side rail up x2, Vanessa Fall Risk: High (45 and higher), bed alarm on, reoriented to room and call light, reminded not to get up without assistance, call light in reach, will continue to monitor. Pt verbalizes understanding of fall risk procedures. 10/2/2020 0123 by Margarito Serrano RN  Outcome: Ongoing  Note: Fall risk assessment completed. Fall precautions in place. Call light within reach. Pt educated on calling for assistance before getting up.  Walkway free of clutter. Will continue to monitor. Goal: Absence of physical injury  Description: Absence of physical injury  10/2/2020 1037 by Jaron Beaver RN  Outcome: Ongoing  Note: Pt has not incurred any type of physical injury this shift. 10/2/2020 0123 by Benji Gomez RN  Outcome: Ongoing  Note: Pt is free of injury. No injury noted. Fall precautions in place. Call light within reach. Will monitor. Problem: Skin Integrity:  Goal: Will show no infection signs and symptoms  Description: Will show no infection signs and symptoms  10/2/2020 1037 by Jaron Beaver RN  Outcome: Ongoing  Note: Pt has been afebrile with no s/s of infection noted. Will monitor for changes. 10/2/2020 0123 by Benji Gomez RN  Outcome: Ongoing  Note: Pt is free of signs and symptoms of infection. Incision and dressing are clean, dry and intact. Vital signs stable. Will monitor. Goal: Absence of new skin breakdown  Description: Absence of new skin breakdown  10/2/2020 1037 by Jaron Beaver RN  Outcome: Ongoing  Note: No new areas of skin breakdown noted. Will monitor for changes.   10/2/2020 0123 by Benji Gomez RN  Outcome: Ongoing

## 2020-10-02 NOTE — PROGRESS NOTES
Instructed pt on how to use IS, attempting at least 10 breaths per hour while awake. Pt able to reach 750. Decreased pt's O2 to 3L.  Will recheck O2 sat with next VS.

## 2020-10-02 NOTE — CARE COORDINATION
Patient and family agreeable to hospice follow up during hospitalization and initiate home hospice services on hospital discharge date. Follow up daily during hospital admission and on request.      Jessica Montes RN, [address], [address] [place] [address]   O: [phone]  C: [phone]  F: [phone]   Main & Referrals: [phone]   [facility]

## 2020-10-02 NOTE — TELEPHONE ENCOUNTER
----- Message from Kerwinrsavan Garfield sent at 10/2/2020 11:28 AM EDT -----  Subject: Message to Provider    QUESTIONS  Information for Provider? alyx ortiz called patients daughter in law   patient broke his leg had surgery and they want some quidance from [facility]   as far as him going to rehab   please call her back  ---------------------------------------------------------------------------  --------------  [address]  What is the best way for the office to contact you? OK to leave message on   voicemail  Preferred Call Back Phone Number? [phone]  ---------------------------------------------------------------------------  --------------  SCRIPT ANSWERS  Relationship to Patient? Other  Representative Name? Mackenzie Mitchell  Is the Representative on the appropriate HIPAA document in Epic?  Yes

## 2020-10-02 NOTE — CARE COORDINATION
Flores spoke with patient and daughter-in-law, Vipul Acuna ([phone]), at length regarding dc plan. They reported preferring acute rehab at [facility]. Patient is motivated to return home sooner than later. Patient's spouse has dementia and is currently at home being cared for by family. Flores explained acute rehab verses snf options. Flores explained the goal of acute rehab to dc patient's to home not snf for example. Patient reported he feels like he could do 3 hours of therapy and has the goal to dc home. Veronica asked about home care services. Flores explained home care, [facility], private duty and [place] aide and attendance. Vipul Acuna reported that they have already contacted [facility]. Patient is a [employer] vet, and Vipul Acuna will check into [employer]. Flores provided [facility]. Presented patient and Vipul Acuna with IMM letter. Flores spoke with Esperanza on acute rehab regarding patient/family request to be re-evaluated for acute rehab here. The Plan for Transition of Care is related to the following treatment goals: acute rehab vs snf--gave lists for both    The Patient and/or patient representative VERONICA was provided with a choice of provider and agrees   with the discharge plan. [x] Yes [] No    Freedom of choice list was provided with basic dialogue that supports the patient's individualized plan of care/goals, treatment preferences and shares the quality data associated with the providers.  [x] Yes [] No    Electronically signed by Rina Israel on 10/2/2020 at 12:43 PM

## 2020-10-02 NOTE — PROGRESS NOTES
Physical Therapy    Facility/Department: [address] ORTHOPEDICS  Co Treat with OT  Daily Treatment Note    This note serves as patient discharge summary if pt discharges prior to next PT visit        NAME: Bahman Vazquez  : 1927  MRN: [id number]    Date of Service: 10/2/2020    Discharge Recommendations:  Continue to assess pending progress   Bahman Vazquez scored a 10/24 on the AM-PAC short mobility form. PT Equipment Recommendations  Other: cont to assess. Functioning from [facility] level will likely make patient the most independent in functional mobility until PWBng restriction is lifted. Assessment   Body structures, Functions, Activity limitations: Decreased functional mobility ; Decreased ADL status; Decreased balance  Assessment: Pt is 80 y.o. M who presents s/p mechanical fall in kitchen resulting in closed pertrochanteric fracture of L femur. Pt is s/p L hip nailing and is 25% WBing. PTA pt lives with wife in one story home with 2 FINN. Pt reports he is wife's caregiver (does not have to physically assist but has to contreras her around and find her cane for her). Pt reports independence in self-care tasks, light homemaking responsibilities, and functional mobility with no device. Status 10-2-2020: Bed mobility Mod A 2, Transfers sit<>stand and stand pivot bed>chair with RW Min-Mod A of 2, including cues-assist for 25% L LE WBng. Patient is motivated to maximize recovery. He requires ongoing skilled therapy to maximize safety, independence, and tolerance of functional mobility. Limited L LE WBng will impact patient ability to return to independence as prior. Family appears to be very supportive.   Treatment Diagnosis: Impaired functional mobility  Prognosis: Good  History: as noted  PT Education: Goals;PT Role;Plan of Care;Transfer Training;Weight-bearing Education  REQUIRES PT FOLLOW UP: Yes  Activity Tolerance  Activity Tolerance: managed well overall but with episode of lightheadedness with the today which also confirmed a new enlarged right pericardiophrenic lymph node and an incompletely healed compression fracture at L4.\"  Patient is opting not to pursue investigation/treatment of this issue. Response To Previous Treatment: Patient with no complaints from previous session. Family / Caregiver Present: No  Subjective  Subjective: Patient in bed, awake. Agreeable to therapy. Rates L anterior thigh pain at 3/10. Understands need for decreased WBng L LE. Orientation  Orientation  Overall Orientation Status: Within Normal Limits     Social/Functional History  Social/Functional History  Lives With: Spouse  Type of Home: House  Home Layout: One level  Home Access: Stairs to enter with rails  Entrance Stairs - Number of Steps: 2 FINN  Entrance Stairs - Rails: Left  Bathroom Shower/Tub: Walk-in shower, Shower chair with back  Bathroom Toilet: Standard  Bathroom Equipment: Grab bars in shower, Grab bars around toilet  Bathroom Accessibility: Walker accessible  Home Equipment: [employer], Standard walker, Cane, Alert [employer] Help From: (8 Children -  6 are in town)  ADL Assistance: Independent  Homemaking Assistance: (Light meal prep, pt does laundry)  Ambulation Assistance: Independent  Transfer Assistance: Independent  Active : Yes(no night driving)  Occupation: Retired  Type of occupation: Rewarder  Additional Comments: Denies other recent falls     Cognition   Cognition  Overall Cognitive Status: WFL    Objective  Bed mobility  Supine to Sit: Moderate assistance;2 Person assistance(HOB elevated, use of rail - cues. Guarded.)  Sit to Supine: Unable to assess(Up in chair at end of session)  Transfers  Sit to Stand: Minimal Assistance; Moderate Assistance;2 Person Assistance(second assist to assure PWB maintained)  Stand to sit: Minimal Assistance; Moderate Assistance;2 Person Assistance(assist to position L LE to maintain 25% WBng)  Stand Pivot Transfers: (bed to [facility] chair, to the R, with cues/assist for maintenance of 25% WBng L LE)  Comment: Patient on 4 L O2 throughout. Sats drop to 81% . Within 3 minutes, recovers to 91% . RN informed. Balance  Posture: Good  Sitting - Static: Good  Sitting - Dynamic: Good  Standing - Static: Good;-(at RW)  Standing - Dynamic: Good;-(@ RW)        Plan   Plan  Times per week: 3-5  Safety Devices  Type of devices:  All fall risk precautions in place, Call light within reach, Chair alarm in place, Left in chair, Nurse notified(AURORA Morton informed)    AM-PAC Score  AM-PAC Inpatient Mobility Raw Score : 10 (10/02/20 1014)  AM-PAC Inpatient T-Scale Score : 32.29 (10/02/20 1014)  Mobility Inpatient CMS 0-100% Score: 76.75 (10/02/20 1014)  Mobility Inpatient CMS G-Code Modifier : CL (10/02/20 1014)    Goals  Short term goals  Time Frame for Short term goals: 1-2 days  Short term goal 1: bed mobility at mod/max assist. 10-2-2020 goal met  Short term goal 2: transfers at min/mod assist  Short term goal 3: ambulation at min assist of 2---  PWB 25% on left rolling walker for 5-6 feet  Patient Goals   Patient goals : wants to get home ASAP       Therapy Time   Individual Concurrent Group Co-treatment   Time In [place] [id number]         Time Out 1022         Minutes Kaden Ac PT  Electronically signed by Anna Steen, [address] [place] [address] Drive [phone])  on 10/2/2020 at 10:33 AM

## 2020-10-02 NOTE — CONSULTS
and outline a plan with him. The patient is DNR. The patient underwent an open reduction internal fixation with a  cephalomedullary nail on 2020. He is still trying to bear weight. Laboratory studies show a new anemia. At presentation, the hemoglobin  was 12.9, hematocrit 38.1. Postoperatively, his most recent study  completed today showed his hemoglobin and hematocrit down to 10.2 and  30.0. PAST MEDICAL HISTORY:  1. Hip fracture. 2.  Arthritis. 3.  BPH, followed by Dr. Odell Hassan. 4.  History of diverticular disease. 5.  History of macular degeneration. 6.  Moderate mitral valve regurgitation. 7.  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 8.  History of vitamin D deficiency. PAST SURGICAL HISTORY:  1. The patient is status post cataract removal with implant. 2.  Status post colonoscopy. 3.  Status post Mohs surgery. FAMILY HISTORY:  The patient's mother  at the age of 80. His  father  of old age. He lost a brother and also a sister to cancer. His daughter has multiple sclerosis. SOCIAL HISTORY:  The patient currently lives with his wife, age 80. He  does not drink or smoke. PHYSICAL EXAMINATION:  GENERAL:  Finds the patient to be elderly appearing. He is alert and  oriented. His speech is cogent and thought process is clear. VITAL SIGNS:  Stable. NECK:  Supple. There is no adenopathy. RESPIRATORY:  Lung sounds are diminished at the bases bilaterally. CARDIAC EXAM:  Shows a grade 2/6 systolic ejection murmur. ABDOMEN:  Soft and nontender. EXTREMITIES:  Show a wound on the left hip. He appears to be  neurovascularly intact. NEUROLOGIC EXAM:  Shows no gross focal findings. IMPRESSION:  The patient presents with radiographic findings highly  suggestive of metastatic disease. There is no obvious primary cancer. Today, I met at length with the patient to discuss the radiographic  findings.   The patient was aware of the fact that a biopsy had been  ordered but when I asked him what he would do with the results, he  stated that under no uncertain terms would he allow any chemotherapy,  immunotherapy, radiation or other intervention. He cites that he has  lived a good life and is at the age of 80 and does not have to put  himself through such treatments. This led to a discussion then about  the pros and cons of even proceeding with a biopsy. There is a small  but measurable chance that he could have an adverse event associated  with the biopsy procedure itself. Since he cites that he will not act  upon the results, one could make a strong argument not to pursue the  biopsy and instead to engage palliative services and even hospice care. He states that his primary goal is to get back to home and he knows that  he will likely need more help even though he does not like the idea of  anyone coming into his home. I put a consult into palliative care. The patient asked that we update  and notify, Yanira Hobson, his daughter-in-law, about plans going  forward. He could not recall her telephone number and I could not find  it in the chart. At this point, there is no plan for a biopsy and that hematologic and  oncologic care will be primarily supportive.         Vitaliy Ferreira MD, MPH    D: 10/01/2020 21:06:29       T: 10/01/2020 23:18:31     [email or web address]  Job#: [id number]     Doc#: [id number]    CC:

## 2020-10-02 NOTE — CARE COORDINATION
Flores spoke with Esperanza on acute rehab. She reported Dr. Priscilla Leung would review patient for admission. Flores informed Esperanza that patient is not pursuing hospice at this time and prefers acute rehab.      Electronically signed by Guillermo Elizabeth on 10/2/2020 at 4:11 PM

## 2020-10-02 NOTE — PROGRESS NOTES
Occupational Therapy  Facility/Department: [address] ORTHOPEDICS  Daily Treatment Note  NAME: Kary Travis  : 1927  MRN: [id number]    Date of Service: 10/2/2020    Assessment: Pt tolerated session well this date - minimal pain rating 3/10 and some dizziness after mobility with O2 sats dropping to 80% on 4L of O2. Pt completed bed mobility with mod Ax2 and sit <> stand transfers with min/mod Ax2 and monitoring for WBing. Pt completed stand pivot with RW with min Ax2 and cues. Pt completed grooming tasks with assist for denture care. Anticipate pt to require mod/max A for ADL needs at this time. Pt remains unsafe to d/c home at this time d/t level of assistance required. Pt and family are hopeful for IP rehab to [facility] quickly to get home to care ofr his wife. Anticipate pt and wife will require assistance from family long term. Pt continues to have limited activity tolerance - tolerating short transfer and requiring rest break. Pt is hard worker and motivated to get better. Continue to assess for appropriate discharge disposition/frequency of therapy. Discharge Recommendations:  Continue to assess pending progress, Patient would benefit from continued therapy after discharge       Assessment   Performance deficits / Impairments: Decreased endurance;Decreased strength;Decreased functional mobility ; Decreased ADL status; Decreased balance  Assessment: Pt tolerated session well this date - minimal pain rating 3/10 and some dizziness after mobility with O2 sats dropping to 80% on 4L of O2. Pt completed bed mobility with mod Ax2 and sit <> stand transfers with min/mod Ax2 and monitoring for WBing. Pt completed stand pivot with RW with min Ax2 and cues. Pt completed grooming tasks with assist for denture care. Anticipate pt to require mod/max A for ADL needs at this time. Pt remains unsafe to d/c home at this time d/t level of assistance required.  Pt and family are hopeful for IP rehab to [facility] quickly to get home to care ofr his wife. Anticipate pt and wife will require assistance from family long term. Pt continues to have limited activity tolerance - tolerating short transfer and requiring rest break. Pt is hard worker and motivated to get better. Continue to assess for appropriate discharge disposition/frequency of therapy. OT Education: OT Role;Transfer Training;Plan of Care;Precautions; ADL Adaptive Strategies  REQUIRES OT FOLLOW UP: Yes  Activity Tolerance  Activity Tolerance: Patient Tolerated treatment well  Activity Tolerance: Pt on 4L of O2 at start of session, desat to 81%  pt recovered in ~3 minutes to 91-92%. Somewhat dizzy but much less symptomatic. Safety Devices  Safety Devices in place: Yes(RN Anthony Platt) notified)  Type of devices: Nurse notified;Gait belt;Call light within reach; Chair alarm in place; Left in chair         Patient Diagnosis(es): The primary encounter diagnosis was Closed nondisplaced intertrochanteric fracture of left femur, initial encounter ([address] Diagnoses of Hepatic metastases ([address] Mediastinal adenopathy, and Pulmonary emphysema, unspecified emphysema type ([address] were also pertinent to this visit. has a past medical history of Arthritis, BPH with obstruction/lower urinary tract symptoms, Diverticular disease, Ectropion of right eye, Emphysema of lung ([address] Hypercholesteremia, Macular degeneration, Moderate mitral regurgitation, PONV (postoperative nausea and vomiting), Squamous cell skin cancer, Vitamin D deficiency, and Wears glasses. has a past surgical history that includes Mohs surgery; Cataract removal with implant (Bilateral); and Colonoscopy (06/20/2016).     Restrictions  Restrictions/Precautions  Restrictions/Precautions: Fall Risk, Weight Bearing  Lower Extremity Weight Bearing Restrictions  Left Lower Extremity Weight Bearing: Partial Weight Bearing  Partial Weight Bearing Percentage Or Pounds: 25% WBing  Position Activity Restriction  Other position/activity restrictions: 25% PWB left LE     Subjective   General  Chart Reviewed: Yes  Patient assessed for rehabilitation services?: Yes  Additional Pertinent Hx: Pt is 80 y.o. M who presents s/p mechanical fall in kitchen resulting in closed pertrochanteric fracture of L femur. Pt is s/p L hip nailing and is 25% WBing. PMH: Emphysema of lung, skin Ca  Family / Caregiver Present: No  Referring Practitioner: Elsa Henderson MD  Diagnosis: Closed pertrochanteric fracture of L femur  Subjective  Subjective: Pt met bedside, agreeable for therapy treatment session. Pt pleasant and reporting 3/10 pain. Objective    ADL  Feeding: Setup  Grooming: (Assist to manage denture care, pt completed face washing with setup)  Additional Comments: Pt declined further ADL needs at this time d/t arrival of breakfast tray. Balance  Sitting Balance: Contact guard assistance  Standing Balance: (min/mod A at RW)  Standing Balance  Time: ~2-3 minutes  Activity: Func mob, transfers    Functional Mobility  Functional - Mobility Device: Rolling Walker  Activity: Other  Assist Level: (Min Ax2)  Functional Mobility Comments: Pt completed functional mobility with RW with min Ax2, assist to maintain 25%WBing & assist to manage trunk. Bed mobility  Supine to Sit: Moderate assistance;2 Person assistance(HOB elevated, use of rail - cues. Guarded.)  Sit to Supine: Unable to assess(Up in chair at end of session)     Transfers  Sit to stand: Minimal assistance;2 Person assistance; Moderate assistance  Stand to sit: Minimal assistance;2 Person assistance; Moderate assistance  Transfer Comments: Min/mod Ax2 - min/mod Ax1 at trunk and to maintain WBing precautions, min Ax1 at trunk for transfer; stood from EOB to RW and sat to recliner chair        Cognition  Overall Cognitive Status: [address]  Times per week: 3-5  Current Treatment Recommendations: Endurance Training, Strengthening, Balance Training, Functional Mobility Training, Safety Education & Training, Equipment Evaluation, Education, & procurement, Patient/Caregiver Education & Training, Self-Care / ADL    AM-PAC Score        AM-PAC Inpatient Daily Activity Raw Score: 15 (10/02/20 1029)  AM-PAC Inpatient ADL T-Scale Score : 34.69 (10/02/20 1029)  ADL Inpatient CMS 0-100% Score: 56.46 (10/02/20 1029)  ADL Inpatient CMS G-Code Modifier : CK (10/02/20 1029)    Goals  Short term goals  Time Frame for Short term goals: prior to d/c: status of goals ongoing as of 10/2/20  Short term goal 1: Pt will complete sit <> stand transfers with min A  Short term goal 2: Pt will complete short functional mobility with min A  Short term goal 3: Pt will maintain 25% WBing with min cues  Short term goal 4: Pt will tolerate 2+ minutes of standing activity to increase strength and activity tolerance for self-care and transfers  Patient Goals   Patient goals : \"to get better and get home\"       Therapy Time   Individual Concurrent Group Co-treatment   Time In       0942   Time Out       1022   Minutes       40   Timed Code Treatment Minutes: 40 Minutes     If pt is discharged prior to next OT session, this note will serve as the discharge summary.     RAMONA Dillon/DESTINI#[id number]

## 2020-10-02 NOTE — PROGRESS NOTES
Gastroenterology Progress Note    Pietro Perera is a 80 y.o. male patient. Principal Problem:    Closed pertrochanteric fracture of femur, left, initial encounter [facility])  Active Problems: Moderate COPD (chronic obstructive pulmonary disease) (HCC)    Age-related osteoporosis with current pathological fracture    Fracture of femoral neck, left, closed (HCC)    Closed fracture of femur, intertrochanteric, left, initial encounter ([facility] [address]  Resolved Problems:    * No resolved hospital problems. *      SUBJECTIVE:  Has some leg pain. No nausea, vomiting. Tolerating the diet. No fevers.     Current Facility-Administered Medications: enoxaparin (LOVENOX) injection 40 mg, 40 mg, Subcutaneous, Daily  morphine (PF) injection 2 mg, 2 mg, Intravenous, Q4H PRN  HYDROcodone-acetaminophen (NORCO) 5-325 MG per tablet 1 tablet, 1 tablet, Oral, Q6H PRN  ipratropium-albuterol (DUONEB) nebulizer solution 1 ampule, 1 ampule, Inhalation, Q4H PRN  budesonide-formoterol (SYMBICORT) 80-4.5 MCG/ACT inhaler 2 puff, 2 puff, Inhalation, BID  tiotropium (SPIRIVA RESPIMAT) 2.5 MCG/ACT inhaler 2 puff, 2 puff, Inhalation, Daily  0.9 % sodium chloride infusion, , Intravenous, Continuous  albuterol sulfate  (90 Base) MCG/ACT inhaler 2 puff, 2 puff, Inhalation, Q6H PRN  levothyroxine (SYNTHROID) tablet 50 mcg, 50 mcg, Oral, Daily  pantoprazole (PROTONIX) tablet 40 mg, 40 mg, Oral, QAM AC  tamsulosin (FLOMAX) capsule 0.4 mg, 0.4 mg, Oral, Daily  sodium chloride flush 0.9 % injection 10 mL, 10 mL, Intravenous, 2 times per day  sodium chloride flush 0.9 % injection 10 mL, 10 mL, Intravenous, PRN  promethazine (PHENERGAN) tablet 12.5 mg, 12.5 mg, Oral, Q6H PRN **OR** ondansetron (ZOFRAN) injection 4 mg, 4 mg, Intravenous, Q6H PRN  potassium chloride 10 mEq/100 mL IVPB (Peripheral Line), 10 mEq, Intravenous, PRN  magnesium sulfate 2 g in 50 mL IVPB premix, 2 g, Intravenous, PRN  lactated ringers infusion, , Intravenous, plans for therapy so no further diagnostic evaluation needed. Will sign off. Please call with questions. Thank you for allowing me to participate in the care of your patient. Please feel free to contact me with any concerns.   [address], [place]

## 2020-10-02 NOTE — PROGRESS NOTES
Department of Physical Medicine & Rehabilitation  Progress Note    Patient Identification:  Nallely Boo  [id number]  : 1927  Admit date: 2020    Chief Complaint: Closed pertrochanteric fracture of femur, left, initial encounter ([address]    Subjective:   No acute events overnight. Patient seen this afternoon sitting up in room. Did well with pivot transfer to chair today. Denies any pain currently. Remains very motivated to come to ARU.     ROS: No f/c, n/v, cp     Objective:  Patient Vitals for the past 24 hrs:   BP Temp Temp src Pulse Resp SpO2 Weight   10/02/20 0922 -- -- -- -- -- 92 % --   10/02/20 0820 127/73 98 °F (36.7 °C) Oral 89 15 92 % --   10/02/20 0504 -- -- -- -- -- -- 156 lb 15.5 oz (71.2 kg)   10/01/20 2046 112/65 98.4 °F (36.9 °C) Oral 106 18 92 % --   10/01/20 2013 -- -- -- -- 16 95 % --     Const: Alert. No distress, pleasant. HEENT: Normocephalic, atraumatic. Normal sclera/conjunctiva. MMM. CV: Regular rate and rhythm. Resp: No respiratory distress. Lungs decreased at bases R>L. Abd: Soft, nontender, nondistended, NABS+   Ext: No edema. MSK: decreased left hip ROM  Neuro: Alert, oriented, appropriately interactive. Psych: Cooperative, appropriate mood and affect    Laboratory data: Available via EMR.    Last 24 hour lab  Recent Results (from the past 24 hour(s))   Basic Metabolic Panel    Collection Time: 10/02/20  5:42 AM   Result Value Ref Range    Sodium 138 136 - 145 mmol/L    Potassium 4.4 3.5 - 5.1 mmol/L    Chloride 105 99 - 110 mmol/L    CO2 26 21 - 32 mmol/L    Anion Gap 7 3 - 16    Glucose 116 (H) 70 - 99 mg/dL    BUN 28 (H) 7 - 20 mg/dL    CREATININE 1.4 (H) 0.8 - 1.3 mg/dL    GFR Non- 47 (A) >60    GFR  57 (A) >60    Calcium 8.3 8.3 - 10.6 mg/dL   CBC    Collection Time: 10/02/20  5:42 AM   Result Value Ref Range    WBC 8.4 4.0 - 11.0 K/uL    RBC 2.90 (L) 4.20 - 5.90 M/uL    Hemoglobin 9.3 (L) 13.5 - 17.5 g/dL    Hematocrit 27.4 (L) 40.5 - 52.5 %    MCV 94.5 80.0 - 100.0 fL    MCH 32.0 26.0 - 34.0 pg    MCHC 33.9 31.0 - 36.0 g/dL    RDW 13.2 12.4 - 15.4 %    Platelets 503 621 - 529 K/uL    MPV 8.5 5.0 - 10.5 fL       Therapy progress:  PT  Position Activity Restriction  Other position/activity restrictions: 25% PWB left LE  Objective     Sit to Stand: Minimal Assistance, Moderate Assistance, 2 Person Assistance(second assist to assure PWB maintained)  Stand to sit: Minimal Assistance, Moderate Assistance, 2 Person Assistance(assist to position L LE to maintain 25% WBng)  Device: Rolling Walker  Assistance: Minimal assistance, 2 Person assistance  Distance: bed to recliner ~4 feet  OT  PT Equipment Recommendations  Other: cont to assess. Functioning from [facility] level will likely make patient the most independent in functional mobility until PWBng restriction is lifted. Assessment        SLP          Body mass index is 21.29 kg/m². Assessment:  1. Left intertrochanteric femur fracture, femoral neck fracture -s/p ORIF with cephalomedullary nail (9/30 with Dr. Anita Hung)   2. Hepatic lesions - concerning for metastatic disease, no plans for further work-up or treatment  3. Acute blood loss anemia  4. HTN-->post op hypotension  5. Acute hypoxic respiratory failure -- O2 requirement up to 4L  6. Chronic right pleural effusion  7. COPD  8. HLD  9. Hypothyroidism  10. SHREE -- on IVF     Impairments: 25% WB LLE, decreased balance, endurance, safety awareness     Recommendations:     Patient is a candidate for ARU when medically appropriate. Note increased O2 requirement and Cr bump today. I did order incentive spirometer. Had long discussion with patient and daughter-in-law about functional prognosis and likely need for assistance when discharged from rehab. Also discussed need for more assistance long-term given suspected metastatic cancer. They verbalized their understanding.  They plan to hire services and have family provide assist. Patient

## 2020-10-02 NOTE — PROGRESS NOTES
Pt resting in bed. A&Ox4. Pt c/o left leg pain once overnight, norco given per orders. No other complaints at this time. Fall precautions in place, call light and bedside table within reach.

## 2020-10-02 NOTE — PROGRESS NOTES
Pt sitting up in chair majority of shift. He has had no further c/o L hip pain and is tolerating being in chair well. He reports being very motivated to go to ARU so he can get home faster. Will continue to monitor.

## 2020-10-02 NOTE — PROGRESS NOTES
Hospital Medicine Progress Note      Admit Date: 9/29/2020       CC: F/U for fall    HPI: A 80nyearnold  male who presents  to the hospital, status post mechanical fall when he was turning around,  trying to grab on to something and lost his balance followed by  excruciating pain in the left hip and inability to get up from the  ground without any nausea, vomiting, fevers, or chills.  No other  constitutional symptoms.  No shortness or breath, chest pain, or chest  tightness.  No nausea or vomiting.     CT chest:  chronic right pleural effusion with pleural thickening and adjacent right lower lobe atelectasis, emphysema, chronic interstitial changes, stable left pulm nodules and pleural plaques. New hepatic metastatic disease.  Right juxta cardiac adenopathy concerning for metastatic disease. Hiatal hernia with GERD     Has seen Dr. Michelle Jaimes in the past for Pulmonology for emphysema and right pleural effusion.      Patient has hx fecal incontinence and has been seen by colorectal surgery, Dr. Belle Sharma in the past.  No mass or polyps, moderately inflamed inflamed internal hemorrhoids      9/30: going to OR for left hip repair at 4pm today.      CXR with airspace disease RLL; recommend CT. CT chest neg for PNA; chronic right pleural effusion with pleural thickening and adjacent right lower lobe atelectasis, emphysema, chronic interstitial changes, stable left pulm nodules and pleural plaques. New hepatic metastatic disease.  Right juxta cardiac adenopathy concerning for metastatic disease. Hiatal hernia with GERD.     Daughter in law at bedside explained he had seen GI MD previously for colonoscopy for hematochezia and they had seen a \"shadow\" of something somewhere and at one point discussed further testing, but MD thought it was \"too risky\".       Consult placed to GI for new hepatic metastatic disease.      Patient said he \"just lost my balance\" causing him to fall.  Has left hip pain \"only when I move it.\"      10/1: daughter in law at bedside. Patient doing well. Lying flat in bed. Has 4L nc on. Sounds wheezy in TIERRA posteriorly. Will give nebulizer tx. Daughter says he has inhalers at home but he doesn't use them.      POD1    Open treatment of the left hip fracture with  cephalomedullary nail with an accessory cannulated screw.     Per GI: CT A&P : multiple hepatic metastasis. Blanche metastasis also seen right pericardial phrenic space. Incompletely healed compression fracture L4. Right juxtacardiac adenopathy concerning for mets and new multiple hepatic masses. Planned for liver biopsy tomorrow with GI for further evaluation and discuss treatment and desired level of care at that point.       If negative then EGD/colon if patient wants full evaluation  Vasyl Garcia MD     Hgb dropped to 10.2 from 12.9 from OR yesterday. Cont to monitor. Interval History/Subjective: POD2. No clear primary on CT C/A/P. No plans for therapy per hem/onc, so no further diagnostic evaluation needed. GI signed off. Hem/onc, Dr. Nu Daniels saw the patient. No plan for biopsy and hem/onc care with be primarily supportive, as the patient expressed no desire to undergo chemo, immunotherapy, radiation other interventions. He stated he had lived a good life and at age 80 does not have to put himself through such treatments. Palliative care consult placed. They are going to pursue [facility] after rehab. He should do well in rehab as he was very active even mowing his own lawn, albeit with a riding , prior to admission. Takes care of his wife at home as well. Cr 1.4. Up from 1.3. monitor.   hgb 9.3 down from 10.2. monitor. Encourage incentive spirometry. On 4L nc now. Does not wear O2 at home. Had some wheezing yesterday and ordered breathing treatments and inhalers. He should use these at home on discharge but daughter says he doesn't want to get dependent on them out of being stoic. Discussed how this is helpful and necessary to help him breathe. Today less wheezing but still on 4L. Attempt to wean to > 94%    Review of Systems:     The patient denied headaches, visual changes, LOC, SOB, CP, ABD pain, N/V/D, skin changes, new or worsening weakness or neuromuscular deficits. Comprehensive ROS negative except as mentioned above. Past Medical History:        Diagnosis Date    Arthritis     BPH with obstruction/lower urinary tract symptoms     Dr. Dionne Armas Diverticular disease     Diverticulosis of sigmoid colon with h/o bleeding    Ectropion of right eye     Emphysema of lung (HCC)     PLASCENCIA    Hypercholesteremia     Macular degeneration     Dr. Jose Lafleur Moderate mitral regurgitation 03/14/2017    with mild Aortic Regurge and diastolic dysfunction    PONV (postoperative nausea and vomiting)     Squamous cell skin cancer     Nose and face    Vitamin D deficiency     Wears glasses        Past Surgical History:        Procedure Laterality Date    CATARACT REMOVAL WITH IMPLANT Bilateral     COLONOSCOPY  06/20/2016    MOHS SURGERY      Dr. Dimitrios Flores       Allergies:  Erythromycin base; Fentanyl; Penicillins; and Versed [midazolam]    Past medical and surgical history reviewed. Any changes have been noted. PHYSICAL EXAM:  /73   Pulse 89   Temp 98 °F (36.7 °C) (Oral)   Resp 15   Ht 6' (1.829 m)   Wt 156 lb 15.5 oz (71.2 kg)   SpO2 92%   BMI 21.29 kg/m²       Intake/Output Summary (Last 24 hours) at 10/2/2020 0916  Last data filed at 10/2/2020 8771  Gross per 24 hour   Intake 480 ml   Output 750 ml   Net -270 ml      General appearance:   No apparent distress, appears stated age. Cooperative. HEENT:  Normocephalic, atraumatic. PERRLA.  EOMi.  Conjunctivae/corneas clear, no icterus, non-injected. Neck: Supple, with full range of motion. No jugular venous distention. Trachea midline.   Respiratory: 4L nc wheezing cleared from TIERRA in posterior field, Normal respiratory effort. No rales or rhonchi  Cardiovascular:  Regular rate and rhythm without murmurs, rubs or gallops. Abdomen: Soft, non-tender, non-distended, without rebound or guarding. Normal bowel sounds. Musculoskeletal: post op dressing in place   Skin: Skin color, texture, turgor normal.  No rashes or lesions. Neurologic:  Neurovascularly intact without any focal sensory/motor deficits. Cranial nerves: II-XII intact, grossly intact. No facial asymmetry, tongue midline. Psychiatric:  Alert and oriented, thought content appropriate  Capillary Refill: Brisk,< 3 seconds   Peripheral Pulses: +2 palpable, equal bilaterally        LABS:    Lab Results   Component Value Date    WBC 8.4 10/02/2020    HGB 9.3 (L) 10/02/2020    HCT 27.4 (L) 10/02/2020    MCV 94.5 10/02/2020     10/02/2020    LYMPHOPCT 2.9 09/29/2020    RBC 2.90 (L) 10/02/2020    MCH 32.0 10/02/2020    MCHC 33.9 10/02/2020    RDW 13.2 10/02/2020       Lab Results   Component Value Date    CREATININE 1.4 (H) 10/02/2020    BUN 28 (H) 10/02/2020     10/02/2020    K 4.4 10/02/2020     10/02/2020    CO2 26 10/02/2020       No results found for: MG    Lab Results   Component Value Date    ALT 17 09/29/2020    AST 28 09/29/2020    ALKPHOS 116 09/29/2020    BILITOT 0.7 09/29/2020        No flowsheet data found. No results found for: LABA1C    Imaging:  CT ABDOMEN PELVIS W IV CONTRAST Additional Contrast? None   Final Result   Multiple hepatic metastasis. Blanche metastasis also seen in the right   pericardial phrenic space. Incompletely healed compression fracture L4. XR HIP LEFT (2-3 VIEWS)   Final Result   Intraprocedural fluoroscopic spot images as above. See separate procedure   report for more information. FLUORO FOR SURGICAL PROCEDURES   Final Result      CT CHEST WO CONTRAST   Final Result   1. No evidence of pneumonia   2.  Chronic right pleural effusion with pleural thickening and adjacent right   lower lobe atelectasis   3. Emphysema, chronic interstitial changes, stable left pulmonary nodules and   pleural plaques   4. Right juxta cardiac adenopathy concerning for metastatic disease   5. New hepatic metastatic disease   6. Hiatal hernia with gastroesophageal reflux         CT Cervical Spine WO Contrast   Final Result   No acute abnormality of the cervical spine. CT Head WO Contrast   Final Result   No acute intracranial abnormality. XR FEMUR LEFT (MIN 2 VIEWS)   Final Result   Acute fracture of the left hip. XR HIP 2-3 VW W PELVIS LEFT   Final Result   Acute fracture of the left hip. XR CHEST PORTABLE   Final Result   Airspace disease in the right lower lung, most compatible with pneumonia,   with a small parapneumonic effusion. There is sharp margination of the right heart border, which is new when   compared to the previous exam, raising the possibility of lobar collapse. Consider further evaluation with chest CT.          CT NEEDLE BIOPSY LIVER PERCUTANEOUS    (Results Pending)       Scheduled and prn Medications:    Scheduled Meds:   enoxaparin  40 mg Subcutaneous Daily    budesonide-formoterol  2 puff Inhalation BID    tiotropium  2 puff Inhalation Daily    levothyroxine  50 mcg Oral Daily    pantoprazole  40 mg Oral QAM AC    tamsulosin  0.4 mg Oral Daily    sodium chloride flush  10 mL Intravenous 2 times per day     Continuous Infusions:   sodium chloride 75 mL/hr at 10/01/20 2030    lactated ringers Stopped (10/01/20 0656)     PRN Meds:.morphine, HYDROcodone 5 mg - acetaminophen, ipratropium-albuterol, albuterol sulfate HFA, sodium chloride flush, promethazine **OR** ondansetron, potassium chloride, magnesium sulfate    Assessment & Plan:        Post-traumatic intertrochanteric fracture left hip  - consult Ortho Surg  - NPO  - covid pre-op screening neg  - EKG: Sinus tachycardia with 1st degree A-V block with Fusion complexes;' Nonspecific ST and T wave  - PT/OT

## 2020-10-02 NOTE — PROGRESS NOTES
Pt resting in bed at beginning of shift. He c/o intermittent L hip pain rated 6/10. He denies having any nausea, numbness, or tingling. Pulses palpable, skin warm to touch. He has moderate flexion in BLE. Dressing to L hip is CDI. Pt assisted with ordering breakfast. PRN Norco administered for L hip pain. Fall precautions in place, belongings within reach. Will continue to monitor and assess.

## 2020-10-02 NOTE — CONSULTS
facility-administered medications on file prior to encounter. Current Outpatient Medications on File Prior to Encounter   Medication Sig Dispense Refill    senna (SENOKOT) 8.6 MG tablet Take 1 tablet by mouth daily      Multiple Vitamins-Minerals (PRESERVISION AREDS 2 PO) Take by mouth 2 times daily      omeprazole (PRILOSEC) 40 MG delayed release capsule Take 1 capsule by mouth every morning (before breakfast) 30 capsule 5    tamsulosin (FLOMAX) 0.4 MG capsule TAKE ONE CAPSULE BY MOUTH DAILY 90 capsule 4    levothyroxine (SYNTHROID) 50 MCG tablet TAKE ONE TABLET BY MOUTH DAILY 90 tablet 3    finasteride (PROSCAR) 5 MG tablet TAKE ONE TABLET BY MOUTH DAILY 90 tablet 3    Cholecalciferol (VITAMIN D) 2000 UNITS CAPS capsule Take by mouth daily      fluticasone-umeclidin-vilant (TRELEGY ELLIPTA) 100-62.5-25 MCG/INH AEPB Inhale 1 puff into the lungs daily         Objective         /73   Pulse 89   Temp 98 °F (36.7 °C) (Oral)   Resp 15   Ht 6' (1.829 m)   Wt 156 lb 15.5 oz (71.2 kg)   SpO2 92%   BMI 21.29 kg/m²     Code Status: DNR-CCA    Advanced Directives: completed requested copy      Assessment        Management and Education    Persons available for education:       [x] Self     [] Caregiver       [] Spouse       [x] Other Family Member   []  Other    Spiritual History:  notified: Yes,     Does the patient have a Primary Care Physician?   Yes    Level of patient/caregiver understanding able to:        [x] Verbalize Understanding   [] Demonstrate Understanding       [] Teach Back       [] Needs Reinforcement     []  Other:      Teaching Time:  1hours  0 minutes     Plan        Social Service Consult Made:  Yes  Assistance filling out Living Will/HPOA:  No      Discharge Plan:  Education/support to family  Education/support to patient  Providing support for coping/adaptation/distress of family  Providing support for coping/adaptation/distress of patient  Clarification of medical condition to patient and family  Code status clarified: [facility]  Palliative care orders introduced  Provided information about hospice    Discharge Environment:  [x] Hospice/Palliative Consult Agency: provided list and they chose [facility] [address]  [] ARU with Hospice/Palliative care to follow     Discussion: Patient up in chair alert and oriented to person, place, time and situation. His daughter in [place]. 18. [phone] is present for conversation for goals of care. We have reviewed his new findings of possible cancer and they expressed understanding. He wants to go home with his wife after rehabbing frome his hip, he does not want to have any treatment for his possible cancer. We discussed hospice care and palliative care at home all questions answered. They agreed to hospice care referral and will meet with hospice today. They hope to go acute rehab soon. Referral sent to [address] [place] [address]     I will continue to follow Mr. Solorzano's care as needed. Thank you for allowing me to participate in the care of Mr. Nathan Joseph .      Electronically signed by Paulette Nelson RN, [address] [facility] on 10/2/2020 at 12:58 PM  [address] [place] [address]  Office: [phone]

## 2020-10-03 LAB
ANION GAP SERPL CALCULATED.3IONS-SCNC: 8 MMOL/L (ref 3–16)
BUN BLDV-MCNC: 22 MG/DL (ref 7–20)
CALCIUM SERPL-MCNC: 8.3 MG/DL (ref 8.3–10.6)
CHLORIDE BLD-SCNC: 105 MMOL/L (ref 99–110)
CO2: 24 MMOL/L (ref 21–32)
CREAT SERPL-MCNC: 0.9 MG/DL (ref 0.8–1.3)
GFR AFRICAN AMERICAN: >60
GFR NON-AFRICAN AMERICAN: >60
GLUCOSE BLD-MCNC: 127 MG/DL (ref 70–99)
HCT VFR BLD CALC: 28.1 % (ref 40.5–52.5)
HEMOGLOBIN: 9.5 G/DL (ref 13.5–17.5)
MCH RBC QN AUTO: 31.9 PG (ref 26–34)
MCHC RBC AUTO-ENTMCNC: 34 G/DL (ref 31–36)
MCV RBC AUTO: 93.9 FL (ref 80–100)
PDW BLD-RTO: 13 % (ref 12.4–15.4)
PLATELET # BLD: 181 K/UL (ref 135–450)
PMV BLD AUTO: 8.4 FL (ref 5–10.5)
POTASSIUM SERPL-SCNC: 4.7 MMOL/L (ref 3.5–5.1)
RBC # BLD: 2.99 M/UL (ref 4.2–5.9)
SODIUM BLD-SCNC: 137 MMOL/L (ref 136–145)
WBC # BLD: 8.4 K/UL (ref 4–11)

## 2020-10-03 PROCEDURE — 85027 COMPLETE CBC AUTOMATED: CPT

## 2020-10-03 PROCEDURE — 6360000002 HC RX W HCPCS: Performed by: ORTHOPAEDIC SURGERY

## 2020-10-03 PROCEDURE — 1200000000 HC SEMI PRIVATE

## 2020-10-03 PROCEDURE — 6370000000 HC RX 637 (ALT 250 FOR IP): Performed by: INTERNAL MEDICINE

## 2020-10-03 PROCEDURE — 94640 AIRWAY INHALATION TREATMENT: CPT

## 2020-10-03 PROCEDURE — 2580000003 HC RX 258: Performed by: INTERNAL MEDICINE

## 2020-10-03 PROCEDURE — 80048 BASIC METABOLIC PNL TOTAL CA: CPT

## 2020-10-03 RX ADMIN — ENOXAPARIN SODIUM 40 MG: 40 INJECTION SUBCUTANEOUS at 08:43

## 2020-10-03 RX ADMIN — BUDESONIDE AND FORMOTEROL FUMARATE DIHYDRATE 2 PUFF: 80; 4.5 AEROSOL RESPIRATORY (INHALATION) at 19:59

## 2020-10-03 RX ADMIN — SODIUM CHLORIDE, PRESERVATIVE FREE 10 ML: 5 INJECTION INTRAVENOUS at 21:23

## 2020-10-03 RX ADMIN — PANTOPRAZOLE SODIUM 40 MG: 40 TABLET, DELAYED RELEASE ORAL at 05:43

## 2020-10-03 RX ADMIN — TAMSULOSIN HYDROCHLORIDE 0.4 MG: 0.4 CAPSULE ORAL at 08:43

## 2020-10-03 RX ADMIN — LEVOTHYROXINE SODIUM 50 MCG: 0.05 TABLET ORAL at 05:43

## 2020-10-03 ASSESSMENT — PAIN SCALES - GENERAL: PAINLEVEL_OUTOF10: 0

## 2020-10-03 NOTE — CARE COORDINATION
Flores left message for Esperanza on acute rehab regarding patient's dc today. Per rehab Sw on 10/2/20--Dr Swapna Frederick plans for a Sunday admit due to continued O2 needs and creatinine. Electronically signed by Zaheer George on 10/3/2020 at 11:28 AM    1:40 PM  Flores received call back from Esperanza--she confirmed patient would be admitted to acute rehab tomorrow. RN aware.      Electronically signed by Zaheer George on 10/3/2020 at 1:40 PM

## 2020-10-03 NOTE — PROGRESS NOTES
Vital signs stable, patient has been performing I. S. while awake tonight, tolerating well, charted. Resting in bed, no c/o, call light in reach, will continue to monitor.

## 2020-10-03 NOTE — PROGRESS NOTES
Checking on patient Q2H for nutrition needs, hygiene needs, comfort measures, mobility, fall risk interventions, and safe environment. All precautions and interventions in place. Educated patient on use of call light and telephone. Patient verbalizes understanding. Call light/telephone in reach.   Electronically signed by Jaja Viramontes RN on 10/3/2020 at 7:56 AM

## 2020-10-03 NOTE — PLAN OF CARE
assessed. Patient able to ambulate and turn self and repositioned patient Q2H and assessed skin. Educated patient on importance of repositioning to prevent skin issues.      10/3/2020 0626 by Maryann Alfonso RN  Outcome: Ongoing

## 2020-10-03 NOTE — DISCHARGE SUMMARY
Hospital Medicine Discharge Summary    Patient ID: Robby Jiménez      Patient's PCP: Tyler Panda MD    Admit Date: 9/29/2020     Discharge Date:   10/3/2020    Admitting Physician: Ravinder Draper MD     Discharge Physician: RANDA Duke - CNP     Discharge Diagnoses: Active Hospital Problems    Diagnosis Date Noted    Age-related osteoporosis with current pathological fracture [M80.00XA] 09/30/2020    Fracture of femoral neck, left, closed ([facility] [S72.002A] 09/30/2020    Closed fracture of femur, intertrochanteric, left, initial encounter ([facility] [address] [S72.142A]     Closed pertrochanteric fracture of femur, left, initial encounter ([facility] [address] [S72.102A] 09/29/2020    Moderate COPD (chronic obstructive pulmonary disease) ([address] [J44.9]        The patient was seen and examined on day of discharge and this discharge summary is in conjunction with any daily progress note from day of discharge. Hospital Course:     A 80year old  male who presents to the hospital, status post mechanical fall when he was turning around,  trying to grab on to something and lost his balance followed by excruciating pain in the left hip and inability to get up from the  ground without any nausea, vomiting, fevers, or chills. Patient had ORIF on 9/30. In the emergency room he did have a CT which showed chronic right pleural effusion with pleural thickening and adjacent right lower lobe atelectasis, emphysema, chronic interstitial changes, stable left pulm nodules and pleural plaques. New hepatic metastatic disease.  Right juxta cardiac adenopathy concerning for metastatic disease. Hiatal hernia with GERD      CXR with airspace disease RLL; recommend CT. CT chest neg for PNA; chronic right pleural effusion with pleural thickening and adjacent right lower lobe atelectasis, emphysema, chronic interstitial changes, stable left pulm nodules and pleural plaques.  New hepatic metastatic disease.  Right juxta cardiac adenopathy concerning for metastatic disease. Hiatal hernia with GERD. Patient was evaluated by hematology Dr. Nu Daniels, and is elected not to do any treatment. Patient initially did have oxygen dependency at 4 L but has currently been weaned to room air. His sats are currently 98% on room air. Palliative care consult was completed. The patient will go to acute rehab first and then hospice on discharge. Patient was seen and examined today. He is stable for discharge to [address] [place] [address]. Patient not appropriate for rehab. He is at high risk for readmission and hospice should be addressed again strongly. He is appropriate for hospice.   Electronically signed by Michelet York MD on 10/3/2020 at 2:26 PM      Post-traumatic intertrochanteric fracture left hip  - consult Ortho Surg  - covid pre-op screening neg  - EKG: Sinus tachycardia with 1st degree A-V block with Fusion complexes;' Nonspecific ST and T wave  - PT/OT post-op   - social work consulted- to ARU then home hospice with [facility]  - analgesics PRN  - POD#3      COPD  - cont home meds: trelegy  - O2 PRN; on RA  - CXR : airspace disease RLL; CT recommended by radiology  - WBC 14.8  - CT chest: no pneumonia; chronic right pleural effusion with pleural thickening and adjacent right lower lobe atelectasis, emphysema, chronic interstitial changes, stable left pulmonary nodules and pleural plaques     HTN   - cont home meds     HLD  - cont home meds     Thyroid dysfunction  - cont synthroid     New liver mets  No known primary or previous hx CA  - Consulted GI--Will plan CT A&P for further evaluation for primary.  If negative then EGD/colon if patient wants full evaluation -- GI signed off since no further w/u needed   - pt choosing to forego biopsy since he does not want treatment for CA  - hem/onc to provide supportive care-Dr. Nu Daniels  - Consult to Palliative Care-completed CODE STATUS DNR CCA, will go to the [facility] for acute rehab and will go home hospice      Exam:     BP (!) 148/83   Pulse 99   Temp 97.8 °F (36.6 °C) (Oral)   Resp 16   Ht 6' (1.829 m)   Wt 158 lb 4.6 oz (71.8 kg)   SpO2 (!) 88%   BMI 21.47 kg/m²     General appearance:   No apparent distress, appears stated age. Cooperative. HEENT:  Normocephalic, atraumatic. PERRLA.  EOMi.  Conjunctivae/corneas clear, no icterus, non-injected. Neck: Supple, with full range of motion. No jugular venous distention. Trachea midline. Respiratory: Currently on room air wheezing cleared from TIERRA in posterior field, Normal respiratory effort. No rales or rhonchi  Cardiovascular:  Regular rate and rhythm without murmurs, rubs or gallops. Abdomen: Soft, non-tender, non-distended, without rebound or guarding. Normal bowel sounds. Musculoskeletal: post op dressing in place , able to wiggle toes,   skin: Skin color, texture, turgor normal.  No rashes or lesions. Neurologic:  Neurovascularly intact without any focal sensory/motor deficits. Cranial nerves: II-XII intact, grossly intact. No facial asymmetry, tongue midline. Psychiatric:  Alert and oriented, thought content appropriate  Capillary Refill: Brisk,< 3 seconds   Peripheral Pulses: +2 palpable, equal bilaterally       Consults:     IP CONSULT TO SOCIAL WORK  IP CONSULT TO GI  IP CONSULT TO PHYSICAL MEDICINE REHAB  IP CONSULT TO ONCOLOGY  IP CONSULT TO PALLIATIVE CARE  IP CONSULT TO HOSPICE    Significant Diagnostic Studies:     CT ABDOMEN PELVIS W IV CONTRAST Additional Contrast? None   Final Result   Multiple hepatic metastasis. Blanche metastasis also seen in the right   pericardial phrenic space. Incompletely healed compression fracture L4. XR HIP LEFT (2-3 VIEWS)   Final Result   Intraprocedural fluoroscopic spot images as above. See separate procedure   report for more information. FLUORO FOR SURGICAL PROCEDURES   Final Result      CT CHEST WO CONTRAST   Final Result   1. No evidence of pneumonia   2. Chronic right pleural effusion with pleural thickening and adjacent right   lower lobe atelectasis   3. Emphysema, chronic interstitial changes, stable left pulmonary nodules and   pleural plaques   4. Right juxta cardiac adenopathy concerning for metastatic disease   5. New hepatic metastatic disease   6. Hiatal hernia with gastroesophageal reflux         CT Cervical Spine WO Contrast   Final Result   No acute abnormality of the cervical spine. CT Head WO Contrast   Final Result   No acute intracranial abnormality. XR FEMUR LEFT (MIN 2 VIEWS)   Final Result   Acute fracture of the left hip. XR HIP 2-3 VW W PELVIS LEFT   Final Result   Acute fracture of the left hip. XR CHEST PORTABLE   Final Result   Airspace disease in the right lower lung, most compatible with pneumonia,   with a small parapneumonic effusion. There is sharp margination of the right heart border, which is new when   compared to the previous exam, raising the possibility of lobar collapse. Consider further evaluation with chest CT. Disposition:  ARU, then home with hospice [facility]    Condition at Discharge: Stable    Discharge Instructions/Follow-up: Follow-up with Dr. Meryle Christen in 2 weeks    Code Status:  DNR-CCA     Activity: activity as tolerated    Diet: regular diet      Labs:  For convenience and continuity at follow-up the following most recent labs are provided:      CBC:    Lab Results   Component Value Date    WBC 8.4 10/03/2020    HGB 9.5 10/03/2020    HCT 28.1 10/03/2020     10/03/2020       Renal:    Lab Results   Component Value Date     10/03/2020    K 4.7 10/03/2020    K 4.9 09/29/2020     10/03/2020    CO2 24 10/03/2020    BUN 22 10/03/2020    CREATININE 0.9 10/03/2020    CALCIUM 8.3 10/03/2020       Discharge Medications:     Current Discharge Medication List           Details   HYDROcodone-acetaminophen (NORCO) 5-325 MG per tablet Take 1 tablet by mouth every 6 hours as needed for Pain for up to 5 days. Qty: 20 tablet, Refills: 0    Comments: Reduce doses taken as pain becomes manageable  Associated Diagnoses: Closed nondisplaced intertrochanteric fracture of left femur, initial encounter ([facility] [address] Hepatic metastases ([facility])      aspirin EC 81 MG EC tablet Take 1 tablet by mouth 2 times daily Take for DVT blood clot prophylaxis. Please avoid missing doses. Qty: 60 tablet, Refills: 0              Details   Multiple Vitamins-Minerals (PRESERVISION AREDS 2 PO) Take by mouth 2 times daily      omeprazole (PRILOSEC) 40 MG delayed release capsule Take 1 capsule by mouth every morning (before breakfast)  Qty: 30 capsule, Refills: 5      tamsulosin (FLOMAX) 0.4 MG capsule TAKE ONE CAPSULE BY MOUTH DAILY  Qty: 90 capsule, Refills: 4      levothyroxine (SYNTHROID) 50 MCG tablet TAKE ONE TABLET BY MOUTH DAILY  Qty: 90 tablet, Refills: 3      finasteride (PROSCAR) 5 MG tablet TAKE ONE TABLET BY MOUTH DAILY  Qty: 90 tablet, Refills: 3      Cholecalciferol (VITAMIN D) 2000 UNITS CAPS capsule Take by mouth daily      fluticasone-umeclidin-vilant (TRELEGY ELLIPTA) 100-62.5-25 MCG/INH AEPB Inhale 1 puff into the lungs daily             No future appointments. Time Spent on discharge is more than 45 minutes in the examination, evaluation, counseling and review of medications and discharge plan. Signed:    RANDA Conde - CNP   10/3/2020      Thank you Grace Oro MD for the opportunity to be involved in this patient's care. If you have any questions or concerns please feel free to contact me at [phone].

## 2020-10-04 ENCOUNTER — HOSPITAL ENCOUNTER (INPATIENT)
Age: 85
LOS: 3 days | Discharge: HOSPICE | DRG: 559 | End: 2020-10-07
Attending: PHYSICAL MEDICINE & REHABILITATION | Admitting: PHYSICAL MEDICINE & REHABILITATION
Payer: MEDICARE

## 2020-10-04 VITALS
WEIGHT: 158.29 LBS | BODY MASS INDEX: 21.44 KG/M2 | SYSTOLIC BLOOD PRESSURE: 120 MMHG | RESPIRATION RATE: 16 BRPM | HEART RATE: 96 BPM | OXYGEN SATURATION: 90 % | DIASTOLIC BLOOD PRESSURE: 65 MMHG | TEMPERATURE: 98.7 F | HEIGHT: 72 IN

## 2020-10-04 PROBLEM — S72.91XS CLOSED FRACTURE OF RIGHT FEMUR, SEQUELA: Status: ACTIVE | Noted: 2020-10-04

## 2020-10-04 LAB
ANION GAP SERPL CALCULATED.3IONS-SCNC: 8 MMOL/L (ref 3–16)
BUN BLDV-MCNC: 24 MG/DL (ref 7–20)
CALCIUM SERPL-MCNC: 8.5 MG/DL (ref 8.3–10.6)
CHLORIDE BLD-SCNC: 105 MMOL/L (ref 99–110)
CO2: 26 MMOL/L (ref 21–32)
CREAT SERPL-MCNC: 0.9 MG/DL (ref 0.8–1.3)
GFR AFRICAN AMERICAN: >60
GFR NON-AFRICAN AMERICAN: >60
GLUCOSE BLD-MCNC: 132 MG/DL (ref 70–99)
HCT VFR BLD CALC: 27.9 % (ref 40.5–52.5)
HEMOGLOBIN: 9.6 G/DL (ref 13.5–17.5)
MCH RBC QN AUTO: 32.1 PG (ref 26–34)
MCHC RBC AUTO-ENTMCNC: 34.3 G/DL (ref 31–36)
MCV RBC AUTO: 93.6 FL (ref 80–100)
PDW BLD-RTO: 13.2 % (ref 12.4–15.4)
PLATELET # BLD: 220 K/UL (ref 135–450)
PMV BLD AUTO: 8.4 FL (ref 5–10.5)
POTASSIUM SERPL-SCNC: 4.1 MMOL/L (ref 3.5–5.1)
RBC # BLD: 2.98 M/UL (ref 4.2–5.9)
SODIUM BLD-SCNC: 139 MMOL/L (ref 136–145)
WBC # BLD: 9.4 K/UL (ref 4–11)

## 2020-10-04 PROCEDURE — 6370000000 HC RX 637 (ALT 250 FOR IP): Performed by: INTERNAL MEDICINE

## 2020-10-04 PROCEDURE — 85027 COMPLETE CBC AUTOMATED: CPT

## 2020-10-04 PROCEDURE — 6360000002 HC RX W HCPCS: Performed by: ORTHOPAEDIC SURGERY

## 2020-10-04 PROCEDURE — 1280000000 HC REHAB R&B

## 2020-10-04 PROCEDURE — 94760 N-INVAS EAR/PLS OXIMETRY 1: CPT

## 2020-10-04 PROCEDURE — 94640 AIRWAY INHALATION TREATMENT: CPT

## 2020-10-04 PROCEDURE — 6370000000 HC RX 637 (ALT 250 FOR IP): Performed by: PHYSICAL MEDICINE & REHABILITATION

## 2020-10-04 PROCEDURE — 36415 COLL VENOUS BLD VENIPUNCTURE: CPT

## 2020-10-04 PROCEDURE — 2580000003 HC RX 258: Performed by: PHYSICAL MEDICINE & REHABILITATION

## 2020-10-04 PROCEDURE — 80048 BASIC METABOLIC PNL TOTAL CA: CPT

## 2020-10-04 RX ORDER — DIPHENHYDRAMINE HCL 25 MG
25 TABLET ORAL EVERY 6 HOURS PRN
Status: CANCELLED | OUTPATIENT
Start: 2020-10-04

## 2020-10-04 RX ORDER — HYDROCODONE BITARTRATE AND ACETAMINOPHEN 5; 325 MG/1; MG/1
1 TABLET ORAL EVERY 6 HOURS PRN
Status: DISCONTINUED | OUTPATIENT
Start: 2020-10-04 | End: 2020-10-07 | Stop reason: HOSPADM

## 2020-10-04 RX ORDER — SODIUM CHLORIDE 0.9 % (FLUSH) 0.9 %
10 SYRINGE (ML) INJECTION PRN
Status: DISCONTINUED | OUTPATIENT
Start: 2020-10-04 | End: 2020-10-07 | Stop reason: HOSPADM

## 2020-10-04 RX ORDER — TRAZODONE HYDROCHLORIDE 50 MG/1
50 TABLET ORAL NIGHTLY PRN
Status: CANCELLED | OUTPATIENT
Start: 2020-10-04

## 2020-10-04 RX ORDER — HYDRALAZINE HYDROCHLORIDE 50 MG/1
50 TABLET, FILM COATED ORAL EVERY 8 HOURS PRN
Status: DISCONTINUED | OUTPATIENT
Start: 2020-10-04 | End: 2020-10-07 | Stop reason: HOSPADM

## 2020-10-04 RX ORDER — ALBUTEROL SULFATE 90 UG/1
2 AEROSOL, METERED RESPIRATORY (INHALATION) EVERY 6 HOURS PRN
Status: CANCELLED | OUTPATIENT
Start: 2020-10-04

## 2020-10-04 RX ORDER — ONDANSETRON 4 MG/1
4 TABLET, ORALLY DISINTEGRATING ORAL EVERY 8 HOURS PRN
Status: DISCONTINUED | OUTPATIENT
Start: 2020-10-04 | End: 2020-10-07 | Stop reason: HOSPADM

## 2020-10-04 RX ORDER — SODIUM CHLORIDE 0.9 % (FLUSH) 0.9 %
10 SYRINGE (ML) INJECTION EVERY 12 HOURS SCHEDULED
Status: DISCONTINUED | OUTPATIENT
Start: 2020-10-04 | End: 2020-10-07 | Stop reason: HOSPADM

## 2020-10-04 RX ORDER — IPRATROPIUM BROMIDE AND ALBUTEROL SULFATE 2.5; .5 MG/3ML; MG/3ML
1 SOLUTION RESPIRATORY (INHALATION) EVERY 4 HOURS PRN
Status: DISCONTINUED | OUTPATIENT
Start: 2020-10-04 | End: 2020-10-07 | Stop reason: HOSPADM

## 2020-10-04 RX ORDER — BUDESONIDE AND FORMOTEROL FUMARATE DIHYDRATE 80; 4.5 UG/1; UG/1
2 AEROSOL RESPIRATORY (INHALATION) 2 TIMES DAILY
Status: DISCONTINUED | OUTPATIENT
Start: 2020-10-04 | End: 2020-10-07 | Stop reason: HOSPADM

## 2020-10-04 RX ORDER — PANTOPRAZOLE SODIUM 40 MG/1
40 TABLET, DELAYED RELEASE ORAL
Status: CANCELLED | OUTPATIENT
Start: 2020-10-05

## 2020-10-04 RX ORDER — TAMSULOSIN HYDROCHLORIDE 0.4 MG/1
0.4 CAPSULE ORAL DAILY
Status: DISCONTINUED | OUTPATIENT
Start: 2020-10-05 | End: 2020-10-07 | Stop reason: HOSPADM

## 2020-10-04 RX ORDER — BUDESONIDE AND FORMOTEROL FUMARATE DIHYDRATE 80; 4.5 UG/1; UG/1
2 AEROSOL RESPIRATORY (INHALATION) 2 TIMES DAILY
Status: CANCELLED | OUTPATIENT
Start: 2020-10-04

## 2020-10-04 RX ORDER — SODIUM CHLORIDE 0.9 % (FLUSH) 0.9 %
10 SYRINGE (ML) INJECTION PRN
Status: CANCELLED | OUTPATIENT
Start: 2020-10-04

## 2020-10-04 RX ORDER — TAMSULOSIN HYDROCHLORIDE 0.4 MG/1
0.4 CAPSULE ORAL DAILY
Status: CANCELLED | OUTPATIENT
Start: 2020-10-05

## 2020-10-04 RX ORDER — LEVOTHYROXINE SODIUM 0.05 MG/1
50 TABLET ORAL DAILY
Status: CANCELLED | OUTPATIENT
Start: 2020-10-05

## 2020-10-04 RX ORDER — HYDROCODONE BITARTRATE AND ACETAMINOPHEN 5; 325 MG/1; MG/1
1 TABLET ORAL EVERY 6 HOURS PRN
Status: CANCELLED | OUTPATIENT
Start: 2020-10-04

## 2020-10-04 RX ORDER — ACETAMINOPHEN 325 MG/1
650 TABLET ORAL EVERY 4 HOURS PRN
Status: CANCELLED | OUTPATIENT
Start: 2020-10-04

## 2020-10-04 RX ORDER — SENNA AND DOCUSATE SODIUM 50; 8.6 MG/1; MG/1
2 TABLET, FILM COATED ORAL DAILY
Status: DISCONTINUED | OUTPATIENT
Start: 2020-10-04 | End: 2020-10-07 | Stop reason: HOSPADM

## 2020-10-04 RX ORDER — IPRATROPIUM BROMIDE AND ALBUTEROL SULFATE 2.5; .5 MG/3ML; MG/3ML
1 SOLUTION RESPIRATORY (INHALATION) EVERY 4 HOURS PRN
Status: CANCELLED | OUTPATIENT
Start: 2020-10-04

## 2020-10-04 RX ORDER — LEVOTHYROXINE SODIUM 0.05 MG/1
50 TABLET ORAL DAILY
Status: DISCONTINUED | OUTPATIENT
Start: 2020-10-05 | End: 2020-10-07 | Stop reason: HOSPADM

## 2020-10-04 RX ORDER — SODIUM CHLORIDE 0.9 % (FLUSH) 0.9 %
10 SYRINGE (ML) INJECTION EVERY 12 HOURS SCHEDULED
Status: CANCELLED | OUTPATIENT
Start: 2020-10-04

## 2020-10-04 RX ORDER — ONDANSETRON 4 MG/1
4 TABLET, ORALLY DISINTEGRATING ORAL EVERY 8 HOURS PRN
Status: CANCELLED | OUTPATIENT
Start: 2020-10-04

## 2020-10-04 RX ORDER — HYDRALAZINE HYDROCHLORIDE 50 MG/1
50 TABLET, FILM COATED ORAL EVERY 8 HOURS PRN
Status: CANCELLED | OUTPATIENT
Start: 2020-10-04

## 2020-10-04 RX ORDER — ALBUTEROL SULFATE 90 UG/1
2 AEROSOL, METERED RESPIRATORY (INHALATION) EVERY 6 HOURS PRN
Status: DISCONTINUED | OUTPATIENT
Start: 2020-10-04 | End: 2020-10-07 | Stop reason: HOSPADM

## 2020-10-04 RX ORDER — ACETAMINOPHEN 325 MG/1
650 TABLET ORAL EVERY 4 HOURS PRN
Status: DISCONTINUED | OUTPATIENT
Start: 2020-10-04 | End: 2020-10-07 | Stop reason: HOSPADM

## 2020-10-04 RX ORDER — DIPHENHYDRAMINE HCL 25 MG
25 TABLET ORAL EVERY 6 HOURS PRN
Status: DISCONTINUED | OUTPATIENT
Start: 2020-10-04 | End: 2020-10-07 | Stop reason: HOSPADM

## 2020-10-04 RX ORDER — TRAZODONE HYDROCHLORIDE 50 MG/1
50 TABLET ORAL NIGHTLY PRN
Status: DISCONTINUED | OUTPATIENT
Start: 2020-10-04 | End: 2020-10-07 | Stop reason: HOSPADM

## 2020-10-04 RX ORDER — PANTOPRAZOLE SODIUM 40 MG/1
40 TABLET, DELAYED RELEASE ORAL
Status: DISCONTINUED | OUTPATIENT
Start: 2020-10-05 | End: 2020-10-07 | Stop reason: HOSPADM

## 2020-10-04 RX ADMIN — LEVOTHYROXINE SODIUM 50 MCG: 0.05 TABLET ORAL at 06:20

## 2020-10-04 RX ADMIN — PANTOPRAZOLE SODIUM 40 MG: 40 TABLET, DELAYED RELEASE ORAL at 06:20

## 2020-10-04 RX ADMIN — TAMSULOSIN HYDROCHLORIDE 0.4 MG: 0.4 CAPSULE ORAL at 08:58

## 2020-10-04 RX ADMIN — BUDESONIDE AND FORMOTEROL FUMARATE DIHYDRATE 2 PUFF: 80; 4.5 AEROSOL RESPIRATORY (INHALATION) at 19:56

## 2020-10-04 RX ADMIN — TIOTROPIUM BROMIDE INHALATION SPRAY 2 PUFF: 3.12 SPRAY, METERED RESPIRATORY (INHALATION) at 08:31

## 2020-10-04 RX ADMIN — DOCUSATE SODIUM 50 MG AND SENNOSIDES 8.6 MG 2 TABLET: 8.6; 5 TABLET, FILM COATED ORAL at 17:41

## 2020-10-04 RX ADMIN — Medication 10 ML: at 19:50

## 2020-10-04 RX ADMIN — ENOXAPARIN SODIUM 40 MG: 40 INJECTION SUBCUTANEOUS at 08:58

## 2020-10-04 RX ADMIN — BUDESONIDE AND FORMOTEROL FUMARATE DIHYDRATE 2 PUFF: 80; 4.5 AEROSOL RESPIRATORY (INHALATION) at 08:31

## 2020-10-04 ASSESSMENT — PAIN SCALES - GENERAL
PAINLEVEL_OUTOF10: 0
PAINLEVEL_OUTOF10: 0

## 2020-10-04 NOTE — PLAN OF CARE
Problem: Pain:  Goal: Pain level will decrease  Description: Pain level will decrease  Outcome: Ongoing  Note: Educated patient on pain management. Will assess patients pain level per unit protocol, and provide pain management measures as needed. Goal: Control of acute pain  Description: Control of acute pain  Outcome: Ongoing  Note: Patient educated on acute pain. Taught patient to use call light to ask for pain medication. PRN pain medication given for acute pain. Will continue to monitor pain per unit protocol. Goal: Control of chronic pain  Description: Control of chronic pain  Outcome: Ongoing  Note: Patient educated on chronic pain. Taught patient to use call light to ask for pain medication. Will continue to monitor pain per unit protocol. Problem: Falls - Risk of:  Goal: Will remain free from falls  Description: Will remain free from falls  Outcome: Ongoing  Note: Patient educated on fall prevention. Call light is within reach, bed locked in lowest position, personal items within reach, and bed alarm is on. Will round on patient per unit guidelines. Goal: Absence of physical injury  Description: Absence of physical injury  Outcome: Ongoing  Note: Pt assessed for fall risk and fall precautions put into place. Bed in lowest position and wheels locked, call light within reach. Nonskid footwear in place. Patient educated on appropriate method of transfer and to call for assistance. Problem: Skin Integrity:  Goal: Will show no infection signs and symptoms  Description: Will show no infection signs and symptoms  Outcome: Ongoing  Note: Will monitor skin and mucous members. Will turn patient every 2 hours, monitor for friction and sheering, and change dressings as needed. Will preform skin assessment every shift. Goal: Absence of new skin breakdown  Description: Absence of new skin breakdown  Outcome: Ongoing  Note: Will monitor skin and mucous members.   Will turn patient

## 2020-10-04 NOTE — PLAN OF CARE
improve  Description: Diagnostic test results will improve  Outcome: Ongoing     Problem: Self-Care:  Goal: Ability to meet self-care needs will improve  Description: Ability to meet self-care needs will improve  Outcome: Ongoing     Problem: Self-Concept:  Goal: Ability to maintain and perform role responsibilities to the fullest extent possible will improve  Description: Ability to maintain and perform role responsibilities to the fullest extent possible will improve  Outcome: Ongoing  Goal: Verbalizations of decreased anxiety will increase  Description: Verbalizations of decreased anxiety will increase  Outcome: Ongoing     Problem: Urinary Elimination:  Goal: Ability to reestablish a normal urinary elimination pattern will improve - after catheter removal  Description: Ability to reestablish a normal urinary elimination pattern will improve  Outcome: Ongoing     Problem: Urinary Elimination:  Goal: Ability to reestablish a normal urinary elimination pattern will improve - after catheter removal  Description: Ability to reestablish a normal urinary elimination pattern will improve  Outcome: Ongoing

## 2020-10-04 NOTE — PROGRESS NOTES
Patient admitted to room 3259 per . Patient was oriented to the Call Light, Phone, TV, Thermostat, Bed Controls, Bathroom and Emergency Cord. Patient verbalized and demonstrated understanding of all. Patient was also given an over view of Unit Routines for Acute Rehab, including what to wear for therapy. The patient's role in goal setting was reviewed along with an explanation of the Interdisciplinary Team meeting, the 's role in coordinating services and the Discharge Planning/Continuum of Care process. Patient Rights and Responsibilities were reviewed. Meal times were explained, including how to order food. The white board (used for communication) was pointed out emphasizing  the 3 hours/day Therapy Schedule (posted most evenings), the number (and process) for reporting grievances, and the Doctor's, Nurse's, and PCA's names.  It was recommended that any family that will be care givers or any care givers the patient has, take part in theray

## 2020-10-04 NOTE — PLAN OF CARE
Problem: Pain:  Goal: Pain level will decrease  Description: Pain level will decrease  10/4/2020 0714 by Marcel Patel RN  Outcome: Ongoing  Note: Pain /discomfort being managed with PRN analgesics per MD orders. Patient able to express presence and absence of pain and rate pain appropriately using numerical scale. 10/3/2020 2306 by Héctor Rodriguez RN  Outcome: Ongoing  Note: Educated patient on pain management. Will assess patients pain level per unit protocol, and provide pain management measures as needed. Goal: Control of acute pain  Description: Control of acute pain  10/4/2020 0714 by Marcel Patel RN  Outcome: Ongoing  10/3/2020 2306 by Héctor Rodriguez RN  Outcome: Ongoing  Note: Patient educated on acute pain. Taught patient to use call light to ask for pain medication. PRN pain medication given for acute pain. Will continue to monitor pain per unit protocol. Goal: Control of chronic pain  Description: Control of chronic pain  10/4/2020 0714 by Marcel Patel RN  Outcome: Ongoing  10/3/2020 2306 by Héctor Rodriguez RN  Outcome: Ongoing  Note: Patient educated on chronic pain. Taught patient to use call light to ask for pain medication. Will continue to monitor pain per unit protocol. Problem: Falls - Risk of:  Goal: Will remain free from falls  Description: Will remain free from falls  10/4/2020 0714 by Marcel Patel RN  Outcome: Ongoing  Note: Fall risk assessment completed . Fall precautions in place, bed alarm on, side rails 2/4 up, call light in reach, educated pt on calling for assistance when needed, room clear of clutter. Pt verbalized understanding. 10/3/2020 2306 by Héctor Rodriguez RN  Outcome: Ongoing  Note: Patient educated on fall prevention. Call light is within reach, bed locked in lowest position, personal items within reach, and bed alarm is on. Will round on patient per unit guidelines.     Goal: Absence of physical injury  Description: Absence of physical injury  10/4/2020 7871 by Marcel Patel RN  Outcome: Ongoing  10/3/2020 2306 by Héctor Rodriguez RN  Outcome: Ongoing  Note: Pt assessed for fall risk and fall precautions put into place. Bed in lowest position and wheels locked, call light within reach. Nonskid footwear in place. Patient educated on appropriate method of transfer and to call for assistance. Problem: Skin Integrity:  Goal: Will show no infection signs and symptoms  Description: Will show no infection signs and symptoms  10/4/2020 0714 by Marcel Patel RN  Outcome: Ongoing  Note: Patient is alert and oriented, afebrile, has manageable complaints of pain, skin is intact and appropriate for ethnicity in color    10/3/2020 2306 by Héctor Rodriguez RN  Outcome: Ongoing  Note: Will monitor skin and mucous members. Will turn patient every 2 hours, monitor for friction and sheering, and change dressings as needed. Will preform skin assessment every shift. Goal: Absence of new skin breakdown  Description: Absence of new skin breakdown  10/4/2020 0714 by Marcel Patel RN  Outcome: Ongoing  10/3/2020 2306 by Héctor Rodriguez RN  Outcome: Ongoing  Note: Will monitor skin and mucous members. Will turn patient every 2 hours, monitor for friction and sheering, and change dressings as needed. Will preform skin assessment every shift.

## 2020-10-04 NOTE — PROGRESS NOTES
4 Eyes Skin Assessment     NAME:  Crys Smith  YOB: 1927  MEDICAL RECORD NUMBER:  [id number]    The patient is being assess for  Transfer to New Unit    I agree that 2 RN's have performed a thorough Head to Toe Skin Assessment on the patient. ALL assessment sites listed below have been assessed. Areas assessed by both nurses: Salud RN, [facility] RN    Head, Face, Ears, Shoulders, Back, Chest, Arms, Elbows, Hands, Sacrum. Buttock, Coccyx, Ischium and Legs. Feet and Heels        Does the Patient have a Wound? No noted wound(s);  See flowsheet for skin and hip incision assessment       Jethro Prevention initiated:  Yes   Wound Care Orders initiated:  No    Pressure Injury (Stage 3,4, Unstageable, DTI, NWPT, and Complex wounds) if present place consult order under [de-identified] NA    New and Established Ostomies if present place consult order under : NA      Nurse 1 eSignature: Electronically signed by Moon Perry RN on 10/4/20 at [address] PM EDT    **SHARE this note so that the co-signing nurse is able to place an eSignature**    Nurse 2 eSignature: Electronically signed by Angelia Addison RN on 10/4/20 at 5:04 PM EDT

## 2020-10-04 NOTE — CARE COORDINATION
10/4 spoke with Esperanza with [facility]-  MD will accept patient today.   Electronically signed by Jaxon Siegel on 10/4/2020 at 11:31 AM

## 2020-10-04 NOTE — DISCHARGE SUMMARY
Hospital Medicine Discharge Summary    Patient ID: Dung Mayer      Patient's PCP: Willy Tafoya MD    Admit Date: 9/29/2020     Discharge Date:   10/4/2020    Admitting Physician: Raymond Mejia MD     Discharge Physician: RANDA Barone - CNP     Discharge Diagnoses: Active Hospital Problems    Diagnosis Date Noted    Age-related osteoporosis with current pathological fracture [M80.00XA] 09/30/2020    Fracture of femoral neck, left, closed ([facility] [S72.002A] 09/30/2020    Closed fracture of femur, intertrochanteric, left, initial encounter ([facility] [address] [S72.142A]     Closed pertrochanteric fracture of femur, left, initial encounter ([facility] [address] [S72.102A] 09/29/2020    Moderate COPD (chronic obstructive pulmonary disease) ([facility] [address] [J44.9]        The patient was seen and examined on day of discharge and this discharge summary is in conjunction with any daily progress note from day of discharge. Hospital Course:     A 80year old  male who presents to the hospital, status post mechanical fall when he was turning around,  trying to grab on to something and lost his balance followed by excruciating pain in the left hip and inability to get up from the  ground without any nausea, vomiting, fevers, or chills. Patient had ORIF on 9/30. In the emergency room he did have a CT which showed chronic right pleural effusion with pleural thickening and adjacent right lower lobe atelectasis, emphysema, chronic interstitial changes, stable left pulm nodules and pleural plaques. New hepatic metastatic disease.  Right juxta cardiac adenopathy concerning for metastatic disease. Hiatal hernia with GERD      CXR with airspace disease RLL; recommend CT. CT chest neg for PNA; chronic right pleural effusion with pleural thickening and adjacent right lower lobe atelectasis, emphysema, chronic interstitial changes, stable left pulm nodules and pleural plaques.  New hepatic metastatic disease.  Right juxta cardiac adenopathy concerning for metastatic disease. Hiatal hernia with GERD. Patient was evaluated by hematology Dr. Dk Machuca, and is elected not to do any treatment. Patient initially did have oxygen dependency at 4 L but has currently been weaned to room air. His sats are currently 98% on room air. Palliative care consult was completed. The patient will go to acute rehab first and then hospice on discharge. Patient was seen and examined today. He is stable for discharge to [address] [place] [address].       Post-traumatic intertrochanteric fracture left hip  - consult Ortho Surg  - covid pre-op screening neg  - EKG: Sinus tachycardia with 1st degree A-V block with Fusion complexes;' Nonspecific ST and T wave  - PT/OT post-op   - social work consulted- to [facility] then home hospice with [facility]  - analgesics PRN  - POD#3      COPD  - cont home meds: trelegy  - O2 PRN; on RA  - CXR : airspace disease RLL; CT recommended by radiology  - WBC 14.8  - CT chest: no pneumonia; chronic right pleural effusion with pleural thickening and adjacent right lower lobe atelectasis, emphysema, chronic interstitial changes, stable left pulmonary nodules and pleural plaques     HTN   - cont home meds     HLD  - cont home meds     Thyroid dysfunction  - cont synthroid     New liver mets  No known primary or previous hx CA  - Consulted GI--Will plan CT A&P for further evaluation for primary.  If negative then EGD/colon if patient wants full evaluation -- GI signed off since no further w/u needed   - pt choosing to forego biopsy since he does not want treatment for CA  - hem/onc to provide supportive care-Dr. Dk Machuca  - Consult to Palliative Care-completed CODE STATUS DNR CCA, will go to the [facility] for acute rehab and will go home hospice      Exam:     /65   Pulse 98   Temp 98.7 °F (37.1 °C) (Oral)   Resp 16   Ht 6' (1.829 m)   Wt 158 lb 4.6 oz (71.8 kg)   SpO2 90%   BMI 21.47 kg/m²     General appearance:   No apparent distress, appears stated age. Cooperative. HEENT:  Normocephalic, atraumatic. PERRLA.  EOMi.  Conjunctivae/corneas clear, no icterus, non-injected. Neck: Supple, with full range of motion. No jugular venous distention. Trachea midline. Respiratory: Currently on room air wheezing cleared from TIERRA in posterior field, Normal respiratory effort. No rales or rhonchi  Cardiovascular:  Regular rate and rhythm without murmurs, rubs or gallops. Abdomen: Soft, non-tender, non-distended, without rebound or guarding. Normal bowel sounds. Musculoskeletal: post op dressing in place , able to wiggle toes,   skin: Skin color, texture, turgor normal.  No rashes or lesions. Neurologic:  Neurovascularly intact without any focal sensory/motor deficits. Cranial nerves: II-XII intact, grossly intact. No facial asymmetry, tongue midline. Psychiatric:  Alert and oriented, thought content appropriate  Capillary Refill: Brisk,< 3 seconds   Peripheral Pulses: +2 palpable, equal bilaterally       Consults:     IP CONSULT TO SOCIAL WORK  IP CONSULT TO GI  IP CONSULT TO PHYSICAL MEDICINE REHAB  IP CONSULT TO ONCOLOGY  IP CONSULT TO PALLIATIVE CARE  IP CONSULT TO HOSPICE    Significant Diagnostic Studies:     CT ABDOMEN PELVIS W IV CONTRAST Additional Contrast? None   Final Result   Multiple hepatic metastasis. Blanche metastasis also seen in the right   pericardial phrenic space. Incompletely healed compression fracture L4. XR HIP LEFT (2-3 VIEWS)   Final Result   Intraprocedural fluoroscopic spot images as above. See separate procedure   report for more information. FLUORO FOR SURGICAL PROCEDURES   Final Result      CT CHEST WO CONTRAST   Final Result   1. No evidence of pneumonia   2. Chronic right pleural effusion with pleural thickening and adjacent right   lower lobe atelectasis   3. Emphysema, chronic interstitial changes, stable left pulmonary nodules and   pleural plaques   4.  Right juxta cardiac adenopathy concerning for metastatic disease   5. New hepatic metastatic disease   6. Hiatal hernia with gastroesophageal reflux         CT Cervical Spine WO Contrast   Final Result   No acute abnormality of the cervical spine. CT Head WO Contrast   Final Result   No acute intracranial abnormality. XR FEMUR LEFT (MIN 2 VIEWS)   Final Result   Acute fracture of the left hip. XR HIP 2-3 VW W PELVIS LEFT   Final Result   Acute fracture of the left hip. XR CHEST PORTABLE   Final Result   Airspace disease in the right lower lung, most compatible with pneumonia,   with a small parapneumonic effusion. There is sharp margination of the right heart border, which is new when   compared to the previous exam, raising the possibility of lobar collapse. Consider further evaluation with chest CT. Disposition:  ARU, then home with hospice of [place]    Condition at Discharge: Stable    Discharge Instructions/Follow-up: Follow-up with Dr. De Blake in 2 weeks    Code Status:  DNR-CCA     Activity: activity as tolerated    Diet: regular diet      Labs: For convenience and continuity at follow-up the following most recent labs are provided:      CBC:    Lab Results   Component Value Date    WBC 9.4 10/04/2020    HGB 9.6 10/04/2020    HCT 27.9 10/04/2020     10/04/2020       Renal:    Lab Results   Component Value Date     10/04/2020    K 4.1 10/04/2020    K 4.9 09/29/2020     10/04/2020    CO2 26 10/04/2020    BUN 24 10/04/2020    CREATININE 0.9 10/04/2020    CALCIUM 8.5 10/04/2020       Discharge Medications:     Current Discharge Medication List           Details   HYDROcodone-acetaminophen (NORCO) 5-325 MG per tablet Take 1 tablet by mouth every 6 hours as needed for Pain for up to 5 days.   Qty: 20 tablet, Refills: 0    Comments: Reduce doses taken as pain becomes manageable  Associated Diagnoses: Closed nondisplaced intertrochanteric fracture of left femur, initial encounter ([facility] [address] Hepatic metastases (HCC)      aspirin EC 81 MG EC tablet Take 1 tablet by mouth 2 times daily Take for DVT blood clot prophylaxis. Please avoid missing doses. Qty: 60 tablet, Refills: 0              Details   Multiple Vitamins-Minerals (PRESERVISION AREDS 2 PO) Take by mouth 2 times daily      omeprazole (PRILOSEC) 40 MG delayed release capsule Take 1 capsule by mouth every morning (before breakfast)  Qty: 30 capsule, Refills: 5      tamsulosin (FLOMAX) 0.4 MG capsule TAKE ONE CAPSULE BY MOUTH DAILY  Qty: 90 capsule, Refills: 4      levothyroxine (SYNTHROID) 50 MCG tablet TAKE ONE TABLET BY MOUTH DAILY  Qty: 90 tablet, Refills: 3      finasteride (PROSCAR) 5 MG tablet TAKE ONE TABLET BY MOUTH DAILY  Qty: 90 tablet, Refills: 3      Cholecalciferol (VITAMIN D) 2000 UNITS CAPS capsule Take by mouth daily      fluticasone-umeclidin-vilant (TRELEGY ELLIPTA) 100-62.5-25 MCG/INH AEPB Inhale 1 puff into the lungs daily             No future appointments. Time Spent on discharge is more than 45 minutes in the examination, evaluation, counseling and review of medications and discharge plan. Signed:    Meenu Gonzales, RANDA - CNP   10/4/2020      Thank you Margarita Madrid MD for the opportunity to be involved in this patient's care. If you have any questions or concerns please feel free to contact me at [phone].

## 2020-10-05 ENCOUNTER — TELEPHONE (OUTPATIENT)
Dept: FAMILY MEDICINE CLINIC | Age: 85
End: 2020-10-05

## 2020-10-05 LAB
ANION GAP SERPL CALCULATED.3IONS-SCNC: 10 MMOL/L (ref 3–16)
BUN BLDV-MCNC: 29 MG/DL (ref 7–20)
CALCIUM SERPL-MCNC: 8.9 MG/DL (ref 8.3–10.6)
CHLORIDE BLD-SCNC: 105 MMOL/L (ref 99–110)
CO2: 27 MMOL/L (ref 21–32)
CREAT SERPL-MCNC: 1 MG/DL (ref 0.8–1.3)
GFR AFRICAN AMERICAN: >60
GFR NON-AFRICAN AMERICAN: >60
GLUCOSE BLD-MCNC: 134 MG/DL (ref 70–99)
HCT VFR BLD CALC: 27.5 % (ref 40.5–52.5)
HEMOGLOBIN: 9.3 G/DL (ref 13.5–17.5)
MCH RBC QN AUTO: 31.8 PG (ref 26–34)
MCHC RBC AUTO-ENTMCNC: 33.9 G/DL (ref 31–36)
MCV RBC AUTO: 93.8 FL (ref 80–100)
PDW BLD-RTO: 13.4 % (ref 12.4–15.4)
PLATELET # BLD: 245 K/UL (ref 135–450)
PMV BLD AUTO: 8.2 FL (ref 5–10.5)
POTASSIUM SERPL-SCNC: 4 MMOL/L (ref 3.5–5.1)
RBC # BLD: 2.94 M/UL (ref 4.2–5.9)
SODIUM BLD-SCNC: 142 MMOL/L (ref 136–145)
WBC # BLD: 9.1 K/UL (ref 4–11)

## 2020-10-05 PROCEDURE — 97110 THERAPEUTIC EXERCISES: CPT | Performed by: PHYSICAL THERAPIST

## 2020-10-05 PROCEDURE — 97542 WHEELCHAIR MNGMENT TRAINING: CPT | Performed by: PHYSICAL THERAPIST

## 2020-10-05 PROCEDURE — 36415 COLL VENOUS BLD VENIPUNCTURE: CPT

## 2020-10-05 PROCEDURE — 94640 AIRWAY INHALATION TREATMENT: CPT

## 2020-10-05 PROCEDURE — 97535 SELF CARE MNGMENT TRAINING: CPT

## 2020-10-05 PROCEDURE — 97530 THERAPEUTIC ACTIVITIES: CPT

## 2020-10-05 PROCEDURE — 85027 COMPLETE CBC AUTOMATED: CPT

## 2020-10-05 PROCEDURE — 80048 BASIC METABOLIC PNL TOTAL CA: CPT

## 2020-10-05 PROCEDURE — 1280000000 HC REHAB R&B

## 2020-10-05 PROCEDURE — 94760 N-INVAS EAR/PLS OXIMETRY 1: CPT

## 2020-10-05 PROCEDURE — 2580000003 HC RX 258: Performed by: PHYSICAL MEDICINE & REHABILITATION

## 2020-10-05 PROCEDURE — 97116 GAIT TRAINING THERAPY: CPT | Performed by: PHYSICAL THERAPIST

## 2020-10-05 PROCEDURE — 97167 OT EVAL HIGH COMPLEX 60 MIN: CPT

## 2020-10-05 PROCEDURE — 6370000000 HC RX 637 (ALT 250 FOR IP): Performed by: PHYSICAL MEDICINE & REHABILITATION

## 2020-10-05 PROCEDURE — 97530 THERAPEUTIC ACTIVITIES: CPT | Performed by: PHYSICAL THERAPIST

## 2020-10-05 PROCEDURE — 6360000002 HC RX W HCPCS: Performed by: PHYSICAL MEDICINE & REHABILITATION

## 2020-10-05 PROCEDURE — 97162 PT EVAL MOD COMPLEX 30 MIN: CPT | Performed by: PHYSICAL THERAPIST

## 2020-10-05 RX ADMIN — BUDESONIDE AND FORMOTEROL FUMARATE DIHYDRATE 2 PUFF: 80; 4.5 AEROSOL RESPIRATORY (INHALATION) at 20:05

## 2020-10-05 RX ADMIN — DOCUSATE SODIUM 50 MG AND SENNOSIDES 8.6 MG 2 TABLET: 8.6; 5 TABLET, FILM COATED ORAL at 08:13

## 2020-10-05 RX ADMIN — ENOXAPARIN SODIUM 40 MG: 40 INJECTION SUBCUTANEOUS at 08:12

## 2020-10-05 RX ADMIN — PANTOPRAZOLE SODIUM 40 MG: 40 TABLET, DELAYED RELEASE ORAL at 06:17

## 2020-10-05 RX ADMIN — MAGNESIUM HYDROXIDE 30 ML: 400 SUSPENSION ORAL at 08:13

## 2020-10-05 RX ADMIN — LEVOTHYROXINE SODIUM 50 MCG: 0.05 TABLET ORAL at 06:17

## 2020-10-05 RX ADMIN — Medication 10 ML: at 08:12

## 2020-10-05 RX ADMIN — BUDESONIDE AND FORMOTEROL FUMARATE DIHYDRATE 2 PUFF: 80; 4.5 AEROSOL RESPIRATORY (INHALATION) at 08:28

## 2020-10-05 RX ADMIN — TAMSULOSIN HYDROCHLORIDE 0.4 MG: 0.4 CAPSULE ORAL at 08:12

## 2020-10-05 RX ADMIN — TIOTROPIUM BROMIDE INHALATION SPRAY 2 PUFF: 3.12 SPRAY, METERED RESPIRATORY (INHALATION) at 08:28

## 2020-10-05 NOTE — PROGRESS NOTES
Occupational Therapy   Occupational Therapy Initial Assessment  Date: 10/5/2020   Patient Name: Mimi Torres  MRN: [id number]     : 1927    Date of Service: 10/5/2020    Discharge Recommendations:  Home with Home health OT, Continue to assess pending progress, 24 hour supervision or assist  OT Equipment Recommendations  Other: TBD    Assessment   Performance deficits / Impairments: Decreased endurance;Decreased strength;Decreased functional mobility ; Decreased ADL status; Decreased balance  Assessment: Pt is a 79 yo male admitted after a fall resulting in femur fracture, determined to be pathological with new diagnoses of hepatic metastatic disease. He was functioning indep PTA, admits he had poor appetite and was loosing weight - drinks boost at home. He lives with his wife - pt states he is his wife's caregiver due to cognitive issues, but she amb independently. Pt is currently functioning well below his baseline in above areas - requires mod assist for bathing, min UB dressing, dependent for LB dressing. Transfers with pivot style using grab bar or arm of wheelchair with mod assist,monitoring weight bearing to 25% PWB LLE. He has difficulty with sit to stand and maintaining PWB during turn to sit. Anticipate he will require a wheelchair for mobility at home and assist of family members. DME to be determined. Anticipate pt will require 2-3 weeks inpt rehab with skilled OT services to maximize independence for safe return home with family assist and planned hospice care. Treatment Diagnosis: decreased ADL, IADL, balance, activity tolerance  Prognosis: Fair  Decision Making: High Complexity  History: A 80year old  male who presents to the hospital, status post mechanical fall when he was turning around,trying to grab on to something and lost his balance followed by excruciating pain in the left hip and inability to get up from theground without any nausea, vomiting, fevers, or chills. Lower Extremity Weight Bearing: Partial Weight Bearing  Partial Weight Bearing Percentage Or Pounds: 25% WBing  Position Activity Restriction  Other position/activity restrictions: 25% PWB left LE, IV left anticubital , new diagnosis hepatic metastatic disease    Subjective   General  Chart Reviewed: Yes, Orders, Progress Notes, History and Physical, Previous Admission  Additional Pertinent Hx: A 80year old  male who presents to the hospital, status post mechanical fall when he was turning around,trying to grab on to something and lost his balance followed by excruciating pain in the left hip and inability to get up from theground without any nausea, vomiting, fevers, or chills. Patient had ORIF on 9/30. In the emergency room he did have a CT which showed chronic right pleural effusion with pleural thickening and adjacent right lower lobe atelectasis, emphysema, chronic interstitial changes, stable left pulm nodules and pleural plaques. New hepatic metastatic disease. Right juxta cardiac adenopathy concerning for metastatic disease.  Hiatal hernia with GERD(copied per note Katrina Chavez, RANDA, CNP 10/4/2020)  Family / Caregiver Present: No  Referring Practitioner: aYs Caban  Diagnosis: Closed pertrochanteric fracture of L femur  Subjective  Subjective: Pt met bedside, agreeable to OT, no complaint of pain     Social/Functional History  Social/Functional History  Lives With: Spouse(states wife has difficulty with her memory, he is her caregiver, but other family members are assisting at this time)  Type of Home: House  Home Layout: One level  Home Access: Stairs to enter with rails  Entrance Stairs - Number of Steps: 2 FINN  Entrance Stairs - Rails: Left  Bathroom Shower/Tub: Walk-in shower, Shower chair with back  Bathroom Toilet: Standard  Bathroom Equipment: Grab bars in shower, Grab bars around toilet  Bathroom Accessibility: Walker accessible  Home Equipment: Wheelchair-manual, Standard walker, of Movement Other  Comment: min tremors in hand, some difficulty with grasp due to numbness in hands     Bed mobility  Supine to Sit: Moderate assistance(bed flat, cues to use railing, L LE supported)        Cognition  Overall Cognitive Status: Exceptions  Following Commands: Follows one step commands with increased time  Attention Span: Difficulty dividing attention  Memory: Decreased recall of precautions  Problem Solving: Assistance required to generate solutions;Assistance required to implement solutions;Assistance required to identify errors made  Insights: Decreased awareness of deficits  Initiation: Requires cues for some  Sequencing: Requires cues for some  Cognition Comment: pt very talkative, somewhat tangential in conversation. Generally followed directions with increased time        Sensation  Overall Sensation Status: Impaired(complained of numbness in all fingers - stated this has been for approx 6 months)        LUE AROM (degrees)  LUE AROM : WFL  Left Hand AROM (degrees)  Left Hand AROM: WFL  LUE Strength  Gross LUE Strength: WFL  RUE Strength  Gross RUE Strength: WFL                   Plan   Plan  Times per week: 5-6 days per week  Times per day: Twice a day  Plan weeks: 2-3 weeks  Current Treatment Recommendations: Endurance Training, Strengthening, Balance Training, Functional Mobility Training, Safety Education & Training, Equipment Evaluation, Education, & procurement, Patient/Caregiver Education & Training, Self-Care / ADL, Wheelchair Mobility Training                 Goals  Short term goals  Time Frame for Short term goals: 1 week pt will. Marcus King term goal 1: bathe with min assist and AD  Short term goal 2: dress UB after set up, LB with mod assist and AD  Short term goal 3: transfer with min assist and AD, maintainting 25% PWB LLE  Short term goal 4: functional mobility using wheelchair with min assist  Short term goal 5: increase activity tolerance to stand 2 min for ADL  Short term goal 6:  toilet with max assist and AD  Long term goals  Time Frame for Long term goals : 2-3 weeks  Long term goal 1: bathe with supervision/set up  Long term goal 2: dress UB indep, LB with min assist  Long term goal 3: toilet with CGA and AD  Long term goal 4: transfer with CGA/SBA using appropriate AD  Long term goal 5: functional mobility in wheelchair with supervision  Patient Goals   Patient goals : \"I want to be able to get home\"  \"I need to be able to dress myself\"       Therapy Time   Individual Concurrent Group Co-treatment   Time In 0945         Time Out 1115         Minutes 90         Timed Code Treatment Minutes: 84 Ana [address]den, OTr/L 770

## 2020-10-05 NOTE — TELEPHONE ENCOUNTER
Spoke with daughter in law at length. Increase boost to tid as it is all he tolerates. See me in fu when discharged. Unfortunately we knew cancer was a possibility , patient did not want to work up his symptoms previously.  We will continue to do everything we can to honor his wishes and Preferences

## 2020-10-05 NOTE — H&P
Department of Physical Medicine & Rehabilitation  History & Physical      Patient Identification:  Robby Jiménez  : 1927  Admit date: 10/4/2020   Attending provider: Cem Tsai MD        Primary care provider: Tyler Panda MD        Chief Complaint: left hip pain     History of Present Illness/Hospital Course:  Patient is a 81 yo M with pmh HTN, HLD, COPD, chronic R pleural effusion, and BPH who initially presented 2020 with left hip pain s/p mechanical fall. Patient reported losing his balance while turning. Found to have left intertrochanteric femur fracture and left femoral neck fracture. Underwent ORIF with cephalomedullary nail ( with Dr. Anita Hung). Now 25% WB on LLE. Course complicated by incidental finding of hepatic lesions, concerning for metastatic disease. Oncology and GI were consulted. Patient decided not to have biopsy done as he does not plan to pursue any treatment. Course further complicate by acute hypoxic respiratory failure and SHREE. Patient now presents to ARU with impaired mobility and self-care below his baseline. Currently, patient reports mild left hip pain (overall improved since surgery). Worse with movement and partial weight bearing. Improves with rest. He denies any focal weakness or tingling/numbness. He is fatigued and frustrated with his current impairments.  He is motivated to start inpatient rehab program.        Prior Level of Function:  Independent for mobility, ADLs, and IADLs     Current Level of Function:  Min-modA x 2 person for mobility  Mod-maxA for ADLs     Pertinent Social History:  Support: Lives with spouse  Home set-up: 1 level with 2 steps to enter    Past Medical History:   Diagnosis Date    Arthritis     BPH with obstruction/lower urinary tract symptoms     Dr. Paula Jo    Diverticular disease     Diverticulosis of sigmoid colon with h/o bleeding    Ectropion of right eye     Emphysema of lung ([facility] [address]     PLASCENCIA    Hypercholesteremia     Macular degeneration     Dr. Vincenzo Peña Moderate mitral regurgitation 2017    with mild Aortic Regurge and diastolic dysfunction    PONV (postoperative nausea and vomiting)     Squamous cell skin cancer     Nose and face    Vitamin D deficiency     Wears glasses        Past Surgical History:   Procedure Laterality Date    CATARACT REMOVAL WITH IMPLANT Bilateral     COLONOSCOPY  2016    Meghana Morrell       Family History   Problem Relation Age of Onset   Jaeger Other Mother         old age  80   Jaeger Other Father         old age   Jaeger Cancer Sister    [address] Sclerosis Daughter        Social History     Socioeconomic History    Marital status:      Spouse name: Kassie Marie ([id number])    Number of children: 6    Years of education: Not on file    Highest education level: Not on file   Occupational History    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/   Social Needs    Financial resource strain: Not on file    Food insecurity     Worry: Not on file     Inability: Not on file   [employer] needs     Medical: Not on file     Non-medical: Not on file   Tobacco Use    Smoking status: Former Smoker    Smokeless tobacco: Never Used    Tobacco comment: quit 50 years ago   Substance and Sexual Activity    Alcohol use:  Yes     Alcohol/week: 0.0 standard drinks     Comment: infrequently    Drug use: No    Sexual activity: Never   Lifestyle    Physical activity     Days per week: Not on file     Minutes per session: Not on file    Stress: Not on file   Relationships    Social connections     Talks on phone: Not on file     Gets together: Not on file     Attends Congregation service: Not on file     Active member of club or organization: Not on file     Attends meetings of clubs or organizations: Not on file     Relationship status: Not on file    Intimate partner violence     Fear of current or ex partner: Not on file     Emotionally abused: Not on file Physically abused: Not on file     Forced sexual activity: Not on file   Other Topics Concern    Not on file   Social History Narrative    Not on file       Allergies   Allergen Reactions    Erythromycin Base Other (See Comments)     Unable to recall reaction  Unable to recall reaction    Fentanyl     Penicillins Other (See Comments)     Passed out  Passed out  Passed out      Versed [Midazolam] Nausea And Vomiting         Current Facility-Administered Medications   Medication Dose Route Frequency Provider Last Rate Last Dose    enoxaparin (LOVENOX) injection 40 mg  40 mg Subcutaneous Daily Sandeep Vanegas MD   40 mg at 10/05/20 1581    sodium chloride flush 0.9 % injection 10 mL  10 mL Intravenous 2 times per day Sandeep Vanegas MD   10 mL at 10/05/20 3224    sodium chloride flush 0.9 % injection 10 mL  10 mL Intravenous PRN Sandeep Vanegas MD        acetaminophen (TYLENOL) tablet 650 mg  650 mg Oral Q4H PRN Sandeep Vanegas MD        albuterol sulfate  (90 Base) MCG/ACT inhaler 2 puff  2 puff Inhalation Q6H PRN Sandeep Vanegas MD        budesonide-formoterol (SYMBICORT) 80-4.5 MCG/ACT inhaler 2 puff  2 puff Inhalation BID Sandeep Vanegas MD   2 puff at 10/05/20 0828    diphenhydrAMINE (BENADRYL) tablet 25 mg  25 mg Oral Q6H PRN Sandeep Vanegas MD        hydrALAZINE (APRESOLINE) tablet 50 mg  50 mg Oral Q8H PRN Sandeep Vanegas MD        HYDROcodone-acetaminophen (NORCO) 5-325 MG per tablet 1 tablet  1 tablet Oral Q6H PRN Sandeep Vanegas MD        ipratropium-albuterol (DUONEB) nebulizer solution 1 ampule  1 ampule Inhalation Q4H PRN Sandeep Vanegas MD        levothyroxine (SYNTHROID) tablet 50 mcg  50 mcg Oral Daily Sandeep Vanegas MD   50 mcg at 10/05/20 0617    ondansetron (ZOFRAN-ODT) disintegrating tablet 4 mg  4 mg Oral Q8H PRN Sandeep Vanegas MD        pantoprazole (PROTONIX) tablet 40 mg  40 mg Oral QAM AC Sandeep Vanegas MD   40 mg at 10/05/20 0617    tamsulosin [facility]) capsule 0.4 mg  0.4 mg Oral Daily Shanna Cardona MD   0.4 mg at 10/05/20 2839    tiotropium (SPIRIVA RESPIMAT) 2.5 MCG/ACT inhaler 2 puff  2 puff Inhalation Daily Shanna Cardona MD   2 puff at 10/05/20 7913    traZODone (DESYREL) tablet 50 mg  50 mg Oral Nightly PRN Sahnna Cardona MD        sennosides-docusate sodium (SENOKOT-S) 8.6-50 MG tablet 2 tablet  2 tablet Oral Daily Shanna Cardona MD   2 tablet at 10/05/20 0813    magnesium hydroxide (MILK OF MAGNESIA) 400 MG/5ML suspension 30 mL  30 mL Oral Daily PRN Shanna Cardona MD   30 mL at 10/05/20 0813         REVIEW OF SYSTEMS:   CONSTITUTIONAL: negative for fevers, chills, diaphoresis, appetite change, night sweats, unexpected weight change, fatigue  EYES: negative for blurred vision, eye discharge, visual disturbance and icterus  HEENT: negative for hearing loss, tinnitus, ear drainage, sinus pressure, nasal congestion, epistaxis and snoring  RESPIRATORY: Negative for hemoptysis, cough, sputum production  CARDIOVASCULAR: negative for chest pain, palpitations, exertional chest pressure/discomfort, syncope, edema  GASTROINTESTINAL: negative for nausea, vomiting, diarrhea, blood in stool, abdominal pain, constipation  GENITOURINARY: negative for frequency, dysuria, urinary incontinence, decreased urine volume, and hematuria  HEMATOLOGIC/LYMPHATIC: negative for easy bruising, bleeding and lymphadenopathy  ALLERGIC/IMMUNOLOGIC: negative for recurrent infections, angioedema, anaphylaxis and drug reactions  ENDOCRINE: negative for weight changes and diabetic symptoms including polyuria, polydipsia and polyphagia  MUSCULOSKELETAL: refer to HPI  NEUROLOGICAL: negative for headaches, slurred speech, unilateral weakness  PSYCHIATRIC/BEHAVIORAL: negative for hallucinations, behavioral problems, confusion and agitation.        All pertinent positives are noted in the HPI.     Physical Examination:  Vitals:   Patient Vitals for the past 24 hrs:   BP Temp Temp src Pulse Resp SpO2 Height Weight   10/05/20 0828 -- -- -- -- 16 92 % -- --   10/05/20 0800 123/67 98 °F (36.7 °C) Oral 98 16 94 % -- --   10/05/20 0503 (!) 144/75 97.2 °F (36.2 °C) Oral 106 16 90 % -- 157 lb 10.1 oz (71.5 kg)   10/04/20 1959 -- -- -- -- 16 94 % -- --   10/04/20 1940 127/73 98.3 °F (36.8 °C) Oral 103 18 92 % -- --   10/04/20 1406 124/70 97.8 °F (36.6 °C) Oral 105 19 90 % -- --       Const: Alert. WDWN. No distress  Eyes: Conjunctiva noninjected, no icterus noted; pupils equal, round, and reactive to light. HENT: Atraumatic, normocephalic; Oral mucosa moist  Neck: Trachea midline, neck supple. No thyromegaly noted. CV: Regular rate and rhythm, no murmur rub or gallop noted  Resp: Lungs decreased at bases (R>L). No rales wheezes or rhonchi, no retractions. Respirations unlabored. GI: Soft, nontender, nondistended. Normal bowel sounds. No palpable masses. Skin: Normal temperature and turgor. No rashes or breakdown noted. Ext: No significant edema appreciated. No varicosities. MSK: Decreased left hip ROM. No joint tenderness, erythema, warmth noted. Neuro: Alert, oriented, appropriate. No cranial nerve deficits appreciated. Sensation intact to light touch. Motor examination reveals strength overall 5-/5 except 4/5 right hip flexion and 2-3/5 left hip flexion (limited by pain), did not fully test distal LLE due to WB restrictions. No abnormalities with finger/nose noted. Reflexes diminished, symmetric.   Psych: Stable mood, normal judgement, normal affect         Lab Results   Component Value Date    WBC 9.1 10/05/2020    HGB 9.3 (L) 10/05/2020    HCT 27.5 (L) 10/05/2020    MCV 93.8 10/05/2020     10/05/2020     Lab Results   Component Value Date    INR 1.12 09/29/2020    PROTIME 13.0 09/29/2020     Lab Results   Component Value Date    CREATININE 1.0 10/05/2020    BUN 29 (H) 10/05/2020     10/05/2020    K 4.0 10/05/2020     10/05/2020    CO2 27 10/05/2020     Lab no acute intracranial hemorrhage, mass effect or    midline shift.  No abnormal extra-axial fluid collection.  The gray-white    differentiation is maintained without evidence of an acute infarct. Hypoattenuation of the periventricular and subcortical white matter is    suggestive of chronic small vessel ischemic disease.  Mild diffuse    parenchymal volume loss is noted.  Vascular calcification is seen. Renzo Honour is    no evidence of hydrocephalus.         ORBITS: The visualized portion of the orbits demonstrate no acute abnormality.         SINUSES: The visualized paranasal sinuses and mastoid air cells demonstrate    no acute abnormality.         SOFT TISSUES/SKULL:  No acute abnormality of the visualized skull or soft    tissues.          Xray left hip  Pelvis with left hip: There is a comminuted, intertrochanteric fracture of    the left hip with valgus angulation.  No hip dislocation is identified.         Left femur: No fracture of the distal femur is visualized          On my review, CXR displays RLL airspace disease and effusion. The above laboratory data have been reviewed. The above imaging data have been reviewed. The above medical testing have been reviewed. Body mass index is 21.38 kg/m². POST ADMISSION PHYSICIAN EVALUATION  The patient has agreed to being admitted to our comprehensive inpatient rehabilitation facility and can tolerate the intensity of service consisting of at least:  --180 minutes of therapy a day, 5 out of 7 days a week. OR  --15 hours of intensive therapy within a 7 consecutive day period.      The patient/family has a good understanding of our discharge process and will benefit from an interdisciplinary inpatient rehabilitation program. The patient has potential to make improvement and is in need of at least two of the following multidisciplinary therapies including but not limited to physical, occupational, respiratory, and speech, nutritional services, wound care, and prosthetics and orthotics. Given the patients complex condition and risk of further medical complications, rehabilitation services cannot be safely provided at a lower level of care such as a skilled nursing facility. All of the goals listed below were reviewed with the patient and he/she is in agreement. I have compared the patients medical and functional status at the time of the preadmission screening and the same on this date, and there are no significant changes. By signing this document, I acknowledge that I have personally performed a full physical examination on this patient within 24 hours of admission to this inpatient rehabilitation facility and have determined the patient to be able to tolerate the above course of treatment at an intensive level for a reasonable period of time. I will be completing a detailed individualized  Plan of Care for this patient by day four of the patients stay based upon the Preadmission Screen, this Post-Admission Evaluation, and the therapy evaluations.      Barriers: 25% WB LLE, decreased balance, endurance, safety awareness, medical comorbidities  Services Required: PT, OT  Goals: Jaiden-supervision for mobility, Jaiden-Chela for ADLs  Prognosis: Good  Anticipated Dispo: home with wife, will need additional assist from family/services  ELOS: 2-3 weeks    Rehabilitation Diagnosis:   Orthopedic, 8.11, Unilateral Hip Fracture      Assessment and Plan:    Impairments: 25% WB LLE, decreased balance, endurance, safety awareness    Left intertrochanteric femur fracture, femoral neck fracture   -s/p ORIF with cephalomedullary nail (9/30 with Dr. Sasha Calderon)   -Wound care  -25% WB LLE  -PT/OT    Hepatic lesions, right juxta cardiac adenopathy  -Presumed metastatic disease, unknown primary  -No further work-up per patient wishes, does not plan to pursue treatment  -Palliative care consulted and will follow at home for long-term needs    Acute blood loss anemia   -Post-surgical. -Monitor Hgb, transfuse prn <7. Acute hypoxic respiratory failure  -Supplemental O2, wean as tolerated  -IS  -Breathing treatments for COPD as below    Chronic right pleural effusion  -Follows with Pulm as outpatient    COPD  -Symbicort, Spiriva, prn duoneb    HTN-->post-op hypotension    SHREE  -Improved with IVF  -Avoid nephrotoxins, renally dose meds  -Monitor     Hypothyroidism  -Levothyroxine    Bladder   -High risk retention   -Monitor PVRs, SC prn >300cc  -Flomax    Bowel   -High risk constipation   -senna+colace BID, PRN miralax, MoM, and bisacodyl supp. Pain control  -prn Norco    PPx  -DVT: lovenox while inpatient, then ASA 81 BID x 30 days per Ortho  -GI: pantoprazole    CODE STATUS - DNR-CCA    Omari Cohn MD 10/5/2020, 10:42 AM

## 2020-10-05 NOTE — PLAN OF CARE
Problem: Falls - Risk of:  Goal: Will remain free from falls  Description: Will remain free from falls  10/5/2020 0307 by Caroleen Halsted, RN  Outcome: Ongoing  Note: Fall risk band on patient. Orange light on outside of room. Non skid footwear in place. Alarms used appropriately. Patient instructed to call and wait for staff before getting up. Rounding done to anticipate needs. Appropriate safety devices used for transfers.

## 2020-10-05 NOTE — PLAN OF CARE
Problem: Falls - Risk of:  Goal: Will remain free from falls  Description: Will remain free from falls  10/5/2020 6962 by Pascale Gonzales RN  Note: Fall risk band on patient. Orange light on outside of room. Non skid footwear in place. Alarms used appropriately. Patient instructed to call and wait for staff before getting up. Rounding done to anticipate needs. Appropriate safety devices used for transfers. 10/5/2020 0307 by Lukasz Barahona RN  Outcome: Ongoing  Note: Fall risk band on patient. Orange light on outside of room. Non skid footwear in place. Alarms used appropriately. Patient instructed to call and wait for staff before getting up. Rounding done to anticipate needs. Appropriate safety devices used for transfers. Goal: Absence of physical injury  Description: Absence of physical injury  10/5/2020 0307 by Lukasz Barahona RN  Outcome: Ongoing     Problem: Skin Integrity:  Goal: Will show no infection signs and symptoms  Description: Will show no infection signs and symptoms  10/5/2020 0922 by Pascale Gonzales RN  Outcome: Ongoing  10/5/2020 0307 by Lukasz Barahona RN  Outcome: Ongoing  Goal: Absence of new skin breakdown  Description: Absence of new skin breakdown  10/5/2020 0922 by Pascale Gonzales RN  Note: Skin assessment completed on admission and every shift. Barrier wipes used in the event of incontinence. Pressure relief techniques used as needed while in chair and in bed. Position changes encouraged at least every two hours while in bed.     10/5/2020 0307 by Lukasz Barahona RN  Outcome: Ongoing     Problem: Daily Care:  Goal: Daily care needs are met  Description: Daily care needs are met  10/5/2020 0307 by Lukasz Barahona RN  Outcome: Ongoing     Problem: Pain:  Goal: Patient's pain/discomfort is manageable  Description: Patient's pain/discomfort is manageable  10/5/2020 0307 by Lukasz Barahona RN  Outcome: Ongoing     Problem: Discharge Planning:  Goal: Patients continuum of care needs are met  Description: Patients continuum of care needs are met  10/5/2020 0307 by Jakob Epps RN  Outcome: Ongoing     Problem: Activity:  Goal: Ability to ambulate will improve  Description: Ability to ambulate will improve  10/5/2020 0922 by Percy Broderick RN  Outcome: Ongoing  Note: Patient working with therapy at least 3 hours/day to obtain maximal mobility. Safety devices used.     10/5/2020 0307 by Jakob Epps RN  Outcome: Ongoing  Goal: Ability to perform activities at highest level will improve  Description: Ability to perform activities at highest level will improve  10/5/2020 0307 by Jakob Epps RN  Outcome: Ongoing     Problem: Health Behavior:  Goal: Identification of resources available to assist in meeting health care needs will improve  Description: Identification of resources available to assist in meeting health care needs will improve  10/5/2020 0307 by Jakob Epps RN  Outcome: Ongoing     Problem: Nutritional:  Goal: Ability to attain and maintain optimal nutritional status will improve  Description: Ability to attain and maintain optimal nutritional status will improve  10/5/2020 0922 by Percy Broderick RN  Outcome: Ongoing  10/5/2020 0307 by Jakob Epps RN  Outcome: Ongoing     Problem: Physical Regulation:  Goal: Will remain free from infection  Description: Will remain free from infection  10/5/2020 0307 by Jakob Epps RN  Outcome: Ongoing  Goal: Postoperative complications will be avoided or minimized  Description: Postoperative complications will be avoided or minimized  10/5/2020 0307 by Jakob Epps RN  Outcome: Ongoing  Goal: Diagnostic test results will improve  Description: Diagnostic test results will improve  10/5/2020 0307 by Jakob Epps RN  Outcome: Ongoing     Problem: Self-Care:  Goal: Ability to meet self-care needs will improve  Description: Ability to meet self-care needs will improve  10/5/2020 0922 by Percy Broderick RN  Outcome: Ongoing  10/5/2020 0307 by Jakob Epps RN  Outcome: Ongoing

## 2020-10-05 NOTE — TELEPHONE ENCOUNTER
PT was taken to [facility] for a Broken leg. PT then found out that he has cancer. PT is having a very low appetite and PT's daughter is concerned wanting to know if she could speak to PCP regarding her fathers ongoing issues.          Best call back for PT's daughter Joseph: [phone]

## 2020-10-05 NOTE — PLAN OF CARE
Problem: Nutrition  Goal: Optimal nutrition therapy  Outcome: Ongoing   Nutrition Problem #1: Inadequate oral intake  Intervention: Food and/or Nutrient Delivery: Continue Current Diet, Continue Oral Nutrition Supplement, Start Oral Nutrition Supplement  Nutritional Goals: consume >50% of meals and supplements during admission

## 2020-10-05 NOTE — CONSULTS
Comprehensive Nutrition Assessment    Type and Reason for Visit:  Consult, Positive Nutrition Screen(nutritional assessment for Jethro score/ MST2)    Nutrition Recommendations/Plan:   Continue General diet. Continue Ensure. Trial Magic Cup BID    Nutrition Assessment:  Pt presented to ARU s/p ORIF after fall causing hip fx. Pt found to have hepatic lesions suggestive of metastatic disease on CT. Pt was on acute floor with varied intakes ~50% overall. Pt tolerated breakfast poorly this AM. Supplement on board but no intakes at this time. Wt fairly stable +/-5#. Will trial additional supplements to find one pt favors. Malnutrition Assessment:  Malnutrition Status: At risk for malnutrition     Context:  Acute Illness     Findings of the 6 clinical characteristics of malnutrition:  Energy Intake:  7 - 50% or less of estimated energy requirements for 5 or more days  Weight Loss:  No significant weight loss     Body Fat Loss:  No significant body fat loss     Muscle Mass Loss:  No significant muscle mass loss    Fluid Accumulation:  No significant fluid accumulation     Strength:  Not Performed    Estimated Daily Nutrient Needs:  Energy (kcal):  6507-4705 kcal (25-30kcal/kg CBW)  Protein (g):  71-92 gm (1-1.3gm/kg CBW)     Fluid (ml/day):  1 mL/kcal     Nutrition Related Findings:  BM 9/29; +1 LLE edema      Wounds:  None       Current Nutrition Therapies:    DIET GENERAL;  Dietary Nutrition Supplements: Standard High Calorie Oral Supplement    Anthropometric Measures:  · Height: 6' (182.9 cm)  · Current Body Weight: 157 lb (71.2 kg)   · Admission Body Weight: 157 lb (71.2 kg)    · Usual Body Weight: 160 lb (72.6 kg)     · Ideal Body Weight: 178 lbs; % Ideal Body Weight 88.2 %   · BMI: 21.3  · BMI Categories: Normal Weight (BMI 18.5-24. 9)       Nutrition Diagnosis:   · Inadequate oral intake related to psychological cause or life stress as evidenced by poor intake prior to admission      Nutrition

## 2020-10-05 NOTE — PROGRESS NOTES
Physical Therapy    Facility/Department: [address] IP REHAB  Initial Assessment and PM session    NAME: Pietro Perera  : 1927  MRN: [id number]    Date of Service: 10/5/2020    Discharge Recommendations:  Continue to assess pending progress, 5-7 sessions per week   PT Equipment Recommendations  Other: cont to assess. Functioning from [facility] level will likely make patient the most independent in functional mobility until PWBng restriction is lifted. Assessment   Body structures, Functions, Activity limitations: Decreased functional mobility ; Decreased ADL status; Decreased balance  Assessment: Pt is 80 y.o. M who presents s/p mechanical fall in kitchen resulting in closed pertrochanteric fracture of L femur. Pt is s/p L hip nailing and is 25% WBing.  pmh HTN, HLD, COPD, chronic R pleural effusion, and BPH. Patient 's course complicated by hepatic lesions in which patient does not want biopsy, and by acute hypoxic respiratory failure and SHREE. Patientt lives with wife in one story home with 2 FINN. Pt reports he is wife's caregiver due to her forgetfulness. . Pt reports independence in self-care tasks, light homemaking responsibilities, and functional mobility with no device. presently, bed mobility Mod A , Transfers sit<>stand Mod/max A of 1 in parallel bars,  including cues-assist for 25% L LE WBing. Patient took few very small hops with wheeled walker about 6\" with minimal assist of 2 with w/c follow. Patient able to propel w/c 120' with SBA. Patient is motivated to return home but frustrated with lack of progress. Patient limited by WB status, limited endurance, strength and balance. Patient would benefit from inpatient rehab to ease burden of care of family at D/C. Anticipate need for w/c at home.   Treatment Diagnosis: Impaired functional mobility  Prognosis: Good  Decision Making: Medium Complexity  History: as noted  Exam: as above  Clinical Presentation: evolving  PT Education: Goals;PT Role;Plan of Care;Transfer Training;Weight-bearing Education;Gait Training;Functional Mobility Training;General Safety; Adaptive Device Training;Disease Specific Education  Patient Education: rehab expectations, w/c mobility, 25% WB L LE  Barriers to Learning: , frustration about progress  REQUIRES PT FOLLOW UP: Yes  Activity Tolerance  Activity Tolerance: Patient limited by fatigue;Patient limited by endurance  Activity Tolerance: Patient frustrated about lack of progress       Patient Diagnosis(es): There were no encounter diagnoses. has a past medical history of Arthritis, BPH with obstruction/lower urinary tract symptoms, Diverticular disease, Ectropion of right eye, Emphysema of lung ([address] Hypercholesteremia, Macular degeneration, Moderate mitral regurgitation, PONV (postoperative nausea and vomiting), Squamous cell skin cancer, Vitamin D deficiency, and Wears glasses. has a past surgical history that includes Mohs surgery; Cataract removal with implant (Bilateral); and Colonoscopy (06/20/2016). Restrictions  Restrictions/Precautions  Restrictions/Precautions: Fall Risk, Weight Bearing  Lower Extremity Weight Bearing Restrictions  Left Lower Extremity Weight Bearing: Partial Weight Bearing  Partial Weight Bearing Percentage Or Pounds: 25% WBing  Position Activity Restriction  Other position/activity restrictions: 25% PWB left LE, IV left anticubital , new diagnosis hepatic metastatic disease  Vision/Hearing  Vision: Impaired  Vision Exceptions: Wears glasses at all times  Hearing: Within functional limits     Subjective  General  Chart Reviewed: Yes  Additional Pertinent Hx: Pt is 80 y.o. M who presents s/p mechanical fall in kitchen resulting in closed pertrochanteric fracture of L femur. Pt is s/p L hip nailing and is 25% WBing. PMH: Emphysema of lung, skin Ca. Per Oncology consult note: \"During the course of his evaluation he had a scan of the chest without contrast as part of his trauma work-up.   This study dated 9/29/2020 showed interval enlargement of right cardio supra diaphragmatic lymph nodes there were also multiple new hepatic metastatic lesions. A CT scan of the abdomen and pelvis was done today which also confirmed a new enlarged right pericardiophrenic lymph node and an incompletely healed compression fracture at L4.\"  Patient is opting not to pursue investigation/treatment of this issue. Response To Previous Treatment: Patient with no complaints from previous session. Family / Caregiver Present: No  Referring Practitioner: Dr. Tammy Villeda: Patient reports no pain in sitting, agreeable to therapy. Patient expressing frustration about how little he can do with activity.   Pain Screening  Patient Currently in Pain: Denies    Orientation  Orientation  Overall Orientation Status: Within Normal Limits  Social/Functional History  Social/Functional History  Lives With: Spouse(states wife has difficulty with her memory, he is her caregiver, but other family members are assisting at this time)  Type of Home: House  Home Layout: One level  Home Access: Stairs to enter with rails  Entrance Stairs - Number of Steps: 2 FINN  Entrance Stairs - Rails: Left  Bathroom Shower/Tub: Walk-in shower, Shower chair with back  Bathroom Toilet: Standard  Bathroom Equipment: Grab bars in shower, Grab bars around toilet  Bathroom Accessibility: Walker accessible  Home Equipment: [employer], Standard walker, Cane, [address] [place] [address] bed  Receives Help From: (8 Children -  6 are in town)  ADL Assistance: Independent  Homemaking Assistance: (Light meal prep, pt does laundry)  Ambulation Assistance: Independent  Transfer Assistance: Independent  Active : Yes(no night driving)  Occupation: Retired  Type of occupation: [employer]  Additional Comments: Denies other recent falls  Objective     Observation/Palpation  Posture: Fair  Observation: IV L UE    AROM RLE (degrees)  RLE AROM: WFL  AROM LLE (degrees)  LLE AROM : Exceptions  LLE General AROM: Patient 's AAROM to 60 degrees hip flexion when supine, AAROM hip abduction 15 degrees with pain, knee and ankle WFL  Strength RLE  Strength RLE: WFL  Strength LLE  Strength LLE: Exception  L Hip Flexion: 2+/5  L Hip ABduction: 2-/5  L Knee Flexion: 4/5  L Knee Extension: 3+/5  L Ankle Dorsiflexion: 4/5  Motor Control  Gross Motor?: WFL  Sensation  Overall Sensation Status: WFL  Bed mobility  Bridging: Moderate assistance  Rolling to Left: Unable to assess(due to pain)  Rolling to Right: Minimal assistance(with rail)  Supine to Sit: Moderate assistance(L LE assist and slight assist for trunk)  Sit to Supine: Moderate assistance(asssist L LE and slight assist for trunk)  Scooting: Moderate assistance  Transfers  Sit to Stand: Moderate Assistance;Maximum Assistance;2 Person Assistance(in parallel bars mod/ max in parallel bars, moderate assist of 2 with wheeled walker)  Stand to sit: Minimal Assistance; Moderate Assistance  Bed to Chair: Dependent/Total(used Roshni jacobs in PM)  Lateral Transfers: Moderate Assistance;Maximum Assistance(with sliding board to even surface)  Comment: room air, O2 sats 93% and  bpm after stand  Ambulation  Ambulation?: Yes  More Ambulation?: Yes  Ambulation 1  Surface: level tile  Device: Parallel Bars  Other Apparatus: Wheelchair follow  Quality of Gait: Patient with difficulty pushing effectively down with B UE, yanira hop 1-2\", 3 small hops, patient did maintain LLE 25% WB with max cues  Gait Deviations: Decreased step length;Decreased step height;Slow Tabatha  Distance: 1/2 foot  Comments: O2 sat 91% and , shown WB on scale about 35lb.   Ambulation 2  Surface - 2: level tile  Device 2: Rolling Walker  Assistance 2: Moderate assistance;Minimal assistance;2 Person assistance  Quality of Gait 2: Patient took 2 small hops about 6\" each cues for 25% WB  Gait Deviations: Slow Tabatha;Decreased step length;Decreased step height;Shuffles  Distance: 1'  Comments: assist of 2 cues for 25% WB  Stairs/Curb  Stairs?: No  Wheelchair Activities  Wheelchair Parts Management: Yes  Right Brakes Level of Assistance: Stand by Assist;Contact guard assistance  Anti-tippers Level of Assistance: Stand by assistance;Contact guard assistance  Propulsion: Yes  Propulsion 1  Propulsion: Manual  Level: Level Tile  Method: RUE;LUE;RLE  Level of Assistance: Stand by assistance  Description/ Details: Patient able to propel w/c with B UE occasional steer with L LE, cues for turns   Distance: 100'     Balance  Posture: Good  Sitting - Static: Good  Sitting - Dynamic: Good  Standing - Static: Fair;+  Standing - Dynamic: Poor;+(posterior lean)  Comments: Patient in parallel bars with minimal assist in stand but up to mod/max asssist when attempt hops  Exercises  Ankle Pumps: 30 per hour in easy pace  Comments: instructed in breathing exercises   Second Session  S/ Patient agreed to therapy despite frustration about his inability to perform activity. O/ Patient supine in bed, performed therapeutic exercies as follows- CG, QS, adductor sets x 15, hipa and knee flexion/ext with assist x 10, hip abduction x 10 with assist, AP x 15 and instruction in deep breathing exercises  Patient supine to sit with moderate assist.  Patient bed to w/c with Stedy. Patient to department attempt stand and few hops as above, able to maintain 25% WB L LE with max cues. Patient return to room , transfer via stedy with cues for 25% WB  Patient in recliner with call light, chair alarm and nurse aware of position.   Plan   Plan  Times per week: 5-6  Times per day: Twice a day  Plan weeks: 2-3 weeks  Current Treatment Recommendations: Strengthening, Transfer Training, Endurance Training, Patient/Caregiver Education & Training, ROM, Wheelchair Mobility Training, Pain Management, Equipment Evaluation, Education, & procurement, Balance Training, Gait Training, [employer], Functional Mobility Training, Stair training, Safety Education & Training  Safety Devices  Type of devices:  All fall risk precautions in place, Gait belt, Call light within reach, Chair alarm in place, Left in chair, Nurse notified  Restraints  Initially in place: No       Goals  Short term goals  Time Frame for Short term goals: 1 week  Short term goal 1: bed mobility with minimal/moderate assistassist  Short term goal 2: transfers at min/mod assist  Short term goal 3: Assess ability to ambulate with wheeled walker 3' with minimal assist, 25% WB L LE  Short term goal 4: Patient propel w/c community distances with SBA  Short term goal 5: bed mobility with contact guard assist  Long term goals  Time Frame for Long term goals : 2- 3 weeks  Long term goal 1: bed mobility with SBA  Long term goal 2: transfers at cont guard assist  Long term goal 3: ambulate with wheeled walker 5' with minimal assist -25% WB L LE  Long term goal 4: Patient propel w/c community distances with MI  Long term goal 5: educate family on steps with w/c versus ramp  Patient Goals   Patient goals : Patient wants to go home and to be cared for by family       Therapy Time   Individual Concurrent Group Co-treatment   Time In 1430         Time Out 1515         Minutes 45         Timed Code Treatment Minutes: [address] Time     Individual Co-treatment   Time In [address] PT # [id number]

## 2020-10-06 LAB
ANION GAP SERPL CALCULATED.3IONS-SCNC: 11 MMOL/L (ref 3–16)
BUN BLDV-MCNC: 35 MG/DL (ref 7–20)
CALCIUM SERPL-MCNC: 9 MG/DL (ref 8.3–10.6)
CHLORIDE BLD-SCNC: 102 MMOL/L (ref 99–110)
CO2: 26 MMOL/L (ref 21–32)
CREAT SERPL-MCNC: 1.1 MG/DL (ref 0.8–1.3)
GFR AFRICAN AMERICAN: >60
GFR NON-AFRICAN AMERICAN: >60
GLUCOSE BLD-MCNC: 188 MG/DL (ref 70–99)
HCT VFR BLD CALC: 28.8 % (ref 40.5–52.5)
HEMOGLOBIN: 9.5 G/DL (ref 13.5–17.5)
MCH RBC QN AUTO: 30.9 PG (ref 26–34)
MCHC RBC AUTO-ENTMCNC: 33.1 G/DL (ref 31–36)
MCV RBC AUTO: 93.4 FL (ref 80–100)
PDW BLD-RTO: 13.4 % (ref 12.4–15.4)
PLATELET # BLD: 289 K/UL (ref 135–450)
PMV BLD AUTO: 8.3 FL (ref 5–10.5)
POTASSIUM SERPL-SCNC: 4.3 MMOL/L (ref 3.5–5.1)
RBC # BLD: 3.09 M/UL (ref 4.2–5.9)
SODIUM BLD-SCNC: 139 MMOL/L (ref 136–145)
WBC # BLD: 13.1 K/UL (ref 4–11)

## 2020-10-06 PROCEDURE — 6370000000 HC RX 637 (ALT 250 FOR IP): Performed by: PHYSICAL MEDICINE & REHABILITATION

## 2020-10-06 PROCEDURE — 6360000002 HC RX W HCPCS: Performed by: PHYSICAL MEDICINE & REHABILITATION

## 2020-10-06 PROCEDURE — 80048 BASIC METABOLIC PNL TOTAL CA: CPT

## 2020-10-06 PROCEDURE — 97110 THERAPEUTIC EXERCISES: CPT

## 2020-10-06 PROCEDURE — 36415 COLL VENOUS BLD VENIPUNCTURE: CPT

## 2020-10-06 PROCEDURE — 85027 COMPLETE CBC AUTOMATED: CPT

## 2020-10-06 PROCEDURE — 2580000003 HC RX 258: Performed by: PHYSICAL MEDICINE & REHABILITATION

## 2020-10-06 PROCEDURE — 97530 THERAPEUTIC ACTIVITIES: CPT

## 2020-10-06 PROCEDURE — 1280000000 HC REHAB R&B

## 2020-10-06 RX ADMIN — PANTOPRAZOLE SODIUM 40 MG: 40 TABLET, DELAYED RELEASE ORAL at 06:19

## 2020-10-06 RX ADMIN — Medication 10 ML: at 07:25

## 2020-10-06 RX ADMIN — DOCUSATE SODIUM 50 MG AND SENNOSIDES 8.6 MG 2 TABLET: 8.6; 5 TABLET, FILM COATED ORAL at 07:25

## 2020-10-06 RX ADMIN — TAMSULOSIN HYDROCHLORIDE 0.4 MG: 0.4 CAPSULE ORAL at 07:26

## 2020-10-06 RX ADMIN — ENOXAPARIN SODIUM 40 MG: 40 INJECTION SUBCUTANEOUS at 07:25

## 2020-10-06 RX ADMIN — LEVOTHYROXINE SODIUM 50 MCG: 0.05 TABLET ORAL at 06:19

## 2020-10-06 ASSESSMENT — PAIN SCALES - GENERAL
PAINLEVEL_OUTOF10: 0
PAINLEVEL_OUTOF10: 0

## 2020-10-06 ASSESSMENT — PAIN DESCRIPTION - PROGRESSION: CLINICAL_PROGRESSION: GRADUALLY IMPROVING

## 2020-10-06 NOTE — PLAN OF CARE
Problem: Falls - Risk of:  Goal: Will remain free from falls  Description: Will remain free from falls  Outcome: Ongoing  Calls for assistance as neded      Problem: Falls - Risk of:  Goal: Absence of physical injury  Description: Absence of physical injury  Outcome: Ongoing     Problem: Pain:  Goal: Patient's pain/discomfort is manageable  Description: Patient's pain/discomfort is manageable  Outcome: Ongoing  Denies pain over night

## 2020-10-06 NOTE — PROGRESS NOTES
Patient is resting comfortably in the bed. Denies  pain over night. L hip dressing to the the L hip is cdi. Patient is occassionally incontinent of urine. Patient communicates appropriately, is worried about his wife who is cogitatively impaired. Emotional support provided. Patient with poor appetite, offered boast drinks and he declined. Call light  placed within each.

## 2020-10-06 NOTE — PATIENT CARE CONFERENCE
[facility] Rehabilitation  Weekly Team Conference Note      Date: 10/7/2020  Patient Name:  Chuck Rosales    MRN: [id number]  : 1927  Gender: Male  Physician: Dr. Anthony Quiñonez  Diagnosis: Closed fracture of right femur, sequela [S72.91XS]    CASE MANAGEMENT  Assessment:     Goal is home       PHYSICAL THERAPY    Bed mobility  Bridging: Moderate assistance(Pt required moderate verbal cues to position trunk correctly, required moderate assistance for proper positioning of LLE.)  Rolling to Left: Unable to assess(Unable to assess due to time contraints)  Rolling to Right: Unable to assess(Unable to assess due to time contraints)  Supine to Sit: Minimal assistance(HOB max elevated and use of bed rail. assist for LLE)  Sit to Supine: Minimal assistance(assist for LLE, pt trying very hard to complete without assist)  Scooting: (pt able to scoot toward middle of the bed, but very fatigued at end of session so dependentx2 to scoot to [facility] and position)  Comment: Above transfers performed in bed in room with HOB flat. Transfers:  Sit to Stand: Moderate Assistance, Maximum Assistance, 2 Person Assistance(in parallel bars mod/ max in parallel bars, moderate assist of 2 with wheeled walker)  Stand to sit: Minimal Assistance, Moderate Assistance  Bed to Chair: Dependent/Total(used May jacobs in PM)  Squat Pivot Transfers: Moderate Assistance, Maximum Assistance(Pt required min-mod A to sit EOB, mod-max A to scoot back on bed once sitting EOB, used right bed railing)  Lateral Transfers: Moderate Assistance, Maximum Assistance(Recliner-->chair with slideboard: mod-max A going to the right)  Comment: Pt required mod-max cues to maintain PWB of LLE as well as proper foot positioning throughout session, increased time to perform all activities secondary to fatigue.     Ambulation 1  Surface: level tile, unable to tolerate standing for ambulation 10/6/2020    Propulsion: Manual  Level: Level Tile  Method: JOHN, JAIR, RLE  Level of Assistance: Stand by assistance(Pt required cues to use RLE to assist BUE with w/c propulsion)  Description/ Details: Pt propelled w/c 278' on level tile using BUE, required mod cues to use LLE to assist  Distance: 278' on level tile           Car Transfer: Moderate Assistance, Maximum Assistance(Pt required mod-max A and mod-max cues to perform slideboard transfer to car, going to the left, required cues for proper positoning to place slideboard)      Assessment: Pt is progressing with functional mobility tasks but remains limited by decreased aerobic capacity. Pt was able to propel wheelchair 278' on level tile with mod cues to encourage use of RLE to assist. Pt is frustrated about how much he feels like he can do at this current time. Pt able to perform lateral transfers using a slideboard with min-mod A from recliner to chair and mock car<-->chair. Pt would continue to benefit from skilled IP rehab in order to improve strength and aerobic capacity to return home at [facility]. SPEECH THERAPY (intentionally left blank if not actively being seen by this service):      OCCUPATIONAL THERAPY    ADL  Grooming: Setup, Verbal cueing, Stand by assistance(pt performed oral care with setup, required encouragement to do without assist)  UE Bathing: Verbal cueing, Setup  LE Bathing: Moderate assistance(bathed to ankles, assisted with feet. Stood with mod assist to have buttocks bathed)  UE Dressing: Setup, Minimal assistance(min adjustment of shirt in back assisted to button)  LE Dressing: Dependent/Total(assisted with all parts)  Additional Comments: wet towel for non skid surface in shower, IV covered to waterproof. Pt complained of being lightheaded, then dry heaving, no emesis    Bed mobility  Bridging:  Moderate assistance(Pt required moderate verbal cues to position trunk correctly, required moderate assistance for proper positioning of LLE.)  Rolling to Left: Unable to details. NURSING  Can be incontinent of bladder and bowel. Monitor and maintain skin integrity, incision care. Family Education: Patient education: hip safety, 25% weight bearing to left leg, skin and incision care, bladder and bowel program/scheudle as needed, medications, pain control, po intake, COPD. MEDICAL  Patient has opted to go home. Working with hospice for transition    TEAM SUMMARY AND DISCHARGE PLAN  Estimated Length of Stay: DC 10/7/20  Destination: home with hospice   · Anticipated Services at Discharge:    [x] OT  [x] PT   [] SLP    [] RN   [] Home Health aide []   Community Resources: _______________________________  Equipment recommendations:  [] Hospital bed [] Tub bench  [] Shower chair [x] Hand held shower  [] Raised toilet seat [] Toilet safety frame [x] Bedside commode (drop-arm?)  [x] W/C: __18\" width standard, elevated leg rest, anti tippers, flip back arm rests, standard cushion___  [] Rolling Walker [] Standard walker [] Gait belt [] cane: _________  [] Sliding board [] Alternate seating/furniture [] O2 [x] Hip Kit (possibly 3-piece): _______  [] Life Line [x] Other: __slide board (continue to assess pending progression_____  Factors facilitating achievement of predicted outcomes: Family support and Good insight into deficits  Barriers to the achievement of predicted outcomes/Interventions: generalized weakness, decreased activity tolerance -home therapy for strengthening, conditioning, compensatory strategies and family education for safe transfers, mobility and self-care      Interdisciplinary Individualized Plan of Care Review:    · Continue Current Plan of Care: Yes    · Modifications:_____________________________    Special Needs in the Upcoming Week :    [] Family/Caregiver Education  [] Home visit  []Therapeutic Pass   [] Consults:_______    [] Other;_______    Patient Rehab Team Goals for the Upcoming Week:  1.  Safe transition home with hospice, per patient's wishes Team Members Present at Conference:  Physician: Dr Yancy Jordan  : [place], [place]    Occupational Therapist: Ian Billings, OTR/L 21   Physical Therapist: Lashay Oropeza, [id number]  Speech Therapist:   Nurse: Camilo Lorenzo RN, [address] [place] [address]   Lissett Sullivan      I led this team conference and I approve the established interdisciplinary plan of care as documented within the medical record of Valentino Canard. MD: Natalia Jarquin.  Yancy Jordan MD 10/7/2020, 11:06 AM

## 2020-10-06 NOTE — PROGRESS NOTES
Department of Physical Medicine & Rehabilitation  Progress Note    Patient Identification:  Gayatri Sargent  [id number]  : 1927  Admit date: 10/4/2020    Chief Complaint: Closed fracture of right femur, sequela    Subjective:   No acute events overnight. Patient seen this afternoon sitting up in gym. Reports feeling a little better compared to yesterday. Still frustrated by his deficits. Has not been eating/drinking much due to thinking we would give him nutrition through IV. I provided education and encouraged increased po intake. ROS: No f/c, n/v, cp     Objective:  Patient Vitals for the past 24 hrs:   BP Temp Temp src Pulse Resp SpO2 Height Weight   10/06/20 0450 (!) 149/73 98.1 °F (36.7 °C) Oral 85 18 90 % -- 156 lb 15.5 oz (71.2 kg)   10/05/20 2042 129/71 99 °F (37.2 °C) Oral 99 18 94 % -- --   10/05/20 2005 -- -- -- -- 16 94 % -- --   10/05/20 1626 127/72 97.9 °F (36.6 °C) Oral 98 16 92 % -- --   10/05/20 1322 -- -- -- -- -- -- 6' (1.829 m) --     Const: Alert. No distress, pleasant. HEENT: Normocephalic, atraumatic. Normal sclera/conjunctiva. MMM. CV: Regular rate and rhythm. Resp: No respiratory distress. Lungs decreased at R>L base. Abd: Soft, nontender, nondistended, NABS+   Ext: No edema. MSK: decreased left hip ROM  Neuro: Alert, oriented, appropriately interactive. Psych: Cooperative, appropriate mood and affect    Laboratory data: Available via EMR.    Last 24 hour lab  Recent Results (from the past 24 hour(s))   Basic Metabolic Panel    Collection Time: 10/06/20  7:23 AM   Result Value Ref Range    Sodium 139 136 - 145 mmol/L    Potassium 4.3 3.5 - 5.1 mmol/L    Chloride 102 99 - 110 mmol/L    CO2 26 21 - 32 mmol/L    Anion Gap 11 3 - 16    Glucose 188 (H) 70 - 99 mg/dL    BUN 35 (H) 7 - 20 mg/dL    CREATININE 1.1 0.8 - 1.3 mg/dL    GFR Non-African American >60 >60    GFR African American >60 >60    Calcium 9.0 8.3 - 10.6 mg/dL   CBC    Collection Time: 10/06/20  7:23 AM Result Value Ref Range    WBC 13.1 (H) 4.0 - 11.0 K/uL    RBC 3.09 (L) 4.20 - 5.90 M/uL    Hemoglobin 9.5 (L) 13.5 - 17.5 g/dL    Hematocrit 28.8 (L) 40.5 - 52.5 %    MCV 93.4 80.0 - 100.0 fL    MCH 30.9 26.0 - 34.0 pg    MCHC 33.1 31.0 - 36.0 g/dL    RDW 13.4 12.4 - 15.4 %    Platelets 888 461 - 548 K/uL    MPV 8.3 5.0 - 10.5 fL       Therapy progress:  PT  Position Activity Restriction  Other position/activity restrictions: 25% PWB left LE, IV left anticubital , new diagnosis hepatic metastatic disease  Objective     Sit to Stand: Moderate Assistance, Maximum Assistance, 2 Person Assistance(in parallel bars mod/ max in parallel bars, moderate assist of 2 with wheeled walker)  Stand to sit: Minimal Assistance, Moderate Assistance  Bed to Chair: Dependent/Total(used Kylie jacobs in PM)  Device: Parallel Bars  Other Apparatus: Wheelchair follow  Distance: 1/2 foot  OT  PT Equipment Recommendations  Other: Continue to assess, Pt will most likely be the most independent with functional mobility tasks at the w/c level upon d/c. Toilet Transfers Comments: Pt too fatigued to complete all transfers this date  Assessment        SLP          Body mass index is 21.29 kg/m².     Assessment and Plan:    Impairments: 25% WB LLE, decreased balance, endurance, safety awareness     Left intertrochanteric femur fracture, femoral neck fracture   -s/p ORIF with cephalomedullary nail (9/30 with Dr. Pattie Soto  -Wound care  -25% WB LLE  -PT/OT     Hepatic lesions, right juxta cardiac adenopathy  -Presumed metastatic disease, unknown primary  -No further work-up per patient wishes, does not plan to pursue treatment  -Palliative care consulted and will follow at home for long-term needs     Acute blood loss anemia   -Post-surgical.   -Monitor Hgb, transfuse prn <7.      Acute hypoxic respiratory failure  -Supplemental O2, wean as tolerated  -IS  -Breathing treatments for COPD as below     Chronic right pleural effusion  -Follows with Pulm as outpatient     COPD  -Symbicort, Spiriva, prn duoneb     HTN-->post-op hypotension  -Monitor, not currently on meds     SHREE  -Improved with IVF, encourage po intake  -Avoid nephrotoxins, renally dose meds  -Monitor      Hypothyroidism  -Levothyroxine     Bladder   -High risk retention   -Monitor PVRs, SC prn >300cc  -Flomax     Bowel   -High risk constipation   -senna+colace BID, PRN miralax, MoM, and bisacodyl supp.     Pain control  -prn Norco     PPx  -DVT: lovenox while inpatient, then ASA 81 BID x 30 days per Ortho  -GI: pantoprazole     CODE STATUS - DNR-CCA    Rehab Progress: Tolerating therapy thus far. Does have decreased endurance. Working on functional mobility, compensatory strategies. Anticipated Dispo: home with wife, will need additional supervision/assist  Services/DME: [facility]  ELOS: 2-3 weeks      Omari Jenkins MD 10/6/2020, 11:03 AM

## 2020-10-06 NOTE — PROGRESS NOTES
Patient admitted to rehab with left hip fx. A/A/O x4. Transfers with stedy x2. On general diet, very poor appetite. Medications taken whole in thins. On teds/lovenox for DVT prophylaxis. Skin warm, dry. Surgical incision to left hip, rash on back. On room air. Has been continent of bowel and bladder. LBM 10/5. Chair/bed alarms in use and call light in reach. PIV left AC. Will monitor for safety.

## 2020-10-06 NOTE — PROGRESS NOTES
Physical Therapy  Facility/Department: [address] IP REHAB  Daily Treatment Note  NAME: Nimisha Jessica  : 1927  MRN: [id number]    Date of Service: 10/6/2020    Discharge Recommendations:  Continue to assess pending progress, 5-7 sessions per week, S Level 4   PT Equipment Recommendations  Other: Continue to assess, Pt will most likely be the most independent with functional mobility tasks at the w/c level upon d/c. Assessment   Body structures, Functions, Activity limitations: Decreased functional mobility ; Decreased ADL status; Decreased balance  Assessment: Pt is progressing with functional mobility tasks but remains limited by decreased aerobic capacity. Pt was able to propel wheelchair 278' on level tile with mod cues to encourage use of RLE to assist. Pt is frustrated about how much he feels like he can do at this current time. Pt able to perform lateral transfers using a slideboard with min-mod A from recliner to chair and mock car<-->chair. Pt would continue to benefit from skilled IP rehab in order to improve strength and aerobic capacity to return home at [facility]. Treatment Diagnosis: Impaired functional mobility  Prognosis: Good  Decision Making: Medium Complexity  History: as noted  Exam: as above  Clinical Presentation: evolving  PT Education: Goals;PT Role;Plan of Care;Transfer Training;Weight-bearing Education;Gait Training;Functional Mobility Training;General Safety; Adaptive Device Training;Disease Specific Education  Patient Education: W/c mobility, 25% WB LLE, PLB  Barriers to Learning: Decreased confidence  REQUIRES PT FOLLOW UP: Yes  Activity Tolerance  Activity Tolerance: Patient limited by fatigue;Patient limited by endurance  Activity Tolerance: Pt needed increased time to perform activities secondary to fatigue. Patient Diagnosis(es): There were no encounter diagnoses.      has a past medical history of Arthritis, BPH with obstruction/lower urinary tract symptoms, Diverticular disease, Ectropion of right eye, Emphysema of lung ([address] Hypercholesteremia, Macular degeneration, Moderate mitral regurgitation, PONV (postoperative nausea and vomiting), Squamous cell skin cancer, Vitamin D deficiency, and Wears glasses. has a past surgical history that includes Mohs surgery; Cataract removal with implant (Bilateral); and Colonoscopy (06/20/2016). Social/Functional History  Lives With: Spouse(states wife has difficulty with her memory, he is her caregiver, but other family members are assisting at this time)  Type of Home: House  Home Layout: One level  Home Access: Stairs to enter with rails  Entrance Stairs - Number of Steps: 2 FINN  Entrance Stairs - Rails: Left  Bathroom Shower/Tub: Walk-in shower, Shower chair with back  Bathroom Toilet: Standard  Bathroom Equipment: Grab bars in shower, Grab bars around toilet  Bathroom Accessibility: Walker accessible  Home Equipment: [employer], Standard walker, [facility]  Receives Help From: (8 Children -  6 are in town)  ADL Assistance: Independent  Homemaking Assistance: (Light meal prep, pt does laundry)  Ambulation Assistance: Independent  Transfer Assistance: Independent  Active : Yes(no night driving)  Occupation: Retired  Type of occupation: [employer]  Additional Comments: Denies other recent falls    Restrictions  Restrictions/Precautions  Restrictions/Precautions: Fall Risk, [place]olay Bearing  Lower Extremity Weight Bearing Restrictions  Left Lower Extremity Weight Bearing: Partial Weight Bearing  Partial Weight Bearing Percentage Or Pounds: 25% WBing  Position Activity Restriction  Other position/activity restrictions: 25% PWB left LE, IV left anticubital , new diagnosis hepatic metastatic disease  Subjective   General  Chart Reviewed: Yes  Additional Pertinent Hx: Pt is 80 y.o. M who presents s/p mechanical fall in kitchen resulting in closed pertrochanteric fracture of L femur.  Pt is s/p L hip nailing and is 25% WBing. PMH: Emphysema of lung, skin Ca. Per Oncology consult note: \"During the course of his evaluation he had a scan of the chest without contrast as part of his trauma work-up. This study dated 9/29/2020 showed interval enlargement of right cardio supra diaphragmatic lymph nodes there were also multiple new hepatic metastatic lesions. A CT scan of the abdomen and pelvis was done today which also confirmed a new enlarged right pericardiophrenic lymph node and an incompletely healed compression fracture at L4.\"  Patient is opting not to pursue investigation/treatment of this issue. Response To Previous Treatment: Patient with no complaints from previous session. Family / Caregiver Present: No  Referring Practitioner: Dr. Joe Purdy: Pt sitting in recliner this morning with feet elevated and one pillow under LLE. Pt denies any pain this morning and agreeable to therapy. Pt expressed frustration throughout session about the inability to \"do anything without help. \"  Pain Screening  Patient Currently in Pain: Denies  Pain Assessment  Clinical Progression: Gradually improving  Response to Pain Intervention: Patient Satisfied  Vital Signs  Patient Currently in Pain: Denies       Orientation  Orientation  Overall Orientation Status: Within Functional Limits     Objective   Bed mobility  Bridging: Moderate assistance(Pt required moderate verbal cues to position trunk correctly, required moderate assistance for proper positioning of LLE to help relieve pain and decreased WB to maintain precuation.)  Rolling to Left: Unable to assess(Unable to assess due to time contraints)  Rolling to Right: Unable to assess(Unable to assess due to time contraints)  Supine to Sit: Unable to assess(Unable to assess due to time contraints)  Sit to Supine: Moderate assistance(Pt required mod assistance to bring LLE into bed, moderate cueing to bring RLE into bed, used R bed rail)  Scooting:  Moderate assistance(Pt required mod-A at the trunk to properly position in bed, mod-A to to position LLE for bridge to assist with scooting.)  Comment: Above transfers performed in bed in room with HOB flat. Transfers  Squat Pivot Transfers: Moderate Assistance;Maximum Assistance(Pt required min-mod A to sit EOB, mod-max A to scoot back on bed once sitting EOB, used right bed railing)  Lateral Transfers: Moderate Assistance;Maximum Assistance(Recliner-->chair with slideboard: mod-max A going to the right)  Car Transfer: Moderate Assistance;Maximum Assistance(Pt required mod-max A and mod-max cues to perform slideboard transfer to car, going to the left, required cues for proper positoning to place slideboard)  Comment: Pt required mod-max cues to maintain PWB of LLE as well as proper foot positioning throughout session, increased time to perform all activities secondary to fatigue.   Ambulation  Ambulation?: No  More Ambulation?: No  Stairs/Curb  Stairs?: No  Wheelchair Activities  Wheelchair Parts Management: Yes  Left Armrest Level of Assistance: Dependent/Total(Pt dependent for left arm rest removal due to time contraints)  Left Leg Rest Level of Assistance: Dependent/Total(Pt dependent for left leg rest adjustment due to time contraints)  Right Leg Rest Level of Assistance: Dependent/Total(Pt dependent for right leg rest adjustments due to time contraints)  Left Brakes Level of Assistance: Stand by assistance(Pt required min cues for right brake management)  Right Brakes Level of Assistance: Stand by Assist(Pt required min cues for left brake management)  Propulsion: Yes  Propulsion 1  Propulsion: Manual  Level: Level Tile  Level of Assistance: Stand by assistance(Pt required cues to use RLE to assist BUE with w/c propulsion)  Description/ Details: Pt propelled w/c 278' on level tile using BUE performing multiple turns, pauses multiple time briefly needing increased time due to fatigue, required mod cues to use LLE to assist  Distance: 46' on level tile     Balance  Posture: Good  Sitting - Static: Good  Sitting - Dynamic: Good  Comments: Pt able to sit EOB with SBA, bed in room, HOB flat. Comment: Pt positioned in bed per Pt request after session, HOB elevated 35 degrees, two pillows under head, one pillow under LLE                        PM session:   S: Pt denies any pain this afternoon, agreeable to PT session. Pt verbalized frustration with feeling as if he isn't able to do much.  O: Wheelchair mobility: Pt propelled w/c 160' on level tile, required mod-max encouragement to use RLE to assist with w/c propulsion. Bed Mobility: The following performed In bed in room with HOB elevated to 35 degrees: Roll left: not assessed due to precautions and time constraints. Roll right: min-mod A with cues, use of bed rail and mod-A to bring LLE off of bed. Performed in bed with HOB maximally elevated: Supine<-->sit mod A required to bring LLE off of bed with use of right bed rail. Transfers: Lateral transfers: Bed-->chair using a slideboard going to the left, min-mod A, HOW elevated to 35 degrees. Chair-->bed: min-mod-A using a slideboard, going to the right, Pt required mod-max cues for proper hand and foot placement. Seated Exercises in Wheelchair: LAQ x20 bilat, Pt required mod-A to reach full extension on LLE. Knee flexion x20 bilat, glute squeezes x15. Pt required increased time to perform all activities due to decreased strength, fatigue, and fear of moving LLE. Constant education recover and progression with therapy to promote participation throughout session. A: Pt tolerated this afternoon's session well with no complaints, but remains limited due to fatigue, decreased strength, and aerobic capacity. Pt would continue to benefit from skilled IP rehab in order to improve strength and aerobic capacity in order to return home to [facility] and reduce the risk of future falls.     Safety Device - Type of devices:  [x]  All fall risk precautions in place [] Bed alarm in place  [x] Call light within reach [] Chair alarm in place [] Positioning belt [x] Gait belt [x] Patient at risk for falls [x] Left in bed [] Left in chair [] Telesitter in use [] Sitter present [] Nurse notified []  None (Pt was positioned in bed with HOB elevated to 35 degrees, two pillows under head, one pillow under LLE and all items needed within reach. Goals  Short term goals  Time Frame for Short term goals: 1 week  Short term goal 1: bed mobility with minimal/moderate assistassist  Short term goal 2: transfers at min/mod assist  Short term goal 3: Assess ability to ambulate with wheeled walker 3' with minimal assist, 25% WB L LE  Short term goal 4: Patient propel w/c community distances with SBA  Short term goal 5: bed mobility with contact guard assist  Long term goals  Time Frame for Long term goals : 2 - 3 weeks  Long term goal 1: bed mobility with MI  Long term goal 2: transfers at cont guard assist  Long term goal 3: ambulate with wheeled walker 5' with minimal assist -25% WB L LE  Long term goal 4: Patient propel w/c community distances with MI  Long term goal 5: educate family on steps with w/c versus ramp  Patient Goals   Patient goals : Patient wants to go home and to be cared for by family    Plan    Plan  Times per week: 5-6  Times per day: Twice a day  Plan weeks: 2-3 weeks  Current Treatment Recommendations: Strengthening, Transfer Training, Endurance Training, Patient/Caregiver Education & Training, ROM, Wheelchair Mobility Training, Pain Management, Equipment Evaluation, Education, & procurement, Balance Training, Gait Training, Home Exercise Program, Functional Mobility Training, Stair training, Safety Education & Training  Safety Devices  Type of devices:  All fall risk precautions in place, Gait belt, Call light within reach, Left in bed, Patient at risk for falls, Bed alarm in place  Restraints  Initially in place: No     Therapy Time Individual Concurrent Group Co-treatment   Time In 0815         Time Out 0900         Minutes 45         Timed Code Treatment Minutes: [address] [facility] Time     Individual Co-treatment   Time In 1300     Time Out 2740     Minutes [address], SPT  Therapist was present, directed the patient's care, made skilled judgement, and was responsible for assessment and treatment of the patient.           Electronically signed by Jairo Hanna PT on 10/6/20 at 4:20 PM EDT

## 2020-10-06 NOTE — PLAN OF CARE
Problem: Falls - Risk of:  Goal: Will remain free from falls  Description: Will remain free from falls  10/6/2020 1014 by Vannessa Mazariegos RN  Outcome: Ongoing     Problem: Skin Integrity:  Goal: Will show no infection signs and symptoms  Description: Will show no infection signs and symptoms  10/6/2020 1014 by Vannessa Mazariegos RN  Outcome: Ongoing     Problem: Skin Integrity:  Goal: Absence of new skin breakdown  Description: Absence of new skin breakdown  10/6/2020 1014 by Vannessa Mazariegos RN  Outcome: Ongoing     Problem: Pain:  Goal: Patient's pain/discomfort is manageable  Description: Patient's pain/discomfort is manageable  10/6/2020 1014 by Vannessa Mazariegos RN  Outcome: Ongoing     Problem: Nutritional:  Goal: Ability to attain and maintain optimal nutritional status will improve  Description: Ability to attain and maintain optimal nutritional status will improve  10/6/2020 1014 by Vannessa Mazariegos RN  Outcome: Ongoing     Problem: Nutrition  Goal: Optimal nutrition therapy  10/6/2020 1014 by Vannessa Mazariegos RN  Outcome: Ongoing

## 2020-10-06 NOTE — PROGRESS NOTES
Occupational Therapy  Facility/Department: [address] IP REHAB  Daily Treatment Note  NAME: Soha Dodson  : 1927  MRN: [id number]    Date of Service: 10/6/2020    Discharge Recommendations:  Home with Home health OT, Continue to assess pending progress, 24 hour supervision or assist       Assessment   Performance deficits / Impairments: Decreased endurance;Decreased strength;Decreased functional mobility ; Decreased ADL status; Decreased balance  Assessment: Pt demo'd improvement in fxl transfers this date, but limited by decreased activity tolerance. Pt completed slide board transfer to the left mod A, then able to complete to the right with CGA with mod/max cues for sequencing and monitoring of WB status. Pt completed stand pivot transfer <> commode with min/mod A and use of grab bars. Pt is very slow moving, but works hard during session. Cont per OT POC  Treatment Diagnosis: decreased ADL, IADL, balance, activity tolerance  Prognosis: Fair  OT Education: OT Role;Transfer Training;Plan of Care;Precautions; Equipment; Energy Conservation  Patient Education: 25% PWB, possible equipment needs  REQUIRES OT FOLLOW UP: Yes  Activity Tolerance  Activity Tolerance: Patient limited by fatigue  Activity Tolerance: required frequent rest breaks  Safety Devices  Safety Devices in place: Yes  Type of devices: Bed alarm in place;Call light within reach; Left in bed;Patient at risk for falls;Gait belt         Patient Diagnosis(es): There were no encounter diagnoses. has a past medical history of Arthritis, BPH with obstruction/lower urinary tract symptoms, Diverticular disease, Ectropion of right eye, Emphysema of lung ([address] Hypercholesteremia, Macular degeneration, Moderate mitral regurgitation, PONV (postoperative nausea and vomiting), Squamous cell skin cancer, Vitamin D deficiency, and Wears glasses. has a past surgical history that includes Mohs surgery; Cataract removal with implant (Bilateral);  Colonoscopy (06/20/2016); and Hip fracture surgery (Left, 9/30/2020). Restrictions  Restrictions/Precautions  Restrictions/Precautions: Fall Risk, Weight Bearing  Lower Extremity Weight Bearing Restrictions  Left Lower Extremity Weight Bearing: Partial Weight Bearing  Partial Weight Bearing Percentage Or Pounds: 25% WBing  Position Activity Restriction  Other position/activity restrictions: 25% PWB left LE, IV left anticubital , new diagnosis hepatic metastatic disease  Subjective   General  Chart Reviewed: Yes, Orders, Progress Notes  Additional Pertinent Hx: A 80year old  male who presents to the hospital, status post mechanical fall when he was turning around,trying to grab on to something and lost his balance followed by excruciating pain in the left hip and inability to get up from theground without any nausea, vomiting, fevers, or chills. Patient had ORIF on 9/30. In the emergency room he did have a CT which showed chronic right pleural effusion with pleural thickening and adjacent right lower lobe atelectasis, emphysema, chronic interstitial changes, stable left pulm nodules and pleural plaques. New hepatic metastatic disease. Right juxta cardiac adenopathy concerning for metastatic disease. Hiatal hernia with GERD(copied per note Leisa Jang, APRN, CNP 10/4/2020)  Family / Caregiver Present: No  Referring Practitioner: Kyung Lopez  Diagnosis: Closed pertrochanteric fracture of L femur  Subjective  Subjective: pt met b/s for OT. pt supine in bed and agreeable to therapy.  pt states his pain is \"not too bad\" but feels his LLE is stiff      Orientation     Objective    ADL  Grooming: Setup;Verbal cueing;Stand by assistance(pt performed oral care with setup, required encouragement to do without assist)        Balance  Sitting Balance: Stand by assistance  Standing Balance: Contact guard assistance(with use of grab bars)  Functional Mobility  Functional - Mobility Device: Wheelchair  Activity: To/from for OT. Pt sleeping in bed but wakes easy and agreed to therapy. Pt denied pain    Objective:  Pt completed bed mobility supine>sit with slight min A for LLE with HOB elevated and use of bed rail. Pt sat EOB and used 3# weight for x10 reps: bicep curls, supination/pronation, chest press, internal/external rotation. Pt tolerated well but required cues for rest breaks to conserve energy as pt demo's PLASCENCIA and tries to push himself to do more. Pt scooted closer to [facility] with mod vc's. Pt completed sit>supine with min A for LLE and cues for positioning. Pt was able to use the headboard to scoot up toward [facility]. Pt was positioned for comfort in bed with alarm engaged, needs in reach. Assessment: pt tolerated tx fair but was limited by fatigue after full day of therapy so completed modified tx in bed/EOB. Cont per OT PCO    Plan   Plan  Times per week: 5-6 days per week  Times per day: Twice a day  Plan weeks: 2-3 weeks  Current Treatment Recommendations: Endurance Training, Strengthening, Balance Training, Functional Mobility Training, Safety Education & Training, Equipment Evaluation, Education, & procurement, Patient/Caregiver Education & Training, Self-Care / ADL, Wheelchair Mobility Training       Goals  Short term goals  Time Frame for Short term goals: 1 week pt will. Elly Menonin term goal 1: bathe with min assist and AD  Short term goal 2: dress UB after set up, LB with mod assist and AD  Short term goal 3: transfer with min assist and AD, maintainting 25% PWB LLE  Short term goal 4: functional mobility using wheelchair with min assist  Short term goal 5: increase activity tolerance to stand 2 min for ADL  Short term goal 6: toilet with max assist and AD  Long term goals  Time Frame for Long term goals : 2-3 weeks  Long term goal 1: bathe with supervision/set up  Long term goal 2: dress UB indep, LB with min assist  Long term goal 3: toilet with CGA and AD  Long term goal 4: transfer with CGA/SBA using appropriate AD  Long term goal 5: functional mobility in wheelchair with supervision  Patient Goals   Patient goals : \"I want to be able to get home\"  \"I need to be able to dress myself\"       Therapy Time   Individual Concurrent Group Co-treatment   Time In 1110         Time Out 1210         Minutes 60            Therapy Time   Individual Co-treatment   Time In [place]     Time Out 1510     Minutes Ryan [address], OTR/L [id number]

## 2020-10-07 ENCOUNTER — TELEPHONE (OUTPATIENT)
Dept: FAMILY MEDICINE CLINIC | Age: 85
End: 2020-10-07

## 2020-10-07 VITALS
OXYGEN SATURATION: 95 % | SYSTOLIC BLOOD PRESSURE: 138 MMHG | RESPIRATION RATE: 17 BRPM | BODY MASS INDEX: 21.2 KG/M2 | HEIGHT: 72 IN | TEMPERATURE: 97.9 F | HEART RATE: 80 BPM | WEIGHT: 156.53 LBS | DIASTOLIC BLOOD PRESSURE: 61 MMHG

## 2020-10-07 PROCEDURE — 94760 N-INVAS EAR/PLS OXIMETRY 1: CPT

## 2020-10-07 PROCEDURE — 97535 SELF CARE MNGMENT TRAINING: CPT

## 2020-10-07 PROCEDURE — 6360000002 HC RX W HCPCS: Performed by: PHYSICAL MEDICINE & REHABILITATION

## 2020-10-07 PROCEDURE — 94640 AIRWAY INHALATION TREATMENT: CPT

## 2020-10-07 PROCEDURE — 97530 THERAPEUTIC ACTIVITIES: CPT

## 2020-10-07 PROCEDURE — 6370000000 HC RX 637 (ALT 250 FOR IP): Performed by: PHYSICAL MEDICINE & REHABILITATION

## 2020-10-07 RX ORDER — LORAZEPAM 1 MG/1
1 TABLET ORAL EVERY 8 HOURS PRN
Qty: 15 TABLET | Refills: 0 | Status: SHIPPED | OUTPATIENT
Start: 2020-10-07 | End: 2020-10-12

## 2020-10-07 RX ORDER — HYDROCODONE BITARTRATE AND ACETAMINOPHEN 5; 325 MG/1; MG/1
1 TABLET ORAL EVERY 6 HOURS PRN
Qty: 20 TABLET | Refills: 0 | Status: SHIPPED | OUTPATIENT
Start: 2020-10-07 | End: 2020-10-12

## 2020-10-07 RX ORDER — ONDANSETRON 4 MG/1
4 TABLET, ORALLY DISINTEGRATING ORAL EVERY 8 HOURS PRN
Qty: 20 TABLET | Refills: 0 | Status: SHIPPED | OUTPATIENT
Start: 2020-10-07 | End: 2020-10-17

## 2020-10-07 RX ORDER — ASPIRIN 81 MG/1
81 TABLET ORAL 2 TIMES DAILY
Qty: 60 TABLET | Refills: 0 | Status: SHIPPED | OUTPATIENT
Start: 2020-10-07 | End: 2020-11-20

## 2020-10-07 RX ADMIN — ENOXAPARIN SODIUM 40 MG: 40 INJECTION SUBCUTANEOUS at 07:27

## 2020-10-07 RX ADMIN — DOCUSATE SODIUM 50 MG AND SENNOSIDES 8.6 MG 2 TABLET: 8.6; 5 TABLET, FILM COATED ORAL at 07:27

## 2020-10-07 RX ADMIN — TIOTROPIUM BROMIDE INHALATION SPRAY 2 PUFF: 3.12 SPRAY, METERED RESPIRATORY (INHALATION) at 07:48

## 2020-10-07 RX ADMIN — PANTOPRAZOLE SODIUM 40 MG: 40 TABLET, DELAYED RELEASE ORAL at 06:02

## 2020-10-07 RX ADMIN — LEVOTHYROXINE SODIUM 50 MCG: 0.05 TABLET ORAL at 06:02

## 2020-10-07 RX ADMIN — TAMSULOSIN HYDROCHLORIDE 0.4 MG: 0.4 CAPSULE ORAL at 07:27

## 2020-10-07 NOTE — PLAN OF CARE
Problem: Falls - Risk of:  Goal: Will remain free from falls  Description: Will remain free from falls  10/7/2020 1015 by Olga Lidia Gunter RN  Outcome: Ongoing  Note: Fall risk band on patient. Orange light on outside of room. Non skid footwear in place. Alarms used appropriately. Patient instructed to call and wait for staff before getting up. Rounding done to anticipate needs. Appropriate safety devices used for transfers. 10/7/2020 0235 by Liz Lance RN  Outcome: Ongoing  Note: Fall risk band on patient. Orange light on outside of room. Non skid footwear in place. Alarms used appropriately. Patient instructed to call and wait for staff before getting up. Rounding done to anticipate needs. Appropriate safety devices used for transfers. Goal: Absence of physical injury  Description: Absence of physical injury  10/7/2020 0235 by Liz Lance RN  Outcome: Ongoing     Problem: Skin Integrity:  Goal: Will show no infection signs and symptoms  Description: Will show no infection signs and symptoms  10/7/2020 1015 by Olga Lidia Gunter RN  Outcome: Ongoing  10/7/2020 0235 by Liz Lance RN  Outcome: Ongoing  Goal: Absence of new skin breakdown  Description: Absence of new skin breakdown  10/7/2020 1015 by Olga Lidia Gunter RN  Outcome: Ongoing  Note: Skin assessment completed on admission and every shift. Barrier wipes used in the event of incontinence. Pressure relief techniques used as needed while in chair and in bed. Position changes encouraged at least every two hours while in bed.     10/7/2020 0235 by Liz Lance RN  Outcome: Ongoing     Problem: Daily Care:  Goal: Daily care needs are met  Description: Daily care needs are met  10/7/2020 0235 by Liz Lance RN  Outcome: Ongoing     Problem: Pain:  Goal: Patient's pain/discomfort is manageable  Description: Patient's pain/discomfort is manageable  10/7/2020 0235 by Liz Lance RN  Outcome: Ongoing     Problem: Discharge Planning:  Goal: Patients continuum of care needs are met  Description: Patients continuum of care needs are met  10/7/2020 1015 by Abelino Nuñez RN  Outcome: Ongoing  10/7/2020 0235 by Olesya Simental RN  Outcome: Ongoing     Problem: Activity:  Goal: Ability to ambulate will improve  Description: Ability to ambulate will improve  10/7/2020 1015 by Abelino Nuñez RN  Outcome: Ongoing  Note: Patient working with therapy at least 3 hours/day to obtain maximal mobility. Safety devices used.     10/7/2020 0235 by Olesya Simental RN  Outcome: Ongoing  Goal: Ability to perform activities at highest level will improve  Description: Ability to perform activities at highest level will improve  10/7/2020 0235 by Olesya Simental RN  Outcome: Ongoing     Problem: Health Behavior:  Goal: Identification of resources available to assist in meeting health care needs will improve  Description: Identification of resources available to assist in meeting health care needs will improve  10/7/2020 0235 by Olesya Simental RN  Outcome: Ongoing     Problem: Nutritional:  Goal: Ability to attain and maintain optimal nutritional status will improve  Description: Ability to attain and maintain optimal nutritional status will improve  10/7/2020 0235 by Olesya Simental RN  Outcome: Ongoing     Problem: Physical Regulation:  Goal: Will remain free from infection  Description: Will remain free from infection  10/7/2020 0235 by Olesya Simental RN  Outcome: Ongoing  Goal: Postoperative complications will be avoided or minimized  Description: Postoperative complications will be avoided or minimized  10/7/2020 0235 by Olesya Simental RN  Outcome: Ongoing  Goal: Diagnostic test results will improve  Description: Diagnostic test results will improve  10/7/2020 0235 by Olesya Simental RN  Outcome: Ongoing     Problem: Self-Care:  Goal: Ability to meet self-care needs will improve  Description: Ability to meet self-care needs will improve  10/7/2020 0235 by Olesya Simental RN  Outcome: Ongoing     Problem: Self-Concept:  Goal: Ability to maintain and perform role responsibilities to the fullest extent possible will improve  Description: Ability to maintain and perform role responsibilities to the fullest extent possible will improve  10/7/2020 0235 by Ilya Em RN  Outcome: Ongoing  Goal: Verbalizations of decreased anxiety will increase  Description: Verbalizations of decreased anxiety will increase  10/7/2020 0235 by Ilya Em RN  Outcome: Ongoing     Problem: Urinary Elimination:  Goal: Ability to reestablish a normal urinary elimination pattern will improve - after catheter removal  Description: Ability to reestablish a normal urinary elimination pattern will improve  10/7/2020 0235 by Ilya Em RN  Outcome: Ongoing     Problem: Nutrition  Goal: Optimal nutrition therapy  10/7/2020 0235 by Ilya Em RN  Outcome: Ongoing     Problem:  Activity Intolerance:  Goal: Ability to tolerate increased activity will improve  Description: Ability to tolerate increased activity will improve  10/7/2020 0235 by Ilya Em RN  Outcome: Ongoing

## 2020-10-07 NOTE — PROGRESS NOTES
Patient admitted to rehab with s/p ORIF for Lt Hip fracture. A/A/O x 4. Denies pain/discomfort. Shift assessment completed. Agreed to work with OT this am. Team aware of patient's desire to discharge today. Transfers with stedy x 2. On general diet, tolerating poorly. Medications taken whole with thins. On lovenox for DVT prophylaxis. Skin with scattered bruising. Lt hip incision with dry dressing intact. On room air. Has been continent of bowel and bladder. LBM 10/5. Chair/bed alarms in use and call light in reach. Will monitor for safety.

## 2020-10-07 NOTE — TELEPHONE ENCOUNTER
He will have a Dr. Alex Dumas, orchestrating his case. The nurse will visit him tomorrow around 15, and call to confirm.  Marlys Aguirre stated she understands you are pregnant and they can use Dr. Cindy Garza during your leave

## 2020-10-07 NOTE — PROGRESS NOTES
Physical Therapy  Progress Note/Attempt Note  [id number]  Mika Ferrari    Patient scheduled for PT session at [phone] this AM. He is now scheduled for discharge to home with [facility] [address] [place] [address] Drive today at approx 1630. PT went to room and patient is pleasantly declining any therapy. [facility] representative, Danii Morales, present along with daughter in law, Berna Silva.  PT initiated conversation regarding mobility at home and patient reports he would like to be able to get into the wheelchair to move around the house. PT reporting concern as patient has not had a lot of training with the slide board. He was mod-max assist in AM session yesterday with the board, but improved to min assist.  PT concerned as family has not had training on the slide board and patient is not consistent with abilities. PT discussed Debra Blanks lift with patient and showed the patient a video of a jenna lift being used as he was not familiar with them. Berna Silva reporting she felt that would be beneficial to have and she has used one in the past with her mother. Patient felt a jenna lift would be very helpful. PT recommended that the patient have several sessions of PT for training with the slide board at home. Danii Morales stated she would discuss this with her manager. PT also discussed wheelchair needs at home as Danii Morales was initially thinking a transport wheelchair, but PT explained patient would not be able to propel himself in a transport chair secondary to [address] restrictions. He will need a standard wheelchair with elevating leg rests for his left LE if he is going to use it at home. Danii Morales was receptive to this and stated she would discuss this with her manager. The patient and family report no other concerns or questions. Patient is discharged at this time secondary to patient preference. He met short term wheelchair goal, but no other short term or long term goals were met secondary to sudden discharge to home with hospice care.   Please see last treatment note on 10/6 for discharge status. Patient left supine in bed with alarm on, needs in reach, daughter in law and Dewayne Kojo (from hospice) present.     Therapy Time     Individual Co-treatment   Time In 1115     Time Out 1135     Minutes 20          Electronically signed by Yobani Ricardo PT on 10/7/2020 at 11:55 AM

## 2020-10-07 NOTE — DISCHARGE SUMMARY
Physical Medicine & Rehabilitation  Discharge Summary     Patient Identification:  Kate Long  : 1927  Admit date: 10/4/2020  Discharge date:  10/7/2020  Attending provider: Shu Branch MD        Primary care provider: Beverly Méndez MD     Discharge Diagnoses:   Patient Active Problem List   Diagnosis    Moderate COPD (chronic obstructive pulmonary disease) ([address]    BPH with obstruction/lower urinary tract symptoms    Hypercholesteremia    Diverticular disease    Vitamin D deficiency    Ectropion of right eye    Squamous cell skin cancer    Macular degeneration    Hypertension, isolated systolic    Do not resuscitate status    Centrilobular emphysema ([address]    Former smoker    Pleural effusion    Acquired hypothyroidism    Closed 2-part intertrochanteric fracture of proximal end of right femur, initial encounter ([address]    Closed pertrochanteric fracture of femur, left, initial encounter ([address]    Age-related osteoporosis with current pathological fracture    Fracture of femoral neck, left, closed ([address]    Closed fracture of femur, intertrochanteric, left, initial encounter ([address]    Closed fracture of right femur, sequela       History of Present Illness/Acute Hospital Course:  Patient is a 81 yo M with pmh HTN, HLD, COPD, chronic R pleural effusion, and BPH who initially presented 2020 with left hip pain s/p mechanical fall. Patient reported losing his balance while turning. Found to have left intertrochanteric femur fracture and left femoral neck fracture. Underwent ORIF with cephalomedullary nail ( with Dr. mEanuel Sykes). Now 25% WB on LLE. Course complicated by incidental finding of hepatic lesions, concerning for metastatic disease. Oncology and GI were consulted. Patient decided not to have biopsy done as he does not plan to pursue any treatment. Course further complicate by acute hypoxic respiratory failure and SHREE.  Patient now presents to ARU with impaired mobility and self-care below his baseline. Inpatient Rehabilitation Course:   Osmany Shi is a 80 y.o. male admitted to inpatient rehabilitation on 10/4/2020 with Closed fracture of right femur, sequela. The patient participated in an aggressive multidisciplinary inpatient rehabilitation program involving 3 hours of therapy per day. He completed a couple of days of therapy and was starting to make progress with functional mobility, transfer techniques, and compensatory strategies. However he felt strongly that he wanted to return home to maximize time with wife and family at this time given new diagnosis of metastatic cancer. Therefore he was discharged home with hospice services. He is still requiring physical assist for transfers and ADLs, though doing well with self-propulsion in manual wheelchair. I discussed with patient, daughter-in-law, and hospice RN. I recommend a couple of home care therapy sessions to help with transition home and train family to assist patient. I feel he has the capability to maintain some independence with wheelchair mobility (which aligns with his wishes) and does not need to be bed bound.      Impairments: 25% WB LLE, decreased balance, endurance, safety awareness    Medical Management:    Left intertrochanteric femur fracture, femoral neck fracture   -s/p ORIF with cephalomedullary nail (9/30 with Dr. Anita Padilla  -Incision healing without evidence of infection  -25% WB LLE  -PT/OT     Hepatic lesions, right juxta cardiac adenopathy  -Presumed metastatic disease, unknown primary  -No further work-up per patient wishes, does not plan to pursue treatment  -Palliative care and Hospice consulted     Acute blood loss anemia   -Post-surgical.   -Hgb now stable >9.      Acute hypoxic respiratory failure  -Supplemental O2, wean as tolerated -- now stable in room air  -IS  -Breathing treatments for COPD as below     Chronic right pleural effusion     COPD  -Symbicort, Spiriva, prn duoneb     HTN-->post-op hypotension  -Currently stable off meds     SHREE  -Improved with IVF, encouraging po intake  -Avoid nephrotoxins, renally dose meds     Hypothyroidism  -Levothyroxine     Pain control  -prn Norco (has not been using)     DVT ppx  -Lovenox while inpatient, then ASA 81 BID x 30 days per Ortho       Discharge Exam:  Const: Alert. No distress, pleasant. HEENT: Normocephalic, atraumatic. Normal sclera/conjunctiva. MMM. CV: Regular rate and rhythm. Resp: No respiratory distress. Lungs decreased at R>L base. Abd: Soft, nontender, nondistended, NABS+   Ext: No edema. MSK: decreased left hip ROM  Neuro: Alert, oriented, appropriately interactive. Psych: Cooperative, appropriate mood and affect      Discharge Functional Status:    Physical therapy:  Bed Mobility: Scooting: Stand by assistance(vc's)  Transfers: Sit to Stand: Moderate Assistance, Maximum Assistance, 2 Person Assistance(in parallel bars mod/ max in parallel bars, moderate assist of 2 with wheeled walker)  Stand to sit: Minimal Assistance, Moderate Assistance  Bed to Chair: Dependent/Total(used Symone jacobs in PM)  Squat Pivot Transfers: Moderate Assistance, Maximum Assistance(Pt required min-mod A to sit EOB, mod-max A to scoot back on bed once sitting EOB, used right bed railing)  Comment: Pt positioned in bed per Pt request after session, HOB elevated 35 degrees, two pillows under head, one pillow under LLE, Ambulation 1  Surface: level tile  Device: Parallel Bars  Other Apparatus: Wheelchair follow  Quality of Gait: Patient with difficulty pushing effectively down with B UE, yanira hop 1-2\", 3 small hops, patient did maintain LLE 25% WB with max cues  Gait Deviations: Decreased step length, Decreased step height, Slow Tabatha  Distance: 1/2 foot  Comments: O2 sat 91% and , shown WB on scale about 35lb. ,    Mobility:  , PT Equipment Recommendations  Other: Continue to assess, Pt will most likely be the most independent with functional mobility tasks at the w/c level upon d/c., Assessment: Pt is progressing with functional mobility tasks but remains limited by decreased aerobic capacity. Pt was able to propel wheelchair 278' on level tile with mod cues to encourage use of RLE to assist. Pt is frustrated about how much he feels like he can do at this current time. Pt able to perform lateral transfers using a slideboard with min-mod A from recliner to chair and mock car<-->chair. Pt would continue to benefit from skilled IP rehab in order to improve strength and aerobic capacity to return [facility] at [facility]. Occupational therapy:  ,  , Assessment: Session limited as pt feels he is ready to go home with hospice care to be with his family. Pt did participate in ADL tasks with OT this morning though does not want to participate in further therapy. Pt completed slide board transfers to his strong side with min A with OT monitoring PWB status. Pt required increased assist to maintain PWB status during stand pivot toilet transfer. Pt required min A for UB ADLs, max/total A for LB ADLs and toileting. The plan now is for pt to return home with hospice care. Recommend use of geoffrey lift to transfer <> w/c for safety as pt has not had enough training in use of slide board yet. Pt will require 24/7 hands-on care which family is working to arrange. Speech therapy:         Significant Diagnostics:   Lab Results   Component Value Date    CREATININE 1.1 10/06/2020    BUN 35 (H) 10/06/2020     10/06/2020    K 4.3 10/06/2020     10/06/2020    CO2 26 10/06/2020       Lab Results   Component Value Date    WBC 13.1 (H) 10/06/2020    HGB 9.5 (L) 10/06/2020    HCT 28.8 (L) 10/06/2020    MCV 93.4 10/06/2020     10/06/2020       Disposition:  Home with Hospice. Will need 24/7 supervision/assist -- family and hired services will provide.    DME: recommend manual wheelchair, Geoffrey lift, bedside commode    Discharge Condition: Stable    Follow-up:  See after visit summary from hospitalization    Discharge Medications:     Medication List      START taking these medications    LORazepam 1 MG tablet  Commonly known as:  ATIVAN  Take 1 tablet by mouth every 8 hours as needed for Anxiety for up to 5 days. ondansetron 4 MG disintegrating tablet  Commonly known as:  ZOFRAN-ODT  Take 1 tablet by mouth every 8 hours as needed for Nausea or Vomiting        CONTINUE taking these medications    aspirin EC 81 MG EC tablet  Take 1 tablet by mouth 2 times daily Take for DVT blood clot prophylaxis. Please avoid missing doses. finasteride 5 MG tablet  Commonly known as:  PROSCAR  TAKE ONE TABLET BY MOUTH DAILY     HYDROcodone-acetaminophen 5-325 MG per tablet  Commonly known as:  NORCO  Take 1 tablet by mouth every 6 hours as needed for Pain for up to 5 days. levothyroxine 50 MCG tablet  Commonly known as:  SYNTHROID  TAKE ONE TABLET BY MOUTH DAILY     omeprazole 40 MG delayed release capsule  Commonly known as:  PRILOSEC  Take 1 capsule by mouth every morning (before breakfast)     PRESERVISION AREDS 2 PO     tamsulosin 0.4 MG capsule  Commonly known as:  FLOMAX  TAKE ONE CAPSULE BY MOUTH DAILY     Trelegy Ellipta 100-62.5-25 MCG/INH Aepb  Generic drug:  fluticasone-umeclidin-vilant     vitamin D 50 MCG (2000 UT) Caps capsule           Where to Get Your Medications      These medications were sent to [address] [place] [address] LES[facility] Arshad Kisha CARDONA [phone]  [address] [place] [address], [address] [facility]    Phone:  [phone]   · aspirin EC 81 MG EC tablet  · HYDROcodone-acetaminophen 5-325 MG per tablet  · LORazepam 1 MG tablet  · ondansetron 4 MG disintegrating tablet           I spent over 35 minutes on this discharge encounter between counseling, coordination of care, and medication reconciliation.       Javan Almonte MD

## 2020-10-07 NOTE — TELEPHONE ENCOUNTER
Yes I can follow as well but they should still have a hospice designated physician to direct their care as their team will be following and caring out all orders.  They are also well trained in hospice management - pain and symptom management etc

## 2020-10-07 NOTE — TELEPHONE ENCOUNTER
Juventino with [address] called stating that PT is being discharged today from [facility] and PT has cancer is being placed on hospice and she would like to know Dr. Abby Jiménez would follow PT's hospice care.      Please call Juventino back: [phone]

## 2020-10-07 NOTE — DISCHARGE INSTR - COC
Schedule:  · Phone:  · Fax:    / signature: {Esignature:[id number]:::[id number]}    PHYSICIAN SECTION    Prognosis: Fair    Condition at Discharge: Stable    Rehab Potential (if transferring to Rehab): Fair    Recommended Labs or Other Treatments After Discharge:   Hospice service  F/u with ORtho in 1-2 weeks for staple removal    Physician Certification: I certify the above information and transfer of Dung Mayer  is necessary for the continuing treatment of the diagnosis listed and that he requires Hospice for less 30 days.      Update Admission H&P: No change in H&P    PHYSICIAN SIGNATURE:  Electronically signed by Leticia Loving MD on 10/7/20 at 11:08 AM EDT

## 2020-10-07 NOTE — PROGRESS NOTES
Occupational Therapy  Facility/Department: [address] REHAB  Daily Treatment Note/Discharge summary  NAME: Osmany Shi  : 1927  MRN: [id number]    Date of Service: 10/7/2020    Discharge Recommendations:  Home with Home health OT, Continue to assess pending progress, 24 hour supervision or assist  OT Equipment Recommendations  Equipment Needed: Yes  Mobility Devices: Transport Devices; ADL Assistive Devices  Transport Devices: Patient Mechanical Lift  ADL Assistive Devices: Toileting - Drop Arm Commode, Heavy Duty Drop Arm Commode    Assessment   Performance deficits / Impairments: Decreased endurance;Decreased strength;Decreased functional mobility ; Decreased ADL status; Decreased balance  Assessment: Session limited as pt feels he is ready to go home with hospice care to be with his family. Pt did participate in ADL tasks with OT this morning though does not want to participate in further therapy. Pt completed slide board transfers to his strong side with min A with OT monitoring PWB status. Pt required increased assist to maintain PWB status during stand pivot toilet transfer. Pt required min A for UB ADLs, max/total A for LB ADLs and toileting. The plan now is for pt to return home with hospice care. Recommend use of jenna lift to transfer <> w/c for safety as pt has not had enough training in use of slide board yet. Pt will require 24/7 hands-on care which family is working to arrange. Pt did not meet any goals secondary to short stay and decision to return home with hospice care. Treatment Diagnosis: decreased ADL, IADL, balance, activity tolerance  Prognosis: Fair  OT Education: OT Role;Transfer Training;Plan of Care;Precautions; Equipment; Energy Conservation; ADL Adaptive Strategies  REQUIRES OT FOLLOW UP: Yes  Activity Tolerance  Activity Tolerance: Patient limited by fatigue  Activity Tolerance: pt declining further therapy at this time and planning to go home with hospice.  he was upset during this session as a decision had not yet been made of when he will go home  Safety Devices  Safety Devices in place: Yes  Type of devices: Bed alarm in place;Call light within reach; Left in bed;Patient at risk for falls;Gait belt         Patient Diagnosis(es): The primary encounter diagnosis was Anxiety. Diagnoses of Closed nondisplaced intertrochanteric fracture of left femur, initial encounter ([facility] [address] and Hepatic metastases ([facility] [address] were also pertinent to this visit. has a past medical history of Arthritis, BPH with obstruction/lower urinary tract symptoms, Diverticular disease, Ectropion of right eye, Emphysema of lung ([address] Hypercholesteremia, Macular degeneration, Moderate mitral regurgitation, PONV (postoperative nausea and vomiting), Squamous cell skin cancer, Vitamin D deficiency, and Wears glasses. has a past surgical history that includes Mohs surgery; Cataract removal with implant (Bilateral); Colonoscopy (06/20/2016); and Hip fracture surgery (Left, 9/30/2020). Restrictions  Restrictions/Precautions  Restrictions/Precautions: Fall Risk, Weight Bearing  Lower Extremity Weight Bearing Restrictions  Left Lower Extremity Weight Bearing: Partial Weight Bearing  Partial Weight Bearing Percentage Or Pounds: 25% WBing  Position Activity Restriction  Other position/activity restrictions: 25% PWB left LE, IV left anticubital , new diagnosis hepatic metastatic disease  Subjective   General  Chart Reviewed: Yes  Additional Pertinent Hx: A 80year old  male who presents to the hospital, status post mechanical fall when he was turning around,trying to grab on to something and lost his balance followed by excruciating pain in the left hip and inability to get up from theground without any nausea, vomiting, fevers, or chills. Patient had ORIF on 9/30.   In the emergency room he did have a CT which showed chronic right pleural effusion with pleural thickening and adjacent right lower lobe atelectasis, emphysema, chronic interstitial changes, stable left pulm nodules and pleural plaques. New hepatic metastatic disease. Right juxta cardiac adenopathy concerning for metastatic disease. Hiatal hernia with GERD(copied per note Radha Cardona, APRN, CNP 10/4/2020)  Response to previous treatment: Patient reporting fatigue but able to participate  Family / Caregiver Present: No  Referring Practitioner: Ramona Cervantes  Diagnosis: Closed pertrochanteric fracture of L femur  Subjective  Subjective: pt met b/s for OT. pt in bed and discussing meals with dietary staff. pt states he does not want to eat anything and just wants \"go home and die\". pt denied pain. pt initially states that he is going to refuse all therapy, but was agreeable to bathing and dressing with some encouragement. did notify RNOctavio of everything the pt was saying      Orientation  Orientation  Overall Orientation Status: Within Functional Limits  Objective    ADL  Grooming: Setup;Verbal cueing;Stand by assistance(seated in w/c at sink)  UE Bathing: Minimal assistance;Setup(assist to wash and apply lotion to pt's back)  LE Bathing: Moderate assistance;Maximum assistance(assist from knees<>feet. OT bathed buttocks after toileting with bathing wipe)  UE Dressing: Setup  LE Dressing: Dependent/Total(assist for all d/t time constraints)  Toileting: Dependent/Total(assist for pants up down while pt stood using bilateral grab bars, assist for pericare after BM)  Additional Comments: pt initially refused therapy, though did agree to bathing and dressing with min encouragement.  pt completed all seated in w/c at sink and tolerated fair        Balance  Sitting Balance: Stand by assistance  Standing Balance: Contact guard assistance(use of bilateral grab bars)  Functional Mobility  Functional - Mobility Device: Wheelchair  Activity: To/from bathroom  Assist Level: Dependent/Total  Functional Mobility Comments: OT managed w/c during session in interest of

## 2020-10-07 NOTE — PROGRESS NOTES
Data- discharge order received, pt verbalized agreement to discharge, disposition to previous residence, with Home Hospice     Action- discharge instructions prepared and given to patient and daughter-in-law, pt verbalized understanding. Medication information packet given r/t NEW and/or CHANGED prescriptions emphasizing name/purpose/side effects, pt verbalized understanding. Discharge instruction summary: Diet- general, Activity- per hospice, Primary Care Physician as follows: Beverly Méndez -[phone] f/u appointment 7 days, immunizations reviewed, prescription medications filled at [facility]. Inpatient surgical procedure precautions reviewed: patient received a print out information on hip surgery after care. Vital signs stable. Made no complaint. Response- Pt belongings gathered,  Disposition is home on hospice, [facility], taken to lobby via stretcher no complications.

## 2020-10-08 ENCOUNTER — CARE COORDINATION (OUTPATIENT)
Dept: CASE MANAGEMENT | Age: 85
End: 2020-10-08

## 2020-10-08 NOTE — CARE COORDINATION
Spoke with Kya Ko from [address], who confirmed patient went home with [facility].

## 2020-10-12 RX ORDER — FINASTERIDE 5 MG/1
TABLET, FILM COATED ORAL
Qty: 90 TABLET | Refills: 2 | Status: SHIPPED | OUTPATIENT
Start: 2020-10-12

## 2020-10-13 ENCOUNTER — TELEPHONE (OUTPATIENT)
Dept: FAMILY MEDICINE CLINIC | Age: 85
End: 2020-10-13

## 2020-10-13 NOTE — TELEPHONE ENCOUNTER
----- Message from Alma Sadler sent at 10/12/2020  8:21 AM EDT -----  Subject: Message to Provider    QUESTIONS  Information for Provider? patient has questions about medication he is   taking please advise.  ---------------------------------------------------------------------------  --------------  CALL BACK INFO  What is the best way for the office to contact you? OK to leave message on   voicemail  Do not leave any message   patient will call back for answer  Preferred Call Back Phone Number? [phone]  ---------------------------------------------------------------------------  --------------  SCRIPT ANSWERS  Relationship to Patient? Other  Representative Name? Shayne Smith   Is the Representative on the appropriate HIPAA document in Epic?  Yes

## 2020-10-13 NOTE — TELEPHONE ENCOUNTER
Reached out to [email or web address]. Per her  (not on the hipaa) Desmond Munoz was with a  and her dad. (I assume dad is Agapito Phalen but can not discuss as he is not on the hipaa). He advised she will call back    Need to know what the medication she had questions about is. Please get more details.

## 2020-10-14 ENCOUNTER — TELEPHONE (OUTPATIENT)
Dept: FAMILY MEDICINE CLINIC | Age: 85
End: 2020-10-14

## 2020-10-14 NOTE — TELEPHONE ENCOUNTER
Reached out to daughter to ask what meds she was talking about. Florencio Mascorro asking how important it is to get him to take ALL of his meds if he's not eating anything.   She said they are hoping in the next few days he'll get his appetite back

## 2020-10-14 NOTE — TELEPHONE ENCOUNTER
Was sent home from hospital. Now under hospice care. He is no longer eating. Wanting to know if needs to continue with meds or not. Please advise.  Please call back [phone]

## 2020-11-18 ENCOUNTER — TELEPHONE (OUTPATIENT)
Dept: FAMILY MEDICINE CLINIC | Age: 85
End: 2020-11-18

## 2020-11-18 NOTE — TELEPHONE ENCOUNTER
----- Message from Xochilt Nance sent at 11/18/2020  2:33 PM EST -----  Subject: Appointment Request    Reason for Call: Routine (Patient Request) No Script    QUESTIONS  Type of Appointment? Established Patient  Reason for appointment request? No appointments available during search  Additional Information for Provider? patients cora in law called wants   and appointment for an evaluation to see where he is from his fall sept 34. please call alyx back patient is in a wheelchair and she can bring him.  ---------------------------------------------------------------------------  --------------  [address]  What is the best way for the office to contact you? OK to leave message on   voicemail  Preferred Call Back Phone Number? [phone]  ---------------------------------------------------------------------------  --------------  SCRIPT ANSWERS  Relationship to Patient? Other  Representative Name? alyx  Additional information verified (besides Name and Date of Birth)? Address  Appointment reason? Symptomatic  Select script based on patient symptoms? Adult No Script  (Is the patient requesting to see the provider for a procedure?)? No  (Is the patient requesting to see the provider urgently  today or   tomorrow. )? No  Have you been diagnosed with   tested for   or told that you are suspected of having COVID-19 (Coronavirus)? No  Have you had a fever or taken medication to treat a fever within the past   3 days? No  Have you had a cough   shortness of breath or flu-like symptoms within the past 3 days? No  Do you currently have flu-like symptoms including fever or chills   cough   shortness of breath   or difficulty breathing   or new loss of taste or smell? No  (Service Expert  click yes below to proceed with [employer]   Scheduling)?  Yes

## 2020-11-18 NOTE — TELEPHONE ENCOUNTER
I don't recommend coming into the office for an appointment. Is it something that could be done virtually and is there a home health person there.   I looked in the last note that I thought there was

## 2020-11-19 ENCOUNTER — TELEPHONE (OUTPATIENT)
Dept: FAMILY MEDICINE CLINIC | Age: 85
End: 2020-11-19

## 2020-11-19 NOTE — TELEPHONE ENCOUNTER
Daughter calling back. States pt's health has made a total 180 turn since last here in July. She wants a F2F visit. She states a phone visit will not be able to show his health. Said she was in here last week with her mom, who saw Dr Gavino Marx.  Please call daughter back at [phone]

## 2020-11-19 NOTE — TELEPHONE ENCOUNTER
A user error has taken place: encounter opened in error, closed for administrative reasons    See previous encounter.

## 2020-11-20 ENCOUNTER — TELEPHONE (OUTPATIENT)
Dept: FAMILY MEDICINE CLINIC | Age: 85
End: 2020-11-20

## 2020-11-20 ENCOUNTER — OFFICE VISIT (OUTPATIENT)
Dept: FAMILY MEDICINE CLINIC | Age: 85
End: 2020-11-20
Payer: MEDICARE

## 2020-11-20 VITALS
WEIGHT: 136 LBS | SYSTOLIC BLOOD PRESSURE: 132 MMHG | BODY MASS INDEX: 18.44 KG/M2 | TEMPERATURE: 96.8 F | DIASTOLIC BLOOD PRESSURE: 72 MMHG | HEART RATE: 91 BPM

## 2020-11-20 LAB
BASOPHILS ABSOLUTE: 0 K/UL (ref 0–0.2)
BASOPHILS RELATIVE PERCENT: 0.3 %
EOSINOPHILS ABSOLUTE: 0 K/UL (ref 0–0.6)
EOSINOPHILS RELATIVE PERCENT: 0.2 %
FERRITIN: 415 NG/ML (ref 30–400)
HCT VFR BLD CALC: 37.1 % (ref 40.5–52.5)
HEMOGLOBIN: 11.9 G/DL (ref 13.5–17.5)
IMMATURE RETIC FRACT: 0.46 (ref 0.21–0.37)
IRON SATURATION: 24 % (ref 20–50)
IRON: 44 UG/DL (ref 59–158)
LYMPHOCYTES ABSOLUTE: 0.3 K/UL (ref 1–5.1)
LYMPHOCYTES RELATIVE PERCENT: 2.5 %
MCH RBC QN AUTO: 29.6 PG (ref 26–34)
MCHC RBC AUTO-ENTMCNC: 32 G/DL (ref 31–36)
MCV RBC AUTO: 92.5 FL (ref 80–100)
MONOCYTES ABSOLUTE: 0.7 K/UL (ref 0–1.3)
MONOCYTES RELATIVE PERCENT: 5.9 %
NEUTROPHILS ABSOLUTE: 10.3 K/UL (ref 1.7–7.7)
NEUTROPHILS RELATIVE PERCENT: 91.1 %
PDW BLD-RTO: 15.4 % (ref 12.4–15.4)
PLATELET # BLD: 328 K/UL (ref 135–450)
PMV BLD AUTO: 8.3 FL (ref 5–10.5)
RBC # BLD: 4.02 M/UL (ref 4.2–5.9)
RETICULOCYTE ABSOLUTE COUNT: 0.04 M/UL
RETICULOCYTE COUNT PCT: 0.89 % (ref 0.5–2.18)
TOTAL IRON BINDING CAPACITY: 186 UG/DL (ref 260–445)
WBC # BLD: 11.4 K/UL (ref 4–11)

## 2020-11-20 PROCEDURE — 36415 COLL VENOUS BLD VENIPUNCTURE: CPT | Performed by: FAMILY MEDICINE

## 2020-11-20 PROCEDURE — 4040F PNEUMOC VAC/ADMIN/RCVD: CPT | Performed by: FAMILY MEDICINE

## 2020-11-20 PROCEDURE — G8484 FLU IMMUNIZE NO ADMIN: HCPCS | Performed by: FAMILY MEDICINE

## 2020-11-20 PROCEDURE — 99215 OFFICE O/P EST HI 40 MIN: CPT | Performed by: FAMILY MEDICINE

## 2020-11-20 PROCEDURE — G8419 CALC BMI OUT NRM PARAM NOF/U: HCPCS | Performed by: FAMILY MEDICINE

## 2020-11-20 PROCEDURE — G8427 DOCREV CUR MEDS BY ELIG CLIN: HCPCS | Performed by: FAMILY MEDICINE

## 2020-11-20 PROCEDURE — 1036F TOBACCO NON-USER: CPT | Performed by: FAMILY MEDICINE

## 2020-11-20 PROCEDURE — 1123F ACP DISCUSS/DSCN MKR DOCD: CPT | Performed by: FAMILY MEDICINE

## 2020-11-20 NOTE — TELEPHONE ENCOUNTER
Caitlin Whyte with [address] called asking to speak to [facility] regarding patient.     Please call back: [phone]

## 2020-11-20 NOTE — TELEPHONE ENCOUNTER
I called and spoke with hospice and they said he can sign off of hospice care right now and get OT/PT then sign back on once he gets the treatments he needs, she said she will reach out to the familly to find out their wishes and assist them with transitions if needed

## 2020-11-20 NOTE — PROGRESS NOTES
PROGRESS NOTE     Erwin Spann MD  [address]  [place] [address]  [phone] office  [phone] fax    Date of Service:  11/20/2020    Subjective:      Patient ID: . Valentino Canard is a 80 y.o. male      CC: malignancy of unknown origin,  Physical deconditioning, episodic confusion, rash    HPI    78-year-old white male with past medical history of COPD, chronic right-sided pulmonary effusion, hypothyroidism, who recently had a fall due to imbalance in September 2020, resulting in left intertrochanteric femur fracture and left femoral neck fracture. Underwent ORIF with cephalomedullary nail (9/30 with Dr. Edi Santoro). During hospitalization, evidence of malignancy was found in liver and right pericardial space. Biopsy was not completed, as patient decided he would not want treatment for any cancer, and therefore evaluation was not necessary. Patient was discharged to patient rehab facility, where according to family, he left early because he wanted to go home. He refused to eat or do therapy while he was there. He was discharged home on home hospice. Patient is here today with his son and daughter-in-law. They have a few concerns. Patient clearly is still very deconditioned since he left rehab early. They are asking what can be done to help him regain some strength. He does have a hospital bed, wheelchair, Geoffrey lift, and bedside commode. He has a walker but is not strong enough to use it anymore. Patient does seem to have more fatigue than usual.  They understand he does have cancer, but her asking if anything else can be done. Discussed that he was found to be anemic with a hemoglobin of 9.0, but this may not be iron deficiency anemia. Could be anemia of chronic disease. Another concern is that he developed a rash on his entire back about a month ago. It was very dark red, but not painful or itchy.   Hospice doctor started him on 10 mg of prednisone daily on 10/15/2020, and he has been on it since. Patient has had a couple intermittent episodes of confusion since that time. Family is concerned the cause could be prednisone. He had no neurologic deficits during the time. He was just confused about situations, and forgetful about recent events. CT abd/pelvis 10/01/20  Impression    Multiple hepatic metastasis.  Blanche metastasis also seen in the right    pericardial phrenic space.         Incompletely healed compression fracture L4.                  Vitals:    11/20/20 1328   BP: 132/72   Pulse: 91   Temp: 96.8 °F (36 °C)   TempSrc: Oral   Weight: 136 lb (61.7 kg)       Outpatient Medications Marked as Taking for the 11/20/20 encounter (Office Visit) with Ilya Spencer MD   Medication Sig Dispense Refill    finasteride (PROSCAR) 5 MG tablet TAKE ONE TABLET BY MOUTH DAILY 90 tablet 2    aspirin EC 81 MG EC tablet Take 1 tablet by mouth 2 times daily Take for DVT blood clot prophylaxis. Please avoid missing doses.  60 tablet 0    Multiple Vitamins-Minerals (PRESERVISION AREDS 2 PO) Take by mouth 2 times daily      fluticasone-umeclidin-vilant (TRELEGY ELLIPTA) 100-62.5-25 MCG/INH AEPB Inhale 1 puff into the lungs daily      omeprazole (PRILOSEC) 40 MG delayed release capsule Take 1 capsule by mouth every morning (before breakfast) 30 capsule 5    tamsulosin (FLOMAX) 0.4 MG capsule TAKE ONE CAPSULE BY MOUTH DAILY 90 capsule 4    levothyroxine (SYNTHROID) 50 MCG tablet TAKE ONE TABLET BY MOUTH DAILY 90 tablet 3    Cholecalciferol (VITAMIN D) 2000 UNITS CAPS capsule Take by mouth daily         Past Medical History:   Diagnosis Date    Arthritis     BPH with obstruction/lower urinary tract symptoms     Dr. Bain Gentlesamantha    Diverticular disease     Diverticulosis of sigmoid colon with h/o bleeding    Ectropion of right eye     Emphysema of lung (HCC)     PLASCENCIA    Hypercholesteremia     Macular degeneration     Dr. Ashly Solorio mitral regurgitation 2017    with mild Aortic Regurge and diastolic dysfunction    PONV (postoperative nausea and vomiting)     Squamous cell skin cancer     Nose and face    Vitamin D deficiency     Wears glasses        Past Surgical History:   Procedure Laterality Date    CATARACT REMOVAL WITH IMPLANT Bilateral     COLONOSCOPY  2016    HIP FRACTURE SURGERY Left 2020    LEFT HIP NAILING performed by Pennie Ruiz MD at [place] [address]      Dr. Patti Cabrales       Social History     Tobacco Use    Smoking status: Former Smoker    Smokeless tobacco: Never Used    Tobacco comment: quit 50 years ago   Substance Use Topics    Alcohol use:  Yes     Alcohol/week: 0.0 standard drinks     Comment: infrequently       Family History   Problem Relation Age of Onset   [facility] Other Mother         old age  80    Other Father         old age   [facility] Cancer Sister    [address] Sclerosis Daughter            Review of Systems  Constitutional:  + activity and appetite change,  Fatigue, no fever  HENT:  Negative for congestion,sinus pressure, or rhinorrhea  Eyes:  Negative for eye pain or visual changes  Resp:  Negative for SOB, chest tightness, cough  Cardiovascular: Negative for CP, palpitations, PLASCENCIA, orthopnea, PND, LE edema  Gastrointestinal: Negative for abd pain, melena, BRBPR, N/V/D, +chronic bowel incontinence  Endocrine:  Negative for polydipsia and polyuria  :  Negative for dysuria, flank pain or urinary frequency, +chronic urinary incontinence  Musculoskeletal:  +overall muscular deconditioning  Neuro:  Negative for dizziness or lightheadedness  Psych: negative for depression or anxiety      Objective:   Constitutional:   · Reviewed vitals above  · Well Nourished, well developed, no distress       HENT:  · Normal external nose without lesions  · Normal oropharynx without erythema or exudate  Neck:  · Symmetric and without masses  · No thyromegaly  Resp:  · Normal effort  · Clear to auscultation bilaterally without rhonchi, wheezing or crackles  Cardiovascular:  · On auscultation, normal S1 and S2 without murmurs, rubs or gallops  · No bruits of bilateral carotids and no JVD  Gastrointestinal:  · Nontender, nondistended, and no masses  · No hepatosplenomegaly  Musculoskeletal:  · Normal Gait  · All extremities without clubbing, cyanosis or edema  Skin:  · +blanching, dark red rash across back: Worse in dependent areas when where his ribs would touch a surface on a bed. · Rash is not painful to the touch  · No areas of increased heat or induration on palpation  Psych:  · Normal mood and affect  · Normal insight and judgement    Assessment / Plan:     1. Malignancy ([address]  Unknown primary. No need to follow-up with heme-onc, as patient does not want treatment or diagnosis. He currently is in hospice. Patient and family understand that this is terminal and will lead to his death at some point. No current uncontrolled anxiety or pain. Patient is having significant fatigue. This likely is the cause. 2. Physical deconditioning  Would like to do home physical therapy and Occupational Therapy to finish the rehab he was supposed to receive after his hip fracture surgery. This would improve his quality of life and prevent future falls and accidents. Unfortunately insurance does not cover this, while on hospice. Discussed with family that they can consider stopping hospice and doing palliative care instead, so home PT/OT can be completed. Could always switch back to hospice in the future as his condition worsens. They are going to look into this option. 3. Anemia, unspecified type  Does not appear to be iron deficiency anemia since he has a normal MCV and RDW. We will check the following to ensure:  - Reticulocytes  - Vitamin B12  - Folate  - Iron and TIBC  - Ferritin  - CBC Auto Differential    4. Episodic confusion  Started when he started the prednisone.   For this reason would like to try to wean the prednisone to see if this improves. Family does understand though, that this could be sign of cancer worsening. Would consider checking urine for UTI, but patient has urinary incontinence and cannot urinate on demand anymore. Wears a depends. He has no confusion today or in recent days. Family will let me know if it comes back or worsens. Gave the following weaning schedule for prednisone:  Week 1:  Prednisone 5mg daily  Week 2:  Prednisone 5 mg every other day   Week 3:  Stop. 5. Rash  We were able to call patient's dermatologist, whose office is right above our office. They are going to look at patient today. Family is taking him up there after this appointment. Told him to make sure they agree with stopping the prednisone, and if they decide otherwise, that is okay with me.

## 2020-11-21 LAB
FOLATE: 11.2 NG/ML (ref 4.78–24.2)
VITAMIN B-12: 673 PG/ML (ref 211–911)

## 2020-11-23 ENCOUNTER — TELEPHONE (OUTPATIENT)
Dept: FAMILY MEDICINE CLINIC | Age: 85
End: 2020-11-23

## 2020-11-23 NOTE — TELEPHONE ENCOUNTER
Care giver has started giving pt Vitamin b-12 to help with his memory issues. She did not run this past any of the family members. Wanting to know if this is okay. Please advise.  Please call pt's daughter back at [phone]

## 2020-11-23 NOTE — TELEPHONE ENCOUNTER
Its not going to hurt him, but if he isn't low in b12, he will just pee a lot of it out    Please document call and then close encounter.   thanks

## 2020-11-24 ENCOUNTER — TELEPHONE (OUTPATIENT)
Dept: FAMILY MEDICINE CLINIC | Age: 85
End: 2020-11-24

## 2020-11-24 NOTE — TELEPHONE ENCOUNTER
1)  Pt was taking prednisone, being weened off of it. He is having outbreaks of confusion, long episodes of outrage, anxiety, and confusion. Wanting to know if there is something can Rx to try to calm him down? Hospice nurse stated to family that lesions from cancer could be causing toxicity in blood which may be causing the outbreaks and brain function issues. Please advise.  Pt's daughter is reachable at [phone]      2)   Also called for lab results: notified daughter of lab results: \" Let family know that blood work is not consistent with iron def anemia.  It is more likely secondary to his cancer       Please document call and then close encounter. Akilah Langley \"

## 2020-12-04 ENCOUNTER — TELEPHONE (OUTPATIENT)
Dept: FAMILY MEDICINE CLINIC | Age: 85
End: 2020-12-04

## 2020-12-04 NOTE — TELEPHONE ENCOUNTER
[facility] called requesting pt's recent labs to be faxed to them at [phone]      ATTN: Howie Malagon

## 2020-12-29 ENCOUNTER — TELEPHONE (OUTPATIENT)
Dept: FAMILY MEDICINE CLINIC | Age: 85
End: 2020-12-29

## 2023-07-24 NOTE — PLAN OF CARE
ARU PATIENT TREATMENT PLAN  [facility]   [address]  [phone]    Garima Whitt    : 1927  Acct #: [de-identified]  MRN: [id number]   PHYSICIAN:  Kirk Almendarez MD    Rehabilitation Diagnosis:    Left intertrochanteric femur fracture, femoral neck fracture   -s/p ORIF with cephalomedullary nail ( with Dr. Ashok Cason  -Wound care  -25% WB LLE  -PT/OT     Hepatic lesions, right juxta cardiac adenopathy  -Presumed metastatic disease, unknown primary  -No further work-up per patient wishes, does not plan to pursue treatment  -Palliative care consulted and will follow at home for long-term needs     Acute blood loss anemia   -Post-surgical.   -Monitor Hgb, transfuse prn <7.      Acute hypoxic respiratory failure  -Supplemental O2, wean as tolerated  -IS  -Breathing treatments for COPD as below     Chronic right pleural effusion  -Follows with Pulm as outpatient     COPD  -Symbicort, Spiriva, prn duoneb     HTN-->post-op hypotension     SHREE  -Improved with IVF  -Avoid nephrotoxins, renally dose meds  -Monitor      Hypothyroidism  -Levothyroxine     Bladder   -High risk retention   -Monitor PVRs, SC prn >300cc  -Flomax     Bowel   -High risk constipation   -senna+colace BID, PRN miralax, MoM, and bisacodyl supp.     Pain control  -prn Norco     PPx  -DVT: lovenox while inpatient, then ASA 81 BID x 30 days per Ortho  -GI: pantoprazole       ADMIT DATE:10/4/2020    Patient Goals: Patient wants to go home and to be cared for by family    Admitting Impairments: 25% WB LLE, decreased balance, endurance, safety awareness  Barriers:  25% WB LLE, decreased balance, endurance, safety awareness, medical comorbidities  Participation: patient lives with wife who has memory problems (devin is her care giver but other family members are now helping), he was indpendent with self care and mobility, they have 8 children, 6 live in town.          CARE PLAN Satisfactory NURSING:  Hal Oliva while on this unit will:     [x] Be continent of bowel and bladder     [x] Have an adequate number of bowel movements  [x] Urinate with no urinary retention >300ml in bladder  [] Complete bladder protocol with espinal removal  [x] Maintain O2 SATs at 92%  [x] Have pain managed while on ARU       [] Be pain free by discharge   [x] Have no skin breakdown while on ARU  [] Have improved skin integrity via wound measurements  [x] Have no signs/symptoms of infection at the incision site  [x] Be free from injury during hospitalization   [x] Complete education with patient/family with understanding demonstrated for:  [x] Adjustment   [x] Other: Hip precautions, 25% weight bearing, COPD. Nursing interventions may include bowel/bladder training, education for medical assistive devices, medication education, O2 saturation management, energy conservation, stress management techniques, fall prevention, alarms protocol, seating and positioning, skin/wound care, pressure relief instruction,dressing changes,  infection protection, DVT prophylaxis, and/or assistance with in room safety with transfers to bed, toilet, wheelchair, shower as well as bathroom activities and hygiene.      Patient/caregiver education for:   [x] Disease/sustained injury/management      [x] Medication Use   [x] Surgical intervention   [x] Safety   [x] Body mechanics and or joint protection   [x] Health maintenance         PHYSICAL THERAPY:  Goals:                  Short term goals  Time Frame for Short term goals: 1 week  Short term goal 1: bed mobility with minimal/moderate assistassist  Short term goal 2: transfers at min/mod assist  Short term goal 3: Assess ability to ambulate with wheeled walker 3' with minimal assist, 25% WB L LE  Short term goal 4: Patient propel w/c community distances with SBA  Short term goal 5: bed mobility with contact guard assist            Long term goals  Time Frame for Long term goals : 2 - 3 weeks  Long term goal 1: bed mobility with MI  Long term goal 2: transfers at cont guard assist  Long term goal 3: ambulate with wheeled walker 5' with minimal assist -25% WB L LE  Long term goal 4: Patient propel w/c community distances with MI  Long term goal 5: educate family on steps with w/c versus ramp  These goals were reviewed with this patient at the time of assessment and Kate Long is in agreement. Plan of Care: Pt to be seen 90  mins per day for 5-6 day/week 2-3 weeks. Current Treatment Recommendations: Strengthening, Transfer Training, Endurance Training, Patient/Caregiver Education & Training, ROM, Wheelchair Mobility Training, Pain Management, Equipment Evaluation, Education, & procurement, Balance Training, Gait Training, Home Exercise Program, Functional Mobility Training, Stair training, Safety Education & Training      OCCUPATIONAL THERAPY:  Goals:             Short term goals  Time Frame for Short term goals: 1 week pt will. Narda Santanamer term goal 1: bathe with min assist and AD  Short term goal 2: dress UB after set up, LB with mod assist and AD  Short term goal 3: transfer with min assist and AD, maintainting 25% PWB LLE  Short term goal 4: functional mobility using wheelchair with min assist  Short term goal 5: increase activity tolerance to stand 2 min for ADL  Short term goal 6: toilet with max assist and AD :  Long term goals  Time Frame for Long term goals : 2-3 weeks  Long term goal 1: bathe with supervision/set up  Long term goal 2: dress UB indep, LB with min assist  Long term goal 3: toilet with CGA and AD  Long term goal 4: transfer with CGA/SBA using appropriate AD  Long term goal 5: functional mobility in wheelchair with supervision : These goals were reviewed with this patient at the time of assessment and Kate Long is in agreement    Plan of Care:  Pt to be seen 90   mins per day for 5-6 day/week 2-3 weeks.     Patient Education: 25% PWB      SPEECH THERAPY: Goals will be left blank if speech is not following this patient. Goals:                                                                   CASE MANAGEMENT:  Goals:   Assist patient/family with discharge planning, patient/family counseling,   and coordination with insurance during ARU stay. Admission Period/Goal QIM CODES   QIM  Admit/Goal Score    Eating  4/6   Oral Hygiene  5/6   [address] [place]  1/4   Shower/Bathe Self  3/4   Upper Body Dressing  3/6   Lower Body Dressing  1/4   Putting on/Taking off Footwear  1/3   Roll Left and Right  88/4   Sit to Lying  3/4   Lying to Sitting on Side of Bed  3/4   Sit to Stand  1/4   Chair/Bed to Chair Transfer  1/4   Toilet Transfer  2/4   Car Transfer  88/4   Walk 10 feet  88   Walk 50 feet with 2 Turns  88   Walk 150 feet with 2 turns  88   Walk 10 feet on Uneven Surfaces  88   1 Step  88   4 Steps  88   12 Steps  88   Picking up Object  88   Wheel 50 feet with 2 Turns   Type?  4/6, manual   Wheel 150 feet with 2 Turns   Type?  88/6, manual          Laneta Vega will be seen a minimum of 3 hours of therapy per day, a minimum of 5 out of 7 days per week. [] In this rare instance due to the nature of this patient's medical involvement, this patient will be seen 15 hours per week (900 minutes within a 7 day period). Treatments may include therapeutic exercises, gait training, neuromuscular re-ed, transfer training, community reintegration, bed mobility, w/c mobility and training, self care, home mgmt, cognitive training, energy conservation,dysphagia tx, speech/language/communication therapy, group therapy, and patient/family education. In addition, dietician/nutritionist may monitor calorie count as well as intake and collaboratively work with SLP on dietary upgrades. Neuropsychology/Psychology may evaluate and provide necessary support.     Medical issues being managed closely and that require 24 hour availability of a physician:   [] Swallowing Precautions  [x] Bowel/Bladder Fx  [x] Weight bearing precautions   [x] Wound Care    [x] Pain Mgmt   [x] Infection Protection   [x] DVT Prophylaxis   [x] Fall Precautions  [x] Fluid/Electrolyte/Nutrition Balance   [] Voice Protection   [x] Respiratory  [] Other:    Medical Prognosis: [x] Good  [] Fair    [] Guarded   Total expected IRF days 14-21  Anticipated discharge destination:    [] Home Independently     [x] Home with supervision    []SNF     [] Other                                           Physician anticipated functional outcomes:  Improve mobility, transfers, self care to CGA-supervision with DME as needed to allow safe return home with family support     IPOC brief synthesis: Patient is a 79 yo M with pmh HTN, HLD, COPD, chronic R pleural effusion, and BPH who initially presented 9/29/2020 with left hip pain s/p mechanical fall. Patient reported losing his balance while turning. Found to have left intertrochanteric femur fracture and left femoral neck fracture. Underwent ORIF with cephalomedullary nail (9/30 with Dr. Simi Nash). Now 25% WB on LLE. Course complicated by incidental finding of hepatic lesions, concerning for metastatic disease. Oncology and GI were consulted. Patient decided not to have biopsy done as he does not plan to pursue any treatment. Course further complicate by acute hypoxic respiratory failure and SHREE. Patient now presents to ARU with impaired mobility and self-care below his baseline. He requires comprehensive inpatient rehab program in order to return to community setting. I have reviewed this initial plan of care and agree with its contents:    Title   Name    Date    Time    Physician: Corky Calzada.  Sharonda Vizcarra MD 10/6/2020, 11:11 AM      Case Mgmt:  Kenyetta Mcgraw, [place]     Case Management   [phone]    10/7/2020  3:36 PM      OT:  Fausto Monae OTR/L  #[id number] 10/5/20 4:29 PM      PT:Electronically signed by Paulo Mascorro PT [id number] on 10/5/2020 at 5:30 PM      RN: Janusz Og RN, Mirtha James 10/05/2020 1:48 pm    ST:    : Londa Harada, MPT  10/6/2020  [id number]  Other:

## 2024-02-01 NOTE — TELEPHONE ENCOUNTER
brown   States pt had tried to get refill on inhaler. It cost $300, pt did not have filled due to price. Wanting to know if can get a refill on an inhaler, not one that is so expensive. States when home health nurse has been coming in, pt's O2 has been 90. Please advise.  Wanting to speak with Jean Lopez. Please call pt's daughter, Prakash Batistaer, back at [phone]

## (undated) DEVICE — SUTURE VCRL SZ 2-0 L18IN ABSRB UD CT-1 L36MM 1/2 CIR J839D

## (undated) DEVICE — SUTURE VCRL SZ 1 L18IN ABSRB UD L36MM CT-1 1/2 CIR J841D

## (undated) DEVICE — MAJOR SET UP PK

## (undated) DEVICE — KIT POS FOAM HANA TBL

## (undated) DEVICE — 3M™ COBAN™ NL STERILE NON-LATEX SELF-ADHERENT WRAP, 2084S, 4 IN X 5 YD (10 CM X 4,5 M), 18 ROLLS/CASE: Brand: 3M™ COBAN™

## (undated) DEVICE — GOWN,SIRUS,POLYRNF,BRTHSLV,XL,30/CS: Brand: MEDLINE

## (undated) DEVICE — GUIDEWIRE ORTH L400MM DIA3.2MM FOR TFN

## (undated) DEVICE — CHLORAPREP 26ML ORANGE

## (undated) DEVICE — CANISTER, RIGID, 1200CC: Brand: MEDLINE INDUSTRIES, INC.

## (undated) DEVICE — 6619 2 PTNT ISO SYS INCISE AREA&LT;(&GT;&&LT;)&GT;P: Brand: STERI-DRAPE™ IOBAN™ 2

## (undated) DEVICE — SYRINGE MED 10ML LUERLOCK TIP W/O SFTY DISP

## (undated) DEVICE — ROD RMR L950MM DIA3MM W/ STR BALL TIP

## (undated) DEVICE — NEEDLE HYPO 18GA L1.5IN THN WALL PIVOTING SHLD BVL ORIENTED

## (undated) DEVICE — BIT DRL L330MM DIA4.2MM CALIB 100MM 3 FLUT QUIK CPL

## (undated) DEVICE — GUIDEWIRE ORTH DIA2.8MM 300/150MM CALIB W/ FLUT FOR 6.5MM

## (undated) DEVICE — ELECTRODE PT RET AD L9FT HI MOIST COND ADH HYDRGEL CORDED

## (undated) DEVICE — DRAPE C ARM UNIV W41XL74IN CLR PLAS XR VELC CLSR POLY STRP

## (undated) DEVICE — GLOVE SURG SZ 8 L12IN FNGR THK79MIL GRN LTX FREE

## (undated) DEVICE — GLOVE SURG SZ 75 CRM LTX FREE POLYISOPRENE POLYMER BEAD ANTI

## (undated) DEVICE — PADDING UNDERCAST W4INXL4YD 100% COT CRIMPED FINISH WBRL II

## (undated) DEVICE — SHEET,DRAPE,53X77,STERILE: Brand: MEDLINE

## (undated) DEVICE — MERCY HEALTH WEST TURNOVER: Brand: MEDLINE INDUSTRIES, INC.

## (undated) DEVICE — COVER LT HNDL BLU PLAS

## (undated) DEVICE — SOLUTION IV IRRIG POUR BRL 0.9% SODIUM CHL 2F7124

## (undated) DEVICE — INTENDED FOR TISSUE SEPARATION, AND OTHER PROCEDURES THAT REQUIRE A SHARP SURGICAL BLADE TO PUNCTURE OR CUT.: Brand: BARD-PARKER ® STAINLESS STEEL BLADES

## (undated) DEVICE — STAPLER SKIN H3.9MM WIRE DIA0.58MM CRWN 6.9MM 35 STPL FIX

## (undated) DEVICE — GOWN SIRUS NONREIN LG W/TWL: Brand: MEDLINE INDUSTRIES, INC.

## (undated) DEVICE — NEEDLE HYPO 18GA L1.5IN PNK POLYPR HUB S STL SHT BVL STR

## (undated) DEVICE — PAD DRY FLOOR ABS 32X58IN GRN

## (undated) DEVICE — BIT DRL L300MM DIA5MM CANN QUIK CPL ADJ STP REUSE